# Patient Record
Sex: MALE | Race: BLACK OR AFRICAN AMERICAN | NOT HISPANIC OR LATINO | Employment: OTHER | URBAN - METROPOLITAN AREA
[De-identification: names, ages, dates, MRNs, and addresses within clinical notes are randomized per-mention and may not be internally consistent; named-entity substitution may affect disease eponyms.]

---

## 2018-05-25 ENCOUNTER — TELEPHONE (OUTPATIENT)
Dept: CARDIOLOGY | Facility: MEDICAL CENTER | Age: 63
End: 2018-05-25

## 2018-05-25 DIAGNOSIS — I35.9 AORTIC VALVE DISORDER: ICD-10-CM

## 2018-05-25 NOTE — TELEPHONE ENCOUNTER
"2:10 pm --> Called number on file and a woman had answered the phone.  I introduced myself and where I was calling from and asked if Brannon was available.  She stated that Brannon no longer lives there and [facility] and that if I do get a hold of him, to tell him hi for her since she hasn't seen or heard from patient in a while.  I agreed and ended phone call.  2:15 pm --> Called [facility] and I was told that [facility] and hasn't been for a while.  After I ended [facility] call with the [facility], I called the number on file back and spoke with the lady that is presumably residing there (not his wife).  She stated that patient hasn't been taking his BP meds consistently and there is \"possible dementia/alzhemier's element going on.\"  She stated, \"One of his sons is supposed to be watching out for him, but I don't know which one.\"  She stated, \"He has been JENNIFER for a few days now.\"  She also stated to me that, \"if I mange to get his cell phone to let patient know that people are trying to get a hold of him and to call them.\"  I stated I will do my best and that I will call back in a couple days.  Female residing in residence was thankful for the phone call and call ended.      "

## 2018-05-25 NOTE — PROGRESS NOTES
Message     The referral to Cardiothoracic Surgery will be sent to [facility]. The contact number is [phone].     Noted.

## 2018-05-31 ENCOUNTER — TELEPHONE (OUTPATIENT)
Dept: CARDIOLOGY | Facility: MEDICAL CENTER | Age: 63
End: 2018-05-31

## 2018-05-31 NOTE — TELEPHONE ENCOUNTER
"Received call from . Pt was trying to get his appointments sorted out. He was very frustrated during our conversation repeating several times that he had been on the phone with [facility] for 3 hours and been transferred 6 different times and felt that he was getting \"the run around\". Apologized and validated his frustrations. During call pt was scheduled with Dr. Lambert 6/21 at 3pm for valve surveillance. Pt confirmed appt and wrote down date time and location. Repeated address and instructions on how to get to office several times. At end of call pt appreciated getting something accomplished and stated he had no further questions.   "

## 2018-05-31 NOTE — TELEPHONE ENCOUNTER
----- Message from Florinda Kumar sent at 5/31/2018 10:43 AM PDT -----  Regarding: RE PATIENTS REFERRAL  I spoke to [facility] and they provided me with an additional tel, I added it to the cell portion of his chart. If you wanted to try to reach patient by that tel to get him scheduled. Thanks for your help. :)

## 2018-05-31 NOTE — TELEPHONE ENCOUNTER
L/m on the updated phone number in patient's chart to see about getting scheduled with Dr. Lambert.

## 2018-06-20 DIAGNOSIS — I35.0 NONRHEUMATIC AORTIC VALVE STENOSIS: ICD-10-CM

## 2018-06-20 DIAGNOSIS — R42 LIGHTHEADEDNESS: ICD-10-CM

## 2018-06-20 DIAGNOSIS — R06.02 SHORTNESS OF BREATH: ICD-10-CM

## 2018-06-25 ENCOUNTER — TELEPHONE (OUTPATIENT)
Dept: CARDIOLOGY | Facility: MEDICAL CENTER | Age: 63
End: 2018-06-25

## 2018-06-25 NOTE — TELEPHONE ENCOUNTER
----- Message from VERITO Bishop sent at 2018  3:58 PM PDT -----  Regarding: VALVE NEW  VALVE NEW    Name: Brannon Silver  : [age] y/o M  MR: [id number]  Referring MD: [facility]  Inpatient/Outpatient Referral: outpatient  Referral to Valve Program done: yes  Referral to [facility] done: yes  Echo Done: yes  Echo Reading: EF 25%, Vmax 3.73 m/s, mean 58 mmHg (on angiogram)  Symptoms of AS: DOWD, fatigue  [facility], [facility]  Angiogram: done   Need to schedule angiogram: no  Pertinent Hx: CAD (RCA 90% non-dominant-Ichino states to treat medically), HTN, HLD, non-compliance, mild cognitive impairment with craniotomy in , prior smoker, still smokes marijuana  Allergy to metal or contrast: no  Cr level (within 30 days): no  Oral/IV hydration needed prior to CTA/cath: unknown-need repeat CMP  Social Concerns: mild cognitive impairment-will need good family/friend support to justify good candidate for procedure  Tentative Plan: TAVR v. SAVR  STS: 1.4%, CCI of 2, 90% 10 year survival rate    PATIENT CLASS: TAVR v SAVR  [facility] CONSULT WARRANTED AT THIS TIME: yes, please schedule patient on SLOT A on 7/3 and

## 2018-06-25 NOTE — TELEPHONE ENCOUNTER
Unable to contact patient to schedule for 7/5.  Pt is currently scheduled for 7/5.  I also tried contacting his mother, who is listed on file, and I couldn't get through that number.

## 2018-06-25 NOTE — TELEPHONE ENCOUNTER
Thank you. Please let us know if not able to get a hold of him the week before and we will cancel his apt's. SC

## 2018-06-26 ENCOUNTER — TELEPHONE (OUTPATIENT)
Dept: CARDIOLOGY | Facility: MEDICAL CENTER | Age: 63
End: 2018-06-26

## 2018-06-28 ENCOUNTER — TELEPHONE (OUTPATIENT)
Dept: CARDIOLOGY | Facility: MEDICAL CENTER | Age: 63
End: 2018-06-28

## 2018-06-29 ENCOUNTER — HOSPITAL ENCOUNTER (OUTPATIENT)
Facility: MEDICAL CENTER | Age: 63
End: 2018-06-29
Attending: INTERNAL MEDICINE | Admitting: INTERNAL MEDICINE
Payer: COMMERCIAL

## 2018-07-05 ENCOUNTER — APPOINTMENT (OUTPATIENT)
Dept: RADIOLOGY | Facility: MEDICAL CENTER | Age: 63
End: 2018-07-05
Attending: INTERNAL MEDICINE
Payer: COMMERCIAL

## 2018-07-05 ENCOUNTER — APPOINTMENT (OUTPATIENT)
Dept: CARDIOLOGY | Facility: MEDICAL CENTER | Age: 63
End: 2018-07-05
Attending: INTERNAL MEDICINE
Payer: COMMERCIAL

## 2018-07-05 ENCOUNTER — HOSPITAL ENCOUNTER (OUTPATIENT)
Dept: RADIOLOGY | Facility: MEDICAL CENTER | Age: 63
End: 2018-07-05
Attending: INTERNAL MEDICINE | Admitting: INTERNAL MEDICINE
Payer: COMMERCIAL

## 2018-07-05 DIAGNOSIS — R42 LIGHTHEADEDNESS: ICD-10-CM

## 2018-07-05 DIAGNOSIS — R06.02 SHORTNESS OF BREATH: ICD-10-CM

## 2018-07-05 DIAGNOSIS — I35.0 NONRHEUMATIC AORTIC VALVE STENOSIS: ICD-10-CM

## 2018-07-05 LAB
ABO GROUP BLD: NORMAL
ALBUMIN SERPL BCP-MCNC: 4.2 G/DL (ref 3.2–4.9)
ALBUMIN/GLOB SERPL: 1.3 G/DL
ALP SERPL-CCNC: 108 U/L (ref 30–99)
ALT SERPL-CCNC: 5 U/L (ref 2–50)
ANION GAP SERPL CALC-SCNC: 10 MMOL/L (ref 0–11.9)
APPEARANCE UR: CLEAR
AST SERPL-CCNC: 12 U/L (ref 12–45)
BACTERIA #/AREA URNS HPF: NEGATIVE /HPF
BILIRUB SERPL-MCNC: 0.6 MG/DL (ref 0.1–1.5)
BILIRUB UR QL STRIP.AUTO: NEGATIVE
BLD GP AB SCN SERPL QL: NORMAL
BNP SERPL-MCNC: 423 PG/ML (ref 0–100)
BUN SERPL-MCNC: 28 MG/DL (ref 8–22)
CALCIUM SERPL-MCNC: 9.8 MG/DL (ref 8.5–10.5)
CHLORIDE SERPL-SCNC: 111 MMOL/L (ref 96–112)
CO2 SERPL-SCNC: 16 MMOL/L (ref 20–33)
COLOR UR: YELLOW
CREAT SERPL-MCNC: 1.59 MG/DL (ref 0.5–1.4)
EPI CELLS #/AREA URNS HPF: NEGATIVE /HPF
ERYTHROCYTE [DISTWIDTH] IN BLOOD BY AUTOMATED COUNT: 45.7 FL (ref 35.9–50)
GLOBULIN SER CALC-MCNC: 3.2 G/DL (ref 1.9–3.5)
GLUCOSE SERPL-MCNC: 187 MG/DL (ref 65–99)
GLUCOSE UR STRIP.AUTO-MCNC: NEGATIVE MG/DL
HCT VFR BLD AUTO: 44.8 % (ref 42–52)
HGB BLD-MCNC: 15.1 G/DL (ref 14–18)
HYALINE CASTS #/AREA URNS LPF: ABNORMAL /LPF
INR PPP: 0.99 (ref 0.87–1.13)
KETONES UR STRIP.AUTO-MCNC: NEGATIVE MG/DL
LEUKOCYTE ESTERASE UR QL STRIP.AUTO: NEGATIVE
MCH RBC QN AUTO: 26.7 PG (ref 27–33)
MCHC RBC AUTO-ENTMCNC: 33.7 G/DL (ref 33.7–35.3)
MCV RBC AUTO: 79.2 FL (ref 81.4–97.8)
MICRO URNS: ABNORMAL
MORPHOLOGY BLD-IMP: NORMAL
NITRITE UR QL STRIP.AUTO: NEGATIVE
PH UR STRIP.AUTO: 5 [PH]
PLATELET # BLD AUTO: 137 K/UL (ref 164–446)
PLATELET BLD QL SMEAR: NORMAL
PMV BLD AUTO: 12.2 FL (ref 9–12.9)
POTASSIUM SERPL-SCNC: 3.7 MMOL/L (ref 3.6–5.5)
PROT SERPL-MCNC: 7.4 G/DL (ref 6–8.2)
PROT UR QL STRIP: 100 MG/DL
PROTHROMBIN TIME: 12.8 SEC (ref 12–14.6)
RBC # BLD AUTO: 5.66 M/UL (ref 4.7–6.1)
RBC # URNS HPF: ABNORMAL /HPF
RBC BLD AUTO: NORMAL
RBC UR QL AUTO: NEGATIVE
RH BLD: NORMAL
SODIUM SERPL-SCNC: 137 MMOL/L (ref 135–145)
SP GR UR STRIP.AUTO: 1.02
UROBILINOGEN UR STRIP.AUTO-MCNC: 0.2 MG/DL
WBC # BLD AUTO: 5.5 K/UL (ref 4.8–10.8)
WBC #/AREA URNS HPF: ABNORMAL /HPF

## 2018-07-05 PROCEDURE — 71275 CT ANGIOGRAPHY CHEST: CPT

## 2018-07-05 PROCEDURE — 71048 X-RAY EXAM CHEST 4+ VIEWS: CPT

## 2018-07-05 PROCEDURE — 36415 COLL VENOUS BLD VENIPUNCTURE: CPT

## 2018-07-05 PROCEDURE — 80053 COMPREHEN METABOLIC PANEL: CPT

## 2018-07-05 PROCEDURE — 86901 BLOOD TYPING SEROLOGIC RH(D): CPT

## 2018-07-05 PROCEDURE — 83880 ASSAY OF NATRIURETIC PEPTIDE: CPT

## 2018-07-05 PROCEDURE — 85610 PROTHROMBIN TIME: CPT

## 2018-07-05 PROCEDURE — 94010 BREATHING CAPACITY TEST: CPT

## 2018-07-05 PROCEDURE — 85027 COMPLETE CBC AUTOMATED: CPT

## 2018-07-05 PROCEDURE — 86850 RBC ANTIBODY SCREEN: CPT

## 2018-07-05 PROCEDURE — 700117 HCHG RX CONTRAST REV CODE 255: Performed by: INTERNAL MEDICINE

## 2018-07-05 PROCEDURE — 74174 CTA ABD&PLVS W/CONTRAST: CPT

## 2018-07-05 PROCEDURE — 81001 URINALYSIS AUTO W/SCOPE: CPT

## 2018-07-05 PROCEDURE — 93880 EXTRACRANIAL BILAT STUDY: CPT

## 2018-07-05 PROCEDURE — 86900 BLOOD TYPING SEROLOGIC ABO: CPT

## 2018-07-05 RX ADMIN — IOHEXOL 100 ML: 350 INJECTION, SOLUTION INTRAVENOUS at 13:45

## 2018-07-05 NOTE — RESPIRATORY CARE
"Pulmonary Function Test Results (PFT)    Spirometry Actual Predicted   FVC (L) PEF: 2.73L/s (07/05/18 0943) Predicted PEF: 9.22L/s (07/05/18 0943)   FEV1 ((L) FEV1: 1.99l (07/05/18 0943) Predicted FEV1: 3.64L (07/05/18 0943)   FEV1/FVC (%) VC: 3.31L (07/05/18 0943) Predicted VC: 4.84L (07/05/18 0943)   FEF 25-75% (L/sec)         Test performed in accordance with ATS/ERS standards. Predictive values from NHANES III and graded \"A\". Patient performed test with poor effort and at least 3 reproducible tests. Test printed and copy left in chart.    "

## 2018-07-09 ENCOUNTER — TELEPHONE (OUTPATIENT)
Dept: CARDIOLOGY | Facility: MEDICAL CENTER | Age: 63
End: 2018-07-09

## 2018-07-09 NOTE — TELEPHONE ENCOUNTER
"Spoke with patient who is frustrated that he missed his consultation with Dr. Lambert 7/5/18. He states that he was on campus all day doing test and does not understand why Dr. Lambert left at \"11:35am\" when the patient was here and able to see Dr. Lambert. Explained that patient's original appointment time was 0740, and that time was rescheduled to 1020 per patient request. Reminded patient that he did not show for his 0740 or his 1020 appointment as scheduled 7/5/18. Explained that our next available date for rescheduling such appointment is 7/26/18 at 1040 (arriving at 1020) [address] (4th floor). Repeated this detailed information multiple times, also described that our office is on the corner of [address] and [address] directly across the street from [employer]. Encouraged patient to write down such information. After repeating back to me many times the time/date/location of such appointment, the patient agrees to attend scheduled TAVR consult and states no further questions at this time.   "

## 2018-07-11 ENCOUNTER — TELEPHONE (OUTPATIENT)
Dept: CARDIOLOGY | Facility: MEDICAL CENTER | Age: 63
End: 2018-07-11

## 2018-07-11 NOTE — TELEPHONE ENCOUNTER
----- Message from VERITO Bishop sent at 7/11/2018  3:11 PM PDT -----  No need to call patient. He is a valve patient. SC

## 2018-07-12 ENCOUNTER — TELEPHONE (OUTPATIENT)
Dept: CARDIOLOGY | Facility: MEDICAL CENTER | Age: 63
End: 2018-07-12

## 2018-07-12 NOTE — TELEPHONE ENCOUNTER
CT-CTA CHEST WITH & W/O-POST PROCESS   Order: [id number]   Status:  Final result   Visible to patient:  No (Not Released) Dx:  Shortness of breath; Lightheadedness;...   Notes recorded by VERITO Bishop on 7/9/2018 at 11:02 AM PDT  Nadeem CTA.    Annulus 642.    L iliac smaller, R larger but should be adequate.    Coronary heights good.    AAA noted at 6.2 X 6.1 cm-discussed with JI, can't fix before, will be done post-tavr    Forward to PCP-small kidneys, small umbilical hernia, bilateral renal cysts, pulmonary nodule and LLL scarring.    SC     [facility] and [facility]. Results faxed to the [facility] at [phone].     Pt is scheduled to see Dr. Lambert 7/26 and can discuss results with the doctor then.

## 2018-07-26 ENCOUNTER — OFFICE VISIT (OUTPATIENT)
Dept: CARDIOLOGY | Facility: MEDICAL CENTER | Age: 63
End: 2018-07-26
Payer: COMMERCIAL

## 2018-07-26 ENCOUNTER — TELEPHONE (OUTPATIENT)
Dept: CARDIOLOGY | Facility: MEDICAL CENTER | Age: 63
End: 2018-07-26

## 2018-07-26 VITALS
WEIGHT: 250 LBS | OXYGEN SATURATION: 94 % | HEART RATE: 65 BPM | DIASTOLIC BLOOD PRESSURE: 80 MMHG | BODY MASS INDEX: 35 KG/M2 | SYSTOLIC BLOOD PRESSURE: 120 MMHG | HEIGHT: 71 IN

## 2018-07-26 DIAGNOSIS — I42.8 OTHER CARDIOMYOPATHY (HCC): ICD-10-CM

## 2018-07-26 DIAGNOSIS — I71.40 ABDOMINAL AORTIC ANEURYSM (AAA) WITHOUT RUPTURE (HCC): ICD-10-CM

## 2018-07-26 DIAGNOSIS — Z95.5 STENTED CORONARY ARTERY: ICD-10-CM

## 2018-07-26 DIAGNOSIS — I35.0 SEVERE AORTIC STENOSIS: ICD-10-CM

## 2018-07-26 DIAGNOSIS — I63.89 CEREBROVASCULAR ACCIDENT (CVA) DUE TO OTHER MECHANISM (HCC): ICD-10-CM

## 2018-07-26 DIAGNOSIS — I25.10 CORONARY ARTERY DISEASE INVOLVING NATIVE CORONARY ARTERY OF NATIVE HEART WITHOUT ANGINA PECTORIS: ICD-10-CM

## 2018-07-26 PROBLEM — I63.9 CVA (CEREBRAL VASCULAR ACCIDENT) (HCC): Status: ACTIVE | Noted: 2018-07-26

## 2018-07-26 PROBLEM — I42.9 CARDIOMYOPATHY (HCC): Status: ACTIVE | Noted: 2018-07-26

## 2018-07-26 LAB — EKG IMPRESSION: NORMAL

## 2018-07-26 PROCEDURE — 99205 OFFICE O/P NEW HI 60 MIN: CPT | Performed by: INTERNAL MEDICINE

## 2018-07-26 PROCEDURE — 93000 ELECTROCARDIOGRAM COMPLETE: CPT | Performed by: INTERNAL MEDICINE

## 2018-07-26 ASSESSMENT — ENCOUNTER SYMPTOMS
PND: 0
BLURRED VISION: 0
FEVER: 0
ORTHOPNEA: 0
DIZZINESS: 0
ABDOMINAL PAIN: 0
LOSS OF CONSCIOUSNESS: 0
CHILLS: 0
INSOMNIA: 0
SHORTNESS OF BREATH: 0
MYALGIAS: 0
WEAKNESS: 1
PALPITATIONS: 0

## 2018-07-26 NOTE — PROGRESS NOTES
No chief complaint on file.      Subjective:   Brannon Silver is a 63 y.o. male who presents today for interventional consult/TAVR evaluation requested by [facility] and [facility] for severe symptomatic aortic stenosis.    Thank you for allowing me to evaluate Mr. Silver, who as you know is a 63 year old male with complex medical history including severe aortic stenosis, cardiomyopathy, coronary artery disease status post stent placement, abdominal aortic aneurysm, possible cerebrovascular accident, history of brain tumor with craniotomy. He was well until two months ago ago when he began to experience moderate fatigue. He has difficulties performing his ADLs dud to this symptom. He denies shortness of breath, dyspnea on exertion, chest pain, dizziness or syncope.    Past Medical History:   Diagnosis Date   • History of brain cancer    • Hypertension    • Valvular heart disease      Past Surgical History:   Procedure Laterality Date   • CRANIOTOMY       Family History   Problem Relation Age of Onset   • Heart Disease Neg Hx      Social History     Social History   • Marital status: Single     Spouse name: N/A   • Number of children: N/A   • Years of education: N/A     Occupational History   • Not on file.     Social History Main Topics   • Smoking status: Former Smoker     Quit date: 3/26/2008   • Smokeless tobacco: Never Used   • Alcohol use Not on file   • Drug use: Unknown   • Sexual activity: Not on file     Other Topics Concern   • Not on file     Social History Narrative   • No narrative on file     Allergies   Allergen Reactions   • Pcn [Penicillins]      Medications reviewed.    Outpatient Encounter Prescriptions as of 7/26/2018   Medication Sig Dispense Refill   • aspirin  MG TBEC Take 1 Tab by mouth every day. 30 Tab 0   • atorvastatin (LIPITOR) 40 MG TABS Take 1 Tab by mouth every bedtime. 30 Each 0   • clopidogrel (PLAVIX) 75 MG TABS Take 2 Tabs by mouth every day. 30 Each 0   • lisinopril  "(PRINIVIL) 20 MG TABS Take 1 Tab by mouth every day. 30 Each 0   • metoprolol (LOPRESSOR) 100 MG TABS Take 1 Tab by mouth 2 Times a Day. 30 Each 0     No facility-administered encounter medications on file as of 7/26/2018.      Review of Systems   Constitutional: Positive for malaise/fatigue. Negative for chills and fever.   HENT: Negative for congestion.    Eyes: Negative for blurred vision.   Respiratory: Negative for shortness of breath.    Cardiovascular: Negative for chest pain, palpitations, orthopnea, leg swelling and PND.   Gastrointestinal: Negative for abdominal pain.   Genitourinary: Negative for dysuria.   Musculoskeletal: Positive for joint pain. Negative for myalgias.   Skin: Negative for rash.   Neurological: Positive for weakness. Negative for dizziness and loss of consciousness.   Psychiatric/Behavioral: The patient does not have insomnia.         Objective:   /80   Pulse 65   Ht 1.803 m (5' 11\")   Wt 113.4 kg (250 lb)   SpO2 94%   BMI 34.87 kg/m²     Physical Exam   Constitutional: He is oriented to person, place, and time. He appears well-developed and well-nourished.   HENT:   Head: Normocephalic and atraumatic.   Eyes: Pupils are equal, round, and reactive to light. Conjunctivae are normal.   Neck: Normal range of motion. Neck supple.   Cardiovascular: Normal rate and regular rhythm.    Murmur heard.  Pulmonary/Chest: Effort normal and breath sounds normal.   Abdominal: Soft. Bowel sounds are normal.   Musculoskeletal: Normal range of motion.   Neurological: He is alert and oriented to person, place, and time.   Skin: Skin is warm and dry.   Psychiatric: He has a normal mood and affect.     CARDIAC STUDIES/PROCEDURES:    CARDIAC CATHETERIZATION CONCLUSIONS by Dr. Fuentes (07/16/11)  1.  Acute inferior wall myocardial infarction secondary to 99% stenosis in the mid portion of the right coronary artery.  2.  Status post successful stenting of the right coronary artery with 2.5 x 18 " mm bare metal Vision stent.  3.  No significant disease in the left main and left anterior descending artery with about 50% stenosis in distal left circumflex artery.  4.  Moderate aortic stenosis by echocardiography.  5.  Renal insufficiency.  6.  Obesity.  7.  History of benign tumor of the frontal lobe.    CAROTID ULTRASOUND (07/05/18)  No significant stenosis of the right or left carotid, subclavian, or vertebral arteries.    CT OF CHEST AND ABDOMEN (07/05/18)  1.  Mean aortic annular diameter of 642 sq mm  2.  Minimal diameter right external iliac artery 8.7 mm in minimal diameter left external iliac artery 5.9 mm  3.  Mild bilateral common and external iliac artery tortuosit  4.  Coronary arteries originate greater than 1 cm from the aortic annulus  5.  Importantly, 6.2 x 6.1 cm infrarenal abdominal aortic aneurysm  6.  Left ventricular hypertrophy  7.  Bilateral small kidney with cortical thinning compatible sequela of a chronic process  8.  Small umbilical hernia containing omental fat but no bowel  9.  Bilateral simple renal cyst  10.  7 mm subsolid right upper lobe pulmonary nodule  11.  Left lower lobe scarring    ECHOCARDIOGRAM CONCLUSIONS [facility] (07/2017)  Echocardiogram showing reduced left ventricular systolic function, ejection fraction of 25-30 %, severe aortic stenosis with calculated PETER of 0.66 cm2, Peak gradient of 68 mmHg, mean gradient of 52 mmHg.    EKG was ordered for severe aortic stenosis, performed on (07/26/18) and reviewed: EKG shows sinus rhythm with non-specific intra-ventricular conduction delay.    Laboratory results of 07/05/18 were reviewed. Bun of 28 mg/dl, creatinine levels of 1.56 mg/dl noted.    Assessment:     1. Severe aortic stenosis     2. Other cardiomyopathy (HCC)     3. Coronary artery disease involving native coronary artery of native heart without angina pectoris     4. Stented coronary artery     5. Abdominal aortic aneurysm (AAA) without rupture (HCC)     6.  Cerebrovascular accident (CVA) due to other mechanism (HCC)       Medical Decision Making:  Today's Assessment / Status / Plan:     1. Aortic stenosis: He is symptomatic with his severe aortic stenosis, NYHA class III. He has been determined to be intermediate risk per Dr. Orona with STS score of 3-5%. We will schedule him for cardiac catheterization followed by TAVR. He understands the risks and benefits and agrees with plan.  2. Cardiomyopathy  3. Coronary artery disease with stent placement: Plan as above.  4. Abdominal aortic aneurysm: He will need repair of his aneurysm following TAVR.  5. Possible history of stroke (2009): He remains clinically stable without recurrence of stroke symptoms.  6. Health maintenance: History of brain tumor with craniotomy [facility] (1975)    The risks, benefits, and alternatives to coronary angiography with IV sedation were discussed in great detail. Specific risks mentioned include bleeding, infection, kidney damage, allergic reaction, cardiac perforation with possible tamponade requiring jamie-cardiocentesis or possible open heart surgery. In addition, we discussed that 10% of patients will experience small to moderate bruising at the side of the arterial puncture. Lastly the risks of heart attack, stroke, and death were discussed; the risks of major complications such as heart attack or stroke caused by the angiogram is less than 1%; the risk of death is approximately 1 in 1000. The patient verbalized understanding of these potential complications and wishes to proceed with this procedure.    The risks, benefits, and alternatives to TAVR, general anesthesia and transesophageal echocardiogram were discussed in great detail. Specific risks mentioned include bleeding, infection, kidney damage, allergic reaction, cardiac perforation with possible tamponade requiring jamie-cardiocentesis or possible open heart surgery if the patient is a candidate. Lastly the risks of heart attack,  stroke, and death were discussed; the risks of major complications includingall cause mortality of 2.2%, disabling stroke of 1.1%, the risk on new pacemaker of 12% and the risk of vascular complications of 4.1%. The patient verbalized understanding of these potential complications and wishes to proceed with this procedure. (Source 3 Registry).  The procedure will be performed completely in collaboration with cardiac surgery.    We will follow up in two months.    Thank you for this consult.

## 2018-07-26 NOTE — LETTER
[facility]   [address]  [place] [address]  Phone: [phone]  Fax: [phone]              Brannon Silver  1955    Encounter Date: 7/26/2018    Boston Lambert M.D.          PROGRESS NOTE:  No chief complaint on file.      Subjective:   Brannon Silver is a 63 y.o. male who presents today for interventional consult/TAVR evaluation requested by [facility] for severe symptomatic aortic stenosis.    Thank you for allowing me to evaluate Mr. Silver, who as you know is a 63 year old male with complex medical history including severe aortic stenosis, cardiomyopathy, coronary artery disease status post stent placement, abdominal aortic aneurysm, possible cerebrovascular accident, history of brain tumor with craniotomy.    Past Medical History:   Diagnosis Date   • History of brain cancer    • Hypertension    • Valvular heart disease      Past Surgical History:   Procedure Laterality Date   • CRANIOTOMY       Family History   Problem Relation Age of Onset   • Heart Disease Neg Hx      Social History     Social History   • Marital status: Single     Spouse name: N/A   • Number of children: N/A   • Years of education: N/A     Occupational History   • Not on file.     Social History Main Topics   • Smoking status: Former Smoker     Quit date: 3/26/2008   • Smokeless tobacco: Never Used   • Alcohol use Not on file   • Drug use: Unknown   • Sexual activity: Not on file     Other Topics Concern   • Not on file     Social History Narrative   • No narrative on file     Allergies   Allergen Reactions   • Pcn [Penicillins]      Medications reviewed.    Outpatient Encounter Prescriptions as of 7/26/2018   Medication Sig Dispense Refill   • aspirin  MG TBEC Take 1 Tab by mouth every day. 30 Tab 0   • atorvastatin (LIPITOR) 40 MG TABS Take 1 Tab by mouth every bedtime. 30 Each 0   • clopidogrel (PLAVIX) 75 MG TABS Take 2 Tabs by mouth every day. 30 Each 0   •  "lisinopril (PRINIVIL) 20 MG TABS Take 1 Tab by mouth every day. 30 Each 0   • metoprolol (LOPRESSOR) 100 MG TABS Take 1 Tab by mouth 2 Times a Day. 30 Each 0     No facility-administered encounter medications on file as of 7/26/2018.      Review of Systems   Constitutional: Negative for chills and fever.   HENT: Negative for congestion.    Eyes: Negative for blurred vision.   Respiratory: Negative for shortness of breath.    Cardiovascular: Negative for chest pain, palpitations, orthopnea, leg swelling and PND.   Gastrointestinal: Negative for abdominal pain.   Genitourinary: Negative for dysuria.   Musculoskeletal: Negative for joint pain and myalgias.   Skin: Negative for rash.   Neurological: Negative for dizziness and loss of consciousness.   Psychiatric/Behavioral: The patient does not have insomnia.         Objective:   /80   Pulse 65   Ht 1.803 m (5' 11\")   Wt 113.4 kg (250 lb)   SpO2 94%   BMI 34.87 kg/m²      Physical Exam   Constitutional: He is oriented to person, place, and time. He appears well-developed and well-nourished.   HENT:   Head: Normocephalic and atraumatic.   Eyes: Pupils are equal, round, and reactive to light. Conjunctivae are normal.   Neck: Normal range of motion. Neck supple.   Cardiovascular: Normal rate and regular rhythm.    Murmur heard.  Pulmonary/Chest: Effort normal and breath sounds normal.   Abdominal: Soft. Bowel sounds are normal.   Musculoskeletal: Normal range of motion.   Neurological: He is alert and oriented to person, place, and time.   Skin: Skin is warm and dry.   Psychiatric: He has a normal mood and affect.     CARDIAC STUDIES/PROCEDURES:    CARDIAC CATHETERIZATION CONCLUSIONS by Dr. Fuentes (07/16/11)  1.  Acute inferior wall myocardial infarction secondary to 99% stenosis  in the mid portion of the right coronary artery.  2.  Status post successful stenting of the right coronary artery with  2.5 x 18 mm bare metal Vision stent.  3.  No significant " disease in the left main and left anterior descending  artery with about 50% stenosis in distal left circumflex artery.  4.  Moderate aortic stenosis by echocardiography.  5.  Renal insufficiency.  6.  Obesity.  7.  History of benign tumor of the frontal lobe.    CAROTID ULTRASOUND (07/05/18)  No significant stenosis of the right or left carotid, subclavian, or vertebral arteries.    CT OF CHEST AND ABDOMEN (07/05/18)  1.  Mean aortic annular diameter of 642 sq mm  2.  Minimal diameter right external iliac artery 8.7 mm in minimal diameter left external iliac artery 5.9 mm  3.  Mild bilateral common and external iliac artery tortuosit  4.  Coronary arteries originate greater than 1 cm from the aortic annulus  5.  Importantly, 6.2 x 6.1 cm infrarenal abdominal aortic aneurysm  6.  Left ventricular hypertrophy  7.  Bilateral small kidney with cortical thinning compatible sequela of a chronic process  8.  Small umbilical hernia containing omental fat but no bowel  9.  Bilateral simple renal cyst  10.  7 mm subsolid right upper lobe pulmonary nodule  11.  Left lower lobe scarring    ECHOCARDIOGRAM CONCLUSIONS [facility] (07/2017)  Echocardiogram showing reduced left ventricular systolic function, ejection fraction of 25-30 %, severe aortic stenosis with calculated PETER of 0.66 cm2, Peak gradient of 68 mmHg, mean gradient of 52 mmHg.    EKG was ordered for severe aortic stenosis, performed on (07/26/18) and reviewed: EKG shows sinus rhythm.    Laboratory results of 07/05/18 were reviewed. Bun of 28 mg/dl, creatinine levels of 1.56 mg/dl noted.    Assessment:     1. Severe aortic stenosis     2. Other cardiomyopathy (HCC)     3. Coronary artery disease involving native coronary artery of native heart without angina pectoris     4. Stented coronary artery     5. Abdominal aortic aneurysm (AAA) without rupture (HCC)     6. Cerebrovascular accident (CVA) due to other mechanism (HCC)         Medical Decision Making:  Today's  Assessment / Status / Plan:     1. Aortic stenosis: He is symptomatic with his severe aortic stenosis, NYHA class III. He has been determined to be intermediate risk per Dr. Orona with STS score of 3-5%. We will schedule him for cardiac catheterization followed by TAVR. He understands the risks and benefits and agrees with plan.  2. Cardiomyopathy  3. Coronary artery disease with stent placement: Plan as above.  4. Abdominal aortic aneurysm: He will need repair of his aneurysm following TAVR.  5. History of stroke: He remains clinically stable without recurrence of stroke symptoms.  6. Health maintenance: History of brain tumor with craniotomy    The risks, benefits, and alternatives to coronary angiography with IV sedation were discussed in great detail. Specific risks mentioned include bleeding, infection, kidney damage, allergic reaction, cardiac perforation with possible tamponade requiring jamie-cardiocentesis or possible open heart surgery. In addition, we discussed that 10% of patients will experience small to moderate bruising at the side of the arterial puncture. Lastly the risks of heart attack, stroke, and death were discussed; the risks of major complications such as heart attack or stroke caused by the angiogram is less than 1%; the risk of death is approximately 1 in 1000. The patient verbalized understanding of these potential complications and wishes to proceed with this procedure.    The risks, benefits, and alternatives to TAVR, general anesthesia and transesophageal echocardiogram were discussed in great detail. Specific risks mentioned include bleeding, infection, kidney damage, allergic reaction, cardiac perforation with possible tamponade requiring jamie-cardiocentesis or possible open heart surgery if the patient is a candidate. Lastly the risks of heart attack, stroke, and death were discussed; the risks of major complications includingall cause mortality of 2.2%, disabling stroke of 1.1%,  the risk on new pacemaker of 12% and the risk of vascular complications of 4.1%. The patient verbalized understanding of these potential complications and wishes to proceed with this procedure. (Source 3 Registry).  The procedure will be performed completely in collaboration with cardiac surgery.    We will follow up in two months.    Thank you for this consult.           No Recipients

## 2018-07-26 NOTE — TELEPHONE ENCOUNTER
TAVR functional assessment     Mental Status Assessment:  Appearance: normal  Behavior: unusual, growling/moaning at times  Mood/Affect: blunt  Thought process/content: slow  Cognition: slow  Functional ability: normal  Dental Concerns: full upper and lower dentures  Dental Clearance warranted: no  Further mental assessment needed: patient deemed competent by psych and neuro     Post-op Plan of Care:  Support systems: Mark (neighbor)   Patient understands discharge plan: yes  DME: none  Home health warranted prior to TAVR: no  Social concerns for discharge: yes, transport from hosptal to home is unknown.  PCP alerted of social concerns: n/a  POLST: completed today, scanned into chart  Advance directive: will complete prior to surgery.  Flu/PNA vaccines completed: completed     Goal is to be able to continue living indpendently and caring for himself.      Concerns prior to TAVR: he is concerned about the care of his emotional support cat while inpatient. There is also concern about transportation and his ability to arrive at 0530 for procedure.     Met with patient during New TAVR consult. Asked if patient has any questions regarding the information Dr. Lambert has discussed. Patient denies further questions regarding information delivered at this time.     Explained that patient will require SBE prophylactic antibiotic prior to any procedures or surgery, including dental post TAVR. Patient states understanding and agrees to carry SBE antibiotic wallet card at all times.    Reviewed patient TAVR education packet explaining that information is provided regarding preparation for TAVR the night prior, what to expect during the hospital stay, average LOS, and what to look out for post TAVR discharge. Encouraged pt to take this packet home, read through, and contact me with any questions or concerns they may have. My goal is to assure that before we ever schedule procedure that patient and family have clear understanding  of all entailed and agree with plan.     Completed KCCQ12 and frailty assessment.Provided patient education regarding use of CHG wipes Sunday night prior to procedure, stressing the importance not rinsing or drying, allowing skin to air dry naturally. Explained that patient should not eat or drink anything past midnight Sunday. Encouraged patient to wear something clean and comfortable, easy to get on/off to check in Monday morning at 0530. Patient may have friends and family in the pre-operational area until patient is taken to the operating room, at which time family and friends will be asked to wait on floor 1 [facility] surgical waiting area. On completion of TAVR heart team will update family. Once update is given there is some time before family/friends may visit patient in their assigned room. Length of stay on average is one night, however patient may stay a couple days depending on specific needs at that time. Patient given printed instructions sheet with all above stated instructions.Patient states understanding of all material and education presented today and has no further questions at this time. Encouraged patient again, to contact me with any questions or concerns during this work-up process. Patient states understanding and thankful. Patient was then escorted by myself to clinic appointment check-out desk.

## 2018-07-31 ENCOUNTER — TELEPHONE (OUTPATIENT)
Dept: CARDIOLOGY | Facility: MEDICAL CENTER | Age: 63
End: 2018-07-31

## 2018-07-31 NOTE — TELEPHONE ENCOUNTER
----- Message from Ashleigh Corbin R.N. sent at 7/30/2018  9:42 AM PDT -----  Regarding: TAVR cath JANET Mckeon,    It looks like Dr. Lambert spoke with this patient 7/26 about being scheduled for a PRE TAVR cath. The patient will need scheduled no later than 8/3 if possible. Let me know if you have any questions. Thanks.    Ashleigh

## 2018-07-31 NOTE — TELEPHONE ENCOUNTER
----- Message from Ashleigh Corbin R.N. sent at 7/31/2018  1:45 PM PDT -----  Regarding: pre TAVR cath JANET Mckeon,    This patient called wanting to know when he may be scheduled for cath. I let him know that a  will contact him for such. Just wanted to update you that he is awaiting call :)    Ashleigh

## 2018-08-01 ENCOUNTER — TELEPHONE (OUTPATIENT)
Dept: CARDIOLOGY | Facility: MEDICAL CENTER | Age: 63
End: 2018-08-01

## 2018-08-01 NOTE — TELEPHONE ENCOUNTER
Received call from [facility] MAMIE Vang confirming receipt of message regarding patient's AAA. She has placed referral for patient to follow up with [facility] vascular surgeon. She reports patient has seen [facility] vascular surgeon prior and will be following up for treatment. She will keep us apprised of any future plan of care and very thankful for notifications today.

## 2018-08-01 NOTE — TELEPHONE ENCOUNTER
"----- Message from Ashleigh Corbin R.N. sent at 8/1/2018  1:22 PM PDT -----  Regarding: [facility] records  Hey ladies,    This patient had a cardiac cath at [facility] in [place] \"couple months ago\". May I have your help obtaining records for such?     Thanks,    Ashleigh"

## 2018-08-01 NOTE — TELEPHONE ENCOUNTER
Per Ashleigh and conversation with patient, patients said he had angiogram done in [place] and Ashleigh to get records.

## 2018-08-01 NOTE — TELEPHONE ENCOUNTER
"Spoke with patient informing that our heart team presented his case at valve conference this AM. They will need to cancel his TAVR until further notice due to an incidental finding of AAA on his pre procedure CTA. Patient states that he is aware of AAA because he had an angiogram at [facility] in [place] and they had to \"go through my arm\" instead of using femoral access due to the AAA. Explained that he will need to see [facility] vascular surgeons prior to our team being able to safely reschedule his TAVR. Patient states understanding and agrees to reach out to [facility] to obtain appointment for vascular consult.     Sent message to [facility] MAMIE Vang informing of CTA findings and heart team's recommendations to arrange vascular consult for AAA at [facility]. Encouraged return contact with any questions/ concerns. Will continue reaching out to [facility] to assure vascular referral/ appointment arranged.   "

## 2018-08-01 NOTE — TELEPHONE ENCOUNTER
Records request faxed to [facility] in [place]  Phone:  [phone]  Fax:  [phone]

## 2018-08-02 ENCOUNTER — TELEPHONE (OUTPATIENT)
Dept: CARDIOLOGY | Facility: MEDICAL CENTER | Age: 63
End: 2018-08-02

## 2018-08-10 ENCOUNTER — TELEPHONE (OUTPATIENT)
Dept: CARDIOLOGY | Facility: MEDICAL CENTER | Age: 63
End: 2018-08-10

## 2018-08-10 NOTE — TELEPHONE ENCOUNTER
"----- Message from Ashleigh Corbin R.N. sent at 8/1/2018  1:22 PM PDT -----  Regarding: [facility] records  Hey ladies,    This patient had a cardiac cath at [facility] in [place] \"couple months ago\". May I have your help obtaining records for such?     Thanks,    Ashleigh"

## 2018-09-12 NOTE — TELEPHONE ENCOUNTER
Valve Conference 8/2/18 Heart Team Plan of Care:    TAVR candidate: Yes   With Possible open heart: Yes   Access: TF  Valve Size: 29  Anesthesia: GA   Unit: Tele   Incidental findings: 6.2 x 6.1 cm infrarenal abdominal aortic aneurysm on CTA 7/7/18.  Clearance needed prior to procedure: Yes, Vascular  Plan of care: Heart team will reach out to [facility] and request URGENT referral to Vascular Surgeon to repair AAA per Dr. German Orona and Dr. Lambert's recommendations. After AAA repair complete, our heart team will require vascular clearance and re-present patient at subsequent valve conference to re discuss treatment options and candidacy.       Spoke with patient and made aware of heart team's recommendations. Patient agrees with the plan and states no further questions at this time.

## 2019-06-24 ENCOUNTER — APPOINTMENT (OUTPATIENT)
Dept: RADIOLOGY | Facility: MEDICAL CENTER | Age: 64
DRG: 065 | End: 2019-06-24
Attending: HOSPITALIST
Payer: COMMERCIAL

## 2019-06-24 ENCOUNTER — APPOINTMENT (OUTPATIENT)
Dept: RADIOLOGY | Facility: MEDICAL CENTER | Age: 64
DRG: 065 | End: 2019-06-24
Attending: EMERGENCY MEDICINE
Payer: COMMERCIAL

## 2019-06-24 ENCOUNTER — HOSPITAL ENCOUNTER (INPATIENT)
Facility: MEDICAL CENTER | Age: 64
LOS: 9 days | DRG: 065 | End: 2019-07-03
Attending: EMERGENCY MEDICINE | Admitting: HOSPITALIST
Payer: COMMERCIAL

## 2019-06-24 DIAGNOSIS — I63.10 CEREBROVASCULAR ACCIDENT (CVA) DUE TO EMBOLISM OF PRECEREBRAL ARTERY (HCC): ICD-10-CM

## 2019-06-24 PROBLEM — I10 ESSENTIAL HYPERTENSION: Status: ACTIVE | Noted: 2019-06-24

## 2019-06-24 PROBLEM — Z87.898 HISTORY OF SEIZURE: Status: ACTIVE | Noted: 2019-06-24

## 2019-06-24 LAB
ABO GROUP BLD: NORMAL
ALBUMIN SERPL BCP-MCNC: 3.7 G/DL (ref 3.2–4.9)
ALBUMIN/GLOB SERPL: 1 G/DL
ALP SERPL-CCNC: 122 U/L (ref 30–99)
ALT SERPL-CCNC: 6 U/L (ref 2–50)
AMPHET UR QL SCN: NEGATIVE
ANION GAP SERPL CALC-SCNC: 9 MMOL/L (ref 0–11.9)
APPEARANCE UR: CLEAR
APTT PPP: 26.9 SEC (ref 24.7–36)
AST SERPL-CCNC: 12 U/L (ref 12–45)
BACTERIA #/AREA URNS HPF: NEGATIVE /HPF
BARBITURATES UR QL SCN: NEGATIVE
BASOPHILS # BLD AUTO: 0.7 % (ref 0–1.8)
BASOPHILS # BLD: 0.04 K/UL (ref 0–0.12)
BENZODIAZ UR QL SCN: NEGATIVE
BILIRUB SERPL-MCNC: 0.3 MG/DL (ref 0.1–1.5)
BILIRUB UR QL STRIP.AUTO: NEGATIVE
BLD GP AB SCN SERPL QL: NORMAL
BUN SERPL-MCNC: 40 MG/DL (ref 8–22)
BZE UR QL SCN: NEGATIVE
CALCIUM SERPL-MCNC: 9.4 MG/DL (ref 8.5–10.5)
CANNABINOIDS UR QL SCN: NEGATIVE
CHLORIDE SERPL-SCNC: 113 MMOL/L (ref 96–112)
CO2 SERPL-SCNC: 18 MMOL/L (ref 20–33)
COLOR UR: YELLOW
CREAT SERPL-MCNC: 2.45 MG/DL (ref 0.5–1.4)
EKG IMPRESSION: NORMAL
EOSINOPHIL # BLD AUTO: 0.19 K/UL (ref 0–0.51)
EOSINOPHIL NFR BLD: 3.2 % (ref 0–6.9)
EPI CELLS #/AREA URNS HPF: NEGATIVE /HPF
ERYTHROCYTE [DISTWIDTH] IN BLOOD BY AUTOMATED COUNT: 45 FL (ref 35.9–50)
EST. AVERAGE GLUCOSE BLD GHB EST-MCNC: 128 MG/DL
ETHANOL BLD-MCNC: 0 G/DL
GLOBULIN SER CALC-MCNC: 3.7 G/DL (ref 1.9–3.5)
GLUCOSE SERPL-MCNC: 140 MG/DL (ref 65–99)
GLUCOSE UR STRIP.AUTO-MCNC: 100 MG/DL
HBA1C MFR BLD: 6.1 % (ref 0–5.6)
HCT VFR BLD AUTO: 34.7 % (ref 42–52)
HGB BLD-MCNC: 11.3 G/DL (ref 14–18)
HYALINE CASTS #/AREA URNS LPF: ABNORMAL /LPF
IMM GRANULOCYTES # BLD AUTO: 0.01 K/UL (ref 0–0.11)
IMM GRANULOCYTES NFR BLD AUTO: 0.2 % (ref 0–0.9)
INR PPP: 1.02 (ref 0.87–1.13)
KETONES UR STRIP.AUTO-MCNC: NEGATIVE MG/DL
LEUKOCYTE ESTERASE UR QL STRIP.AUTO: NEGATIVE
LYMPHOCYTES # BLD AUTO: 1 K/UL (ref 1–4.8)
LYMPHOCYTES NFR BLD: 16.9 % (ref 22–41)
MCH RBC QN AUTO: 27.2 PG (ref 27–33)
MCHC RBC AUTO-ENTMCNC: 32.6 G/DL (ref 33.7–35.3)
MCV RBC AUTO: 83.6 FL (ref 81.4–97.8)
METHADONE UR QL SCN: NEGATIVE
MICRO URNS: ABNORMAL
MONOCYTES # BLD AUTO: 0.53 K/UL (ref 0–0.85)
MONOCYTES NFR BLD AUTO: 9 % (ref 0–13.4)
NEUTROPHILS # BLD AUTO: 4.15 K/UL (ref 1.82–7.42)
NEUTROPHILS NFR BLD: 70 % (ref 44–72)
NITRITE UR QL STRIP.AUTO: NEGATIVE
NRBC # BLD AUTO: 0 K/UL
NRBC BLD-RTO: 0 /100 WBC
OPIATES UR QL SCN: NEGATIVE
OXYCODONE UR QL SCN: NEGATIVE
PCP UR QL SCN: NEGATIVE
PH UR STRIP.AUTO: 5 [PH]
PLATELET # BLD AUTO: 162 K/UL (ref 164–446)
PMV BLD AUTO: 12.1 FL (ref 9–12.9)
POTASSIUM SERPL-SCNC: 4.3 MMOL/L (ref 3.6–5.5)
PROPOXYPH UR QL SCN: NEGATIVE
PROT SERPL-MCNC: 7.4 G/DL (ref 6–8.2)
PROT UR QL STRIP: 100 MG/DL
PROTHROMBIN TIME: 13.7 SEC (ref 12–14.6)
RBC # BLD AUTO: 4.15 M/UL (ref 4.7–6.1)
RBC # URNS HPF: ABNORMAL /HPF
RBC UR QL AUTO: NEGATIVE
RH BLD: NORMAL
SODIUM SERPL-SCNC: 140 MMOL/L (ref 135–145)
SP GR UR STRIP.AUTO: 1.04
TROPONIN I SERPL-MCNC: 0.07 NG/ML (ref 0–0.04)
UROBILINOGEN UR STRIP.AUTO-MCNC: 0.2 MG/DL
WBC # BLD AUTO: 5.9 K/UL (ref 4.8–10.8)
WBC #/AREA URNS HPF: ABNORMAL /HPF

## 2019-06-24 PROCEDURE — 86900 BLOOD TYPING SEROLOGIC ABO: CPT

## 2019-06-24 PROCEDURE — 85730 THROMBOPLASTIN TIME PARTIAL: CPT

## 2019-06-24 PROCEDURE — 95951 EEG: CPT | Mod: 26,52 | Performed by: PSYCHIATRY & NEUROLOGY

## 2019-06-24 PROCEDURE — 84484 ASSAY OF TROPONIN QUANT: CPT

## 2019-06-24 PROCEDURE — 86901 BLOOD TYPING SEROLOGIC RH(D): CPT

## 2019-06-24 PROCEDURE — 770020 HCHG ROOM/CARE - TELE (206)

## 2019-06-24 PROCEDURE — 99285 EMERGENCY DEPT VISIT HI MDM: CPT

## 2019-06-24 PROCEDURE — 85025 COMPLETE CBC W/AUTO DIFF WBC: CPT

## 2019-06-24 PROCEDURE — A9270 NON-COVERED ITEM OR SERVICE: HCPCS | Performed by: HOSPITALIST

## 2019-06-24 PROCEDURE — 99284 EMERGENCY DEPT VISIT MOD MDM: CPT | Performed by: PSYCHIATRY & NEUROLOGY

## 2019-06-24 PROCEDURE — 93005 ELECTROCARDIOGRAM TRACING: CPT | Performed by: EMERGENCY MEDICINE

## 2019-06-24 PROCEDURE — 80053 COMPREHEN METABOLIC PANEL: CPT

## 2019-06-24 PROCEDURE — 36415 COLL VENOUS BLD VENIPUNCTURE: CPT

## 2019-06-24 PROCEDURE — 85610 PROTHROMBIN TIME: CPT

## 2019-06-24 PROCEDURE — 4A00X4Z MEASUREMENT OF CENTRAL NERVOUS ELECTRICAL ACTIVITY, EXTERNAL APPROACH: ICD-10-PCS | Performed by: PSYCHIATRY & NEUROLOGY

## 2019-06-24 PROCEDURE — 83036 HEMOGLOBIN GLYCOSYLATED A1C: CPT

## 2019-06-24 PROCEDURE — 70496 CT ANGIOGRAPHY HEAD: CPT

## 2019-06-24 PROCEDURE — 95951 EEG: CPT | Mod: 52 | Performed by: PSYCHIATRY & NEUROLOGY

## 2019-06-24 PROCEDURE — 71045 X-RAY EXAM CHEST 1 VIEW: CPT

## 2019-06-24 PROCEDURE — 70450 CT HEAD/BRAIN W/O DYE: CPT

## 2019-06-24 PROCEDURE — 80307 DRUG TEST PRSMV CHEM ANLYZR: CPT

## 2019-06-24 PROCEDURE — 99223 1ST HOSP IP/OBS HIGH 75: CPT | Mod: AI | Performed by: HOSPITALIST

## 2019-06-24 PROCEDURE — 70498 CT ANGIOGRAPHY NECK: CPT

## 2019-06-24 PROCEDURE — 70551 MRI BRAIN STEM W/O DYE: CPT

## 2019-06-24 PROCEDURE — 81001 URINALYSIS AUTO W/SCOPE: CPT

## 2019-06-24 PROCEDURE — 0042T CT-CEREBRAL PERFUSION ANALYSIS: CPT

## 2019-06-24 PROCEDURE — 86850 RBC ANTIBODY SCREEN: CPT

## 2019-06-24 PROCEDURE — 700117 HCHG RX CONTRAST REV CODE 255: Performed by: EMERGENCY MEDICINE

## 2019-06-24 PROCEDURE — 700102 HCHG RX REV CODE 250 W/ 637 OVERRIDE(OP): Performed by: HOSPITALIST

## 2019-06-24 RX ORDER — ASPIRIN 325 MG
325 TABLET ORAL DAILY
Status: DISCONTINUED | OUTPATIENT
Start: 2019-06-24 | End: 2019-07-03 | Stop reason: HOSPADM

## 2019-06-24 RX ORDER — FERROUS SULFATE 325(65) MG
325 TABLET ORAL
Status: ON HOLD | COMMUNITY
End: 2023-10-18 | Stop reason: SDUPTHER

## 2019-06-24 RX ORDER — ASPIRIN 300 MG/1
300 SUPPOSITORY RECTAL DAILY
Status: DISCONTINUED | OUTPATIENT
Start: 2019-06-24 | End: 2019-07-03 | Stop reason: HOSPADM

## 2019-06-24 RX ORDER — ATORVASTATIN CALCIUM 80 MG/1
80 TABLET, FILM COATED ORAL EVERY EVENING
Status: DISCONTINUED | OUTPATIENT
Start: 2019-06-24 | End: 2019-07-03 | Stop reason: HOSPADM

## 2019-06-24 RX ORDER — CARVEDILOL 6.25 MG/1
6.25 TABLET ORAL 2 TIMES DAILY WITH MEALS
Status: ON HOLD | COMMUNITY
End: 2019-07-03

## 2019-06-24 RX ORDER — M-VIT,TX,IRON,MINS/CALC/FOLIC 27MG-0.4MG
1 TABLET ORAL DAILY
COMMUNITY
End: 2023-10-11

## 2019-06-24 RX ORDER — FINASTERIDE 5 MG/1
5 TABLET, FILM COATED ORAL EVERY MORNING
Status: ON HOLD | COMMUNITY
End: 2023-10-18 | Stop reason: SDUPTHER

## 2019-06-24 RX ORDER — ASPIRIN 81 MG/1
324 TABLET, CHEWABLE ORAL DAILY
Status: DISCONTINUED | OUTPATIENT
Start: 2019-06-24 | End: 2019-07-03 | Stop reason: HOSPADM

## 2019-06-24 RX ORDER — TAMSULOSIN HYDROCHLORIDE 0.4 MG/1
0.8 CAPSULE ORAL EVERY EVENING
Status: ON HOLD | COMMUNITY
End: 2023-10-18 | Stop reason: SDUPTHER

## 2019-06-24 RX ORDER — AMLODIPINE BESYLATE 10 MG/1
10 TABLET ORAL DAILY
COMMUNITY
End: 2023-10-11

## 2019-06-24 RX ADMIN — ATORVASTATIN CALCIUM 80 MG: 80 TABLET, FILM COATED ORAL at 16:56

## 2019-06-24 RX ADMIN — ASPIRIN 325 MG: 325 TABLET, FILM COATED ORAL at 13:59

## 2019-06-24 RX ADMIN — IOHEXOL 50 ML: 350 INJECTION, SOLUTION INTRAVENOUS at 11:51

## 2019-06-24 RX ADMIN — IOHEXOL 100 ML: 350 INJECTION, SOLUTION INTRAVENOUS at 11:52

## 2019-06-24 NOTE — ED TRIAGE NOTES
Chief Complaint   Patient presents with   • Possible Stroke     right deficit onset 10am     Pt bib ems, per report, pt was bus stop and  noted pt not responding. When ems arrived , noticed right sided weakness, no facial droop. fsbs 156.  Pt taken to CT, neurologist and erp at bedside.  GAve report to Stan MONTIEL

## 2019-06-24 NOTE — PROCEDURES
ROUTINE ELECTROENCEPHALOGRAM REPORT      Referring provider: Dr. Carmen Acevedo    DOS: 6/24/19 (total recording of 26 minutes)    INDICATION:  Brannon Silver 64 y.o. male presenting with Generalized weakness.    CURRENT ANTIEPILEPTIC REGIMEN: N/A    TECHNIQUE: 30 channel routine electroencephalogram (EEG) was performed in accordance with the international 10-20 system. The study was reviewed in bipolar and referential montages. The recording examined the patient during wakeful and drowsy/sleep state(s).     DESCRIPTION OF THE RECORD:  The EEG during wakefulness shows an 8Hz activity over the posterior head regions. There is prominent drowsiness throughout the record. No epileptiform discharges were seen. During the sleep recording symmetric POSTS, V waves, K complexes were seen.     ACTIVATION PROCEDURES:   Hyperventilation was not performed. Photic stimulation did not produce a driving response.      ICTAL AND/OR INTERICTAL FINDINGS:   No focal or generalized epileptiform activity noted. No regional slowing was seen during this routine study.  No clinical events or seizures were reported or recorded during the study.     EKG: sampling of the EKG recording demonstrated sinus rhythm.       INTERPRETATION:  This is a normal awake through N2 EEG. There is prominent drowsiness throughout the record. No epileptiform discharges or subclinical seizures were seen.     Note: A normal EEG does not rule out epilepsy.  If the clinical suspicion remains high for seizures, a prolonged recording to capture clinical or subclinical events may be helpful.    Andres Comer M.D., Diplomat of the American Board of Psychiatry and Neurology  Diplomat of [employer] Epilepsy Subspecialty   Assistant Clinical Professor, [facility] Neurology Consultant

## 2019-06-24 NOTE — CONSULTS
"CC:  \"code stroke\"    Date of Admission: 6/24/2019    Today's Date: 06/24/19    Requesting  Physician: Gaurang Macdonald M.D.      HPI:    Brannon Silver is a 64 y.o. male R handed, with onset at 10 am per EMS that at a bus station they saw him sliding to the Right, and with some generalized weakness but more so in the R.  He was not very responsive.  CT/CTA personally reviewed per me, no LVO, has old CVA  Also prominent frontal bone changes, possible previous surgery.  By my examination patient was slowly responding, no aphasic, and mobilizing all four equally weak.  He has history of seizures as well, [facility]  Since not though this is stroke, but most likely seizure activity, no TPA to give          HISTORIES AS ABOVE ARE INCOMPLETE IF PATIENT IS INTUBATED, OR UNABLE TO COMMUNICATE OR ELUCIDATE.    ROS:   Constitutional: No fevers or chills.  Eyes: No blurry vision or eye pain.  ENT: No dysphagia or hearing loss.  Respiratory: No cough or shortness of breath.  Cardiovascular: No chest pain or palpitations.  GI: No nausea, vomiting, or diarrhea.  : No urinary incontinence or dysuria.  Musculoskeletal: No joint swelling or arthralgias.  Skin: No skin rashes.  Neuro: See HPI.  Endocrine: No heat or cold intolerance. No polydipsia or polyuria.  Psych: No depression or anxiety.  Heme/Lymph: No easy bruising or swollen lymph nodes.  All other systems were reviewed and were negative.     All other systems were reviewed and were negative.      Past Medical History:   Past Medical History:   Diagnosis Date   • History of brain cancer    • Hypertension    • Valvular heart disease        Past Surgical History:   Past Surgical History:   Procedure Laterality Date   • CRANIOTOMY         Social History:   Social History     Social History   • Marital status: Single     Spouse name: N/A   • Number of children: N/A   • Years of education: N/A     Occupational History   • Not on file.     Social History Main " "Topics   • Smoking status: Former Smoker     Quit date: 3/26/2008   • Smokeless tobacco: Never Used   • Alcohol use Not on file   • Drug use: Unknown   • Sexual activity: Not on file     Other Topics Concern   • Not on file     Social History Narrative   • No narrative on file       Family History:   Family History   Problem Relation Age of Onset   • Heart Disease Neg Hx        Allergies:   Allergies   Allergen Reactions   • Pcn [Penicillins]        No current facility-administered medications for this encounter.     Current Outpatient Prescriptions:   •  aspirin  MG TBEC, Take 1 Tab by mouth every day., Disp: 30 Tab, Rfl: 0  •  atorvastatin (LIPITOR) 40 MG TABS, Take 1 Tab by mouth every bedtime., Disp: 30 Each, Rfl: 0  •  clopidogrel (PLAVIX) 75 MG TABS, Take 2 Tabs by mouth every day., Disp: 30 Each, Rfl: 0  •  lisinopril (PRINIVIL) 20 MG TABS, Take 1 Tab by mouth every day., Disp: 30 Each, Rfl: 0  •  metoprolol (LOPRESSOR) 100 MG TABS, Take 1 Tab by mouth 2 Times a Day., Disp: 30 Each, Rfl: 0      PHYSICAL EXAM    Vitals:    06/24/19 1155 06/24/19 1156   BP:  127/84   Pulse:  82   Resp:  18   Temp:  36.6 °C (97.9 °F)   TempSrc:  Temporal   SpO2:  98%   Weight: 101.6 kg (224 lb)    Height: 1.803 m (5' 11\")        Constitutional: Obese, with apathy, no distress    Head/Neck: NCAT. no meningismus neg kernig neg brudzinski. No obvious mass or heard bruit. No tender arteries or lost pulses. No rash of head or neck.    Cardiovascular: Regular, rhythmic    Pulmonary: Normal breath sounds    Extremities: the joints are  No contracted , No edema, normal pulses    Skin: Warm, dry, intact. No rashes observed head/neck or body  No stigmata     Eyes: normal conjunctiva.    Psych:apathy.  Mental Status: Prominent psychomotor slowing, Awake, alert, oriented x 3.  No dysarthria no aphasia.      Cranial Nerves: CN II-XII intact. Pupillary reflex + 4  No afferent pupillary defect. EOM full;  VF full; No nystagmus. "       Motor:  All four 3/5     Sensory: symmetric to all modalities.    Coordination: dysmetria absent    DTR's: + 1;  no clonus.    Babinski: neg    Gait/Station: n/a fall concern       NIHSS:     1a: level of conciousness0  1b:month/age1  1c:open eyes/close hand0  2. Best gaze0  3:Visual fields0  4: facial palsy0  5: a motor left arm1  5 b: motor right arm1  6 a: motor left leg1  6 b : motor right leg1  7: limb ataxia0  8: sensory0  9: best language0  10: dysarthria0  11: extinction /neglect: 0    Total: 2            Labs:  Recent Labs      06/24/19   1132   WBC  5.9   RBC  4.15*   HEMOGLOBIN  11.3*   HEMATOCRIT  34.7*   MCV  83.6   MCH  27.2   MCHC  32.6*   RDW  45.0   PLATELETCT  162*   MPV  12.1                         No results for input(s): SODIUM, POTASSIUM, CHLORIDE, CO2, GLUCOSE, BUN, CPKTOTAL in the last 72 hours.  No results for input(s): SODIUM, POTASSIUM, CHLORIDE, CO2, BUN, CREATININE, MAGNESIUM, PHOSPHORUS, CALCIUM in the last 72 hours.      Results for orders placed or performed during the hospital encounter of 07/16/11   ECHOCARDIOGRAM COMP W/O CONT   Result Value Ref Range    Eject.Frac. MOD BP 39.72     Eject.Frac. MOD 4C 41.19     Eject.Frac. MOD 2C 37.01               Imaging: neuroimaging reviewed and directly visualized by me  CT-CEREBRAL PERFUSION ANALYSIS   Final Result      1.  Cerebral blood flow less than 30% likely representing completed infarct = 0 mL.      2.  T Max more than 6 seconds likely representing combination of completed infarct and ischemia = 40 mL.      3.  Mismatched volume likely representing ischemic brain/penumbra = 40 mL      4.  Please note that the cerebral perfusion was performed on the limited brain tissue around the basal ganglia region. Infarct/ischemia outside the CT perfusion sections can be missed in this study.      CT-HEAD W/O   Final Result      1.  Cerebral atrophy.      2.  White matter lucencies most consistent with small vessel ischemic change versus  demyelination or gliosis.      3.  Otherwise, Head CT without contrast with no acute findings. No evidence of acute cerebral infarction, hemorrhage or mass lesion.      DX-CHEST-PORTABLE (1 VIEW)    (Results Pending)   CT-CTA HEAD WITH & W/O-POST PROCESS    (Results Pending)   CT-CTA NECK WITH & W/O-POST PROCESSING    (Results Pending)       Assessment/Plan:    Transient neurological deficit: with history of seizures, but also previous strokes.  Rapidly improving, but keeping very slow cognition, patient is saying he is hearing voices as well now.  EEG stat for ? Seizure activity.  MRI brain,   If MRI brain is negative, then neurology will sign off.  [facility] is [facility].  ASA for now.  ECHO and telemetry are also indicated.          At time of this admission the possible Toast classification is :     ) Large artery atherosclerosis   ) Cardioembolism   ) Small vessel disease  x ) Stroke of undetermined etiology not suspected being stroke           Carmen Reddy M.D.    Clinical Professor, [employer]  Diplomate, Neurology , Vascular Neurology, Neurophysiology

## 2019-06-24 NOTE — ED PROVIDER NOTES
ED Provider Note    CHIEF COMPLAINT  Chief Complaint   Patient presents with   • Possible Stroke     right deficit onset 10am       HPI  Brannon Silver is a 64 y.o. male who presents for evaluation of possible stroke syndrome.  The patient was [place] in from [place] local bus stop.  Apparently he was normal at around 930 to 10:00.  At approximately 1115  was concerned because the patient was confused and had flaccid paralysis of the right upper and lower extremity and was mumbling incomprehensible words.  Patient himself cannot provide any meaningful history on arrival.  Once his mental status improved he reported that he is primarily a [facility] patient, he has prior history of brain tumor and possible seizure disorder.  He denies any headache, no other symptoms reported no drugs or alcohol    REVIEW OF SYSTEMS  See HPI for further details.  Limited review of systems on initial evaluation l other systems are negative.     PAST MEDICAL HISTORY  Past Medical History:   Diagnosis Date   • History of brain cancer    • Hypertension    • Valvular heart disease        FAMILY HISTORY  Noncontributory    SOCIAL HISTORY  Social History     Social History   • Marital status: Single     Spouse name: N/A   • Number of children: N/A   • Years of education: N/A     Social History Main Topics   • Smoking status: Former Smoker     Quit date: 3/26/2008   • Smokeless tobacco: Never Used   • Alcohol use Not on file   • Drug use: Unknown   • Sexual activity: Not on file     Other Topics Concern   • Not on file     Social History Narrative   • No narrative on file     Single denies IV drugs or alcohol  SURGICAL HISTORY  Past Surgical History:   Procedure Laterality Date   • CRANIOTOMY         CURRENT MEDICATIONS  No current facility-administered medications for this encounter.     Current Outpatient Prescriptions:   •  aspirin  MG TBEC, Take 1 Tab by mouth every day., Disp: 30 Tab, Rfl: 0  •  atorvastatin  "(LIPITOR) 40 MG TABS, Take 1 Tab by mouth every bedtime., Disp: 30 Each, Rfl: 0  •  clopidogrel (PLAVIX) 75 MG TABS, Take 2 Tabs by mouth every day., Disp: 30 Each, Rfl: 0  •  lisinopril (PRINIVIL) 20 MG TABS, Take 1 Tab by mouth every day., Disp: 30 Each, Rfl: 0  •  metoprolol (LOPRESSOR) 100 MG TABS, Take 1 Tab by mouth 2 Times a Day., Disp: 30 Each, Rfl: 0      ALLERGIES  Allergies   Allergen Reactions   • Pcn [Penicillins]        PHYSICAL EXAM  VITAL SIGNS: /84   Pulse 82   Temp 36.6 °C (97.9 °F) (Temporal)   Resp 18   Ht 1.803 m (5' 11\")   Wt 101.6 kg (224 lb)   SpO2 98%   BMI 31.24 kg/m²       Constitutional: Well developed, Well nourished, No acute distress, Non-toxic appearance.   HENT: Normocephalic, Atraumatic, Bilateral external ears normal, Oropharynx moist, No oral exudates, Nose normal.   Eyes: PERRLA, EOMI, Conjunctiva normal, No discharge.   Neck: Normal range of motion, No tenderness, Supple, No stridor.   Cardiovascular: Normal heart rate, Normal rhythm, No murmurs, No rubs, No gallops.   Thorax & Lungs: Normal breath sounds, No respiratory distress, No wheezing, No chest tenderness.   Abdomen: Bowel sounds normal, Soft, No tenderness, No masses, No pulsatile masses.   Skin: Warm, Dry, No erythema, No rash.   Back: No tenderness, No CVA tenderness.   Extremities: Intact distal pulses, No edema, No tenderness, No cyanosis, No clubbing.   Musculoskeletal: Good range of motion in all major joints. No tenderness to palpation or major deformities noted.   Neurologic: Generally confused, slurred speech, severe dysarthria noted follows one-step commands, flaccid paralysis of the right upper and lower extremities.  Patient appears to have some neglect to the right side cannot participate in finger-nose testing or heel-to-shin on the right upper and lower extremities.  NIH stroke scale score of 17  Psychiatric: Sluggish    RADIOLOGY/PROCEDURES  DX-CHEST-PORTABLE (1 VIEW)   Final Result      1.  " There is no acute cardiopulmonary process.   2.  Trace of left CP angle pleural fluid/pleural thickening is unchanged.      CT-CTA HEAD WITH & W/O-POST PROCESS   Final Result      1.  There is complete occlusion of the mid left A2 segment with associated atherosclerosis. There is corresponds to the abnormality seen on the perfusion exam.      Findings were discussed with DINA AGOSTO on 6/24/2019 at 12:27 PM.      CT-CTA NECK WITH & W/O-POST PROCESSING   Final Result      1.  There is atherosclerosis in both carotid bulbs and internal carotid artery origins with estimated less than 50% stenosis bilaterally.   2.  There is no evidence of carotid artery occlusion.   3.  Vertebral arteries demonstrate no dissection or occlusion.   4.  Stable 5 mm right lung apical nodule since 7/5/2018.   5.  Slight asymmetry of soft tissues in the posterior oropharynx, left greater than right. This could be inflammatory or less likely neoplastic. Follow-up with ENT on a nonemergent basis is recommended.      CT-CEREBRAL PERFUSION ANALYSIS   Final Result      1.  Cerebral blood flow less than 30% likely representing completed infarct = 0 mL.      2.  T Max more than 6 seconds likely representing combination of completed infarct and ischemia = 40 mL.      3.  Mismatched volume likely representing ischemic brain/penumbra = 40 mL      4.  Please note that the cerebral perfusion was performed on the limited brain tissue around the basal ganglia region. Infarct/ischemia outside the CT perfusion sections can be missed in this study.      CT-HEAD W/O   Final Result      1.  Cerebral atrophy.      2.  White matter lucencies most consistent with small vessel ischemic change versus demyelination or gliosis.      3.  Otherwise, Head CT without contrast with no acute findings. No evidence of acute cerebral infarction, hemorrhage or mass lesion.        Results for orders placed or performed during the hospital encounter of 06/24/19   CBC WITH  DIFFERENTIAL   Result Value Ref Range    WBC 5.9 4.8 - 10.8 K/uL    RBC 4.15 (L) 4.70 - 6.10 M/uL    Hemoglobin 11.3 (L) 14.0 - 18.0 g/dL    Hematocrit 34.7 (L) 42.0 - 52.0 %    MCV 83.6 81.4 - 97.8 fL    MCH 27.2 27.0 - 33.0 pg    MCHC 32.6 (L) 33.7 - 35.3 g/dL    RDW 45.0 35.9 - 50.0 fL    Platelet Count 162 (L) 164 - 446 K/uL    MPV 12.1 9.0 - 12.9 fL    Neutrophils-Polys 70.00 44.00 - 72.00 %    Lymphocytes 16.90 (L) 22.00 - 41.00 %    Monocytes 9.00 0.00 - 13.40 %    Eosinophils 3.20 0.00 - 6.90 %    Basophils 0.70 0.00 - 1.80 %    Immature Granulocytes 0.20 0.00 - 0.90 %    Nucleated RBC 0.00 /100 WBC    Neutrophils (Absolute) 4.15 1.82 - 7.42 K/uL    Lymphs (Absolute) 1.00 1.00 - 4.80 K/uL    Monos (Absolute) 0.53 0.00 - 0.85 K/uL    Eos (Absolute) 0.19 0.00 - 0.51 K/uL    Baso (Absolute) 0.04 0.00 - 0.12 K/uL    Immature Granulocytes (abs) 0.01 0.00 - 0.11 K/uL    NRBC (Absolute) 0.00 K/uL   COMP METABOLIC PANEL   Result Value Ref Range    Sodium 140 135 - 145 mmol/L    Potassium 4.3 3.6 - 5.5 mmol/L    Chloride 113 (H) 96 - 112 mmol/L    Co2 18 (L) 20 - 33 mmol/L    Anion Gap 9.0 0.0 - 11.9    Glucose 140 (H) 65 - 99 mg/dL    Bun 40 (H) 8 - 22 mg/dL    Creatinine 2.45 (H) 0.50 - 1.40 mg/dL    Calcium 9.4 8.5 - 10.5 mg/dL    AST(SGOT) 12 12 - 45 U/L    ALT(SGPT) 6 2 - 50 U/L    Alkaline Phosphatase 122 (H) 30 - 99 U/L    Total Bilirubin 0.3 0.1 - 1.5 mg/dL    Albumin 3.7 3.2 - 4.9 g/dL    Total Protein 7.4 6.0 - 8.2 g/dL    Globulin 3.7 (H) 1.9 - 3.5 g/dL    A-G Ratio 1.0 g/dL   PROTHROMBIN TIME   Result Value Ref Range    PT 13.7 12.0 - 14.6 sec    INR 1.02 0.87 - 1.13   APTT   Result Value Ref Range    APTT 26.9 24.7 - 36.0 sec   COD (ADULT)   Result Value Ref Range    ABO Grouping Only B     Rh Grouping Only POS     Antibody Screen-Cod NEG    TROPONIN   Result Value Ref Range    Troponin I 0.07 (H) 0.00 - 0.04 ng/mL   DIAGNOSTIC ALCOHOL   Result Value Ref Range    Diagnostic Alcohol 0.00 0.00 g/dL    ESTIMATED GFR   Result Value Ref Range    GFR If  32 (A) >60 mL/min/1.73 m 2    GFR If Non  27 (A) >60 mL/min/1.73 m 2   EKG (NOW)   Result Value Ref Range    Report       [facility]    Test Date:  2019  Pt Name:    TUSHAR YARBROUGH                Department: ER  MRN:        [id number]                      Room:        18  Gender:     Male                         Technician: [id number]  :        1955                   Requested By:DINA AGOSTO  Order #:    [id number]                    Reading MD:    Measurements  Intervals                                Axis  Rate:       74                           P:          54  IA:         174                          QRS:        -17  QRSD:       103                          T:          119  QT:         403  QTc:        448    Interpretive Statements  Sinus rhythm  Probable left atrial enlargement  LVH with secondary repolarization abnormality  Inferior infarct, old  Compared to ECG 2018 10:55:40  Left ventricular hypertrophy now present  Myocardial infarct finding now present  Intraventricular conduction delay no longer present  Possible ischemia no longer present       EKG interpretation by me rate 74 sinus rhythm LVH is noted no pathological T wave inversions possible ischemia noted but no classic ST segment elevation    COURSE & MEDICAL DECISION MAKING  Pertinent Labs & Imaging studies reviewed. (See chart for details)  Patient presented here with a profound strokelike syndrome.  We reviewed his records.  When he went to CT scan he came back and his symptoms were dramatically improved therefore suggesting that alteplase therapy is not indicated.  Dr. Chino feels that this is more likely atypical seizure activity given the patient's history of prior brain tumor.  There was a small abnormality on his perfusion study but it is apparently too small for interventional radiology to intervene.  The  patient will have a stat electroencephalogram and be admitted to internal medicine    FINAL IMPRESSION  1.  Acute TIA versus atypical seizure disorder    Admission         Electronically signed by: Gaurang Macdonald, 6/24/2019 11:36 AM

## 2019-06-25 PROBLEM — Z95.2 H/O AORTIC VALVE REPLACEMENT: Status: ACTIVE | Noted: 2019-06-25

## 2019-06-25 PROBLEM — D64.9 ANEMIA: Status: ACTIVE | Noted: 2019-06-25

## 2019-06-25 PROBLEM — N17.9 AKI (ACUTE KIDNEY INJURY) (HCC): Status: ACTIVE | Noted: 2019-06-25

## 2019-06-25 PROBLEM — N18.30 ACUTE RENAL FAILURE SUPERIMPOSED ON STAGE 3 CHRONIC KIDNEY DISEASE (HCC): Status: ACTIVE | Noted: 2019-06-25

## 2019-06-25 PROBLEM — D49.6 BRAIN TUMOR (HCC): Status: ACTIVE | Noted: 2019-06-25

## 2019-06-25 LAB
CHOLEST SERPL-MCNC: 124 MG/DL (ref 100–199)
HDLC SERPL-MCNC: 42 MG/DL
LDLC SERPL CALC-MCNC: 66 MG/DL
TRIGL SERPL-MCNC: 79 MG/DL (ref 0–149)

## 2019-06-25 PROCEDURE — A9270 NON-COVERED ITEM OR SERVICE: HCPCS | Performed by: HOSPITALIST

## 2019-06-25 PROCEDURE — 36415 COLL VENOUS BLD VENIPUNCTURE: CPT

## 2019-06-25 PROCEDURE — 97161 PT EVAL LOW COMPLEX 20 MIN: CPT

## 2019-06-25 PROCEDURE — 80061 LIPID PANEL: CPT

## 2019-06-25 PROCEDURE — 99231 SBSQ HOSP IP/OBS SF/LOW 25: CPT | Mod: GC | Performed by: PSYCHIATRY & NEUROLOGY

## 2019-06-25 PROCEDURE — 770020 HCHG ROOM/CARE - TELE (206)

## 2019-06-25 PROCEDURE — 99233 SBSQ HOSP IP/OBS HIGH 50: CPT | Performed by: INTERNAL MEDICINE

## 2019-06-25 PROCEDURE — 700111 HCHG RX REV CODE 636 W/ 250 OVERRIDE (IP): Performed by: INTERNAL MEDICINE

## 2019-06-25 PROCEDURE — 700102 HCHG RX REV CODE 250 W/ 637 OVERRIDE(OP): Performed by: HOSPITALIST

## 2019-06-25 PROCEDURE — 97166 OT EVAL MOD COMPLEX 45 MIN: CPT

## 2019-06-25 RX ORDER — HEPARIN SODIUM 5000 [USP'U]/ML
5000 INJECTION, SOLUTION INTRAVENOUS; SUBCUTANEOUS EVERY 8 HOURS
Status: DISCONTINUED | OUTPATIENT
Start: 2019-06-25 | End: 2019-07-03 | Stop reason: HOSPADM

## 2019-06-25 RX ADMIN — ATORVASTATIN CALCIUM 80 MG: 80 TABLET, FILM COATED ORAL at 18:12

## 2019-06-25 RX ADMIN — HEPARIN SODIUM 5000 UNITS: 5000 INJECTION, SOLUTION INTRAVENOUS; SUBCUTANEOUS at 21:46

## 2019-06-25 RX ADMIN — ASPIRIN 325 MG: 325 TABLET, FILM COATED ORAL at 04:49

## 2019-06-25 ASSESSMENT — COGNITIVE AND FUNCTIONAL STATUS - GENERAL
SUGGESTED CMS G CODE MODIFIER DAILY ACTIVITY: CK
CLIMB 3 TO 5 STEPS WITH RAILING: A LITTLE
SUGGESTED CMS G CODE MODIFIER MOBILITY: CK
TOILETING: A LOT
MOBILITY SCORE: 19
HELP NEEDED FOR BATHING: A LOT
DRESSING REGULAR LOWER BODY CLOTHING: A LOT
MOVING FROM LYING ON BACK TO SITTING ON SIDE OF FLAT BED: A LITTLE
WALKING IN HOSPITAL ROOM: A LITTLE
DAILY ACTIVITIY SCORE: 15
MOVING TO AND FROM BED TO CHAIR: A LITTLE
DRESSING REGULAR UPPER BODY CLOTHING: A LITTLE
STANDING UP FROM CHAIR USING ARMS: A LITTLE
PERSONAL GROOMING: A LITTLE
EATING MEALS: A LITTLE

## 2019-06-25 ASSESSMENT — ENCOUNTER SYMPTOMS
FEVER: 0
DEPRESSION: 0
VOMITING: 0
NAUSEA: 0
BRUISES/BLEEDS EASILY: 0
MYALGIAS: 0
DIZZINESS: 0
PALPITATIONS: 0
CHILLS: 0
SHORTNESS OF BREATH: 0
ABDOMINAL PAIN: 0
PHOTOPHOBIA: 0
DOUBLE VISION: 0
FOCAL WEAKNESS: 1
HEADACHES: 0

## 2019-06-25 ASSESSMENT — GAIT ASSESSMENTS
GAIT LEVEL OF ASSIST: MINIMAL ASSIST
DISTANCE (FEET): 200
ASSISTIVE DEVICE: SINGLE POINT CANE

## 2019-06-25 ASSESSMENT — ACTIVITIES OF DAILY LIVING (ADL): TOILETING: INDEPENDENT

## 2019-06-25 NOTE — ASSESSMENT & PLAN NOTE
MRI brain showed embolic strokes  Started on aspirin, statin, dvt prophylaxis.  LDL 66  A1c 6.1%, prediabetes education ordered  TTE done, negative bubble study, no thrombus seen  Placed on telemetry to monitor for arrhythmias.  Continue with PT/OT  Will need 30-day cardiac event [facility]  Post permissive hypertension periods-continue Norvasc, beta-blocker-monitor blood pressure response

## 2019-06-25 NOTE — DISCHARGE PLANNING
Anticipated Discharge Disposition:   TBD    Action:    Spoke with patient's mental health , Yoli with [facility] [phone] ext. [phone].  He was living in 2nd level apartment using a cane.  She is in process of moving patient to ground level apartment.  He has [facility] for personnel care and house cleaning.  She can request additional caregiving hours.    Henry Raymond RN CM with [facility].  Pt is not service connected.  Contact for [facility] will be Hannah [phone].    OT recommending post-acute placement for additional OT therapies    [facility] text to Dr. Barajas for SLP eval    [facility] text to [facility] for consideration of physiatry consult after therapy evals.    Barriers to Discharge:    Medical clearance    Plan:    Collaborate with patient and medical team.

## 2019-06-25 NOTE — THERAPY
"Physical Therapy Evaluation completed.   Bed Mobility:  Supine to Sit:  (sitting up EOB)  Transfers: Sit to Stand: Minimal Assist (LOB leans back on bed a bit. )  Gait: Level Of Assist: Minimal Assist (multiple standing rests as pt talked, got distracted. ) with Single Point Cane       Plan of Care: Will benefit from Physical Therapy 5 times per week  Discharge Recommendations: Equipment: Will Continue to Assess for Equipment Needs.  Pt is a 63 yo male admitted with confusion and R side weakness. MRI + for Scattered punctate acute infarcts in the left frontal and parietal lobes,  frontal lobe encephalomalacia  and Chronic bilateral cerebellar lacunar infarcts. Today, pt was irritable at first but cooperative with therapy. Pt was challenged by task like donning socks and easily distracted from tasks. Pt was able to perform sit to stand with min A for safety due to backward lean. Pt then stated that he didn't like to have a person behind him, and declined offer of FWW instead of cane. Pt had a few losses of balance during ambulation x 200 feet with SPC. Pt will need to be trialed on stairs as well as he lives on second floor. Pt may need a transitional care stay upon d/c depending on progress. See \"Rehab Therapy-Acute\" Patient Summary Report for complete documentation.     "

## 2019-06-25 NOTE — CARE PLAN
Problem: Safety  Goal: Will remain free from injury  Outcome: PROGRESSING AS EXPECTED  Safety education provided.     Problem: Communication:  Goal: The ability to communicate needs accurately and effectively will improve  POC discussed with patient    Problem: Safety:  Goal: Will remain free from injury  Outcome: PROGRESSING AS EXPECTED  Safety education provided.

## 2019-06-25 NOTE — PROGRESS NOTES
"AOx4, negative for right-sided weakness, patient appears slowed but no aphasia. Agitated at times, states he would rather be at the \"[facility]\". Denies pain. Uses urinal. Seizure precautions in place.  "

## 2019-06-25 NOTE — DISCHARGE PLANNING
Aware of PMR referral from Dr. Daigle. MRI brain - scattered punctate acute infarcts in the left frontal and parietal lobes. Workup ongoing, therapy evals pending. No Physiatry consult ordered per protocol at this time. TCC following. Thank you for the referral.

## 2019-06-25 NOTE — CARE PLAN
Problem: Safety  Goal: Will remain free from injury  Outcome: PROGRESSING AS EXPECTED  Reviewed patient's mobility status, discussed with care team of patient needs, verifying appropriate safety precautions in place, providing patient education, ensuring call lights are within reach, non-slip socks in use, evaluating needs alarms & monitoring every shift, continuing with current plan of care.      Problem: Knowledge Deficit  Goal: Knowledge of disease process/condition, treatment plan, diagnostic tests, and medications will improve  Outcome: PROGRESSING AS EXPECTED  Educated patient about POC, activities, encouraging patient to ask questions, providing answers to patient's questions, educating patient about medications, encouraging patient involvement in care process. Continuing with current POC.      Problem: Safety:  Goal: Will remain free from injury  Outcome: PROGRESSING AS EXPECTED  Reviewed patient's mobility status, discussed with care team of patient needs, verifying appropriate safety precautions in place, providing patient education, ensuring call lights are within reach, non-slip socks in use, evaluating needs alarms & monitoring every shift, continuing with current plan of care.      Problem: Knowledge Deficit:  Goal: Knowledge of disease process/condition, treatment plan, diagnostic tests, and medications will improve  Outcome: PROGRESSING AS EXPECTED  Educated patient about POC, activities, encouraging patient to ask questions, providing answers to patient's questions, educating patient about medications, encouraging patient involvement in care process. Continuing with current POC.

## 2019-06-25 NOTE — H&P
Hospital Medicine History & Physical Note    Date of Service  6/24/2019    Primary Care Physician  Pcp Not In Computer    Consultants  Dr lopez neuro    Code Status  full    Chief Complaint  Right side weakness    History of Presenting Illness  64 y.o. male who presented 6/24/2019 with PMX HTN who was brought to ED for evaluation. Around 11 15, pt was found to have confusion and right side weakness. They comment on slurred speech.in ED - he symptoms improved so he was not a tpa candidate. He was referred to neurology md    No fever or chills    Review of Systems  Review of Systems   Unable to perform ROS: Mental status change       Past Medical History   has a past medical history of History of brain cancer; Hypertension; and Valvular heart disease.    Surgical History   has a past surgical history that includes craniotomy.     Family History  No family hx of stroke    Social History   reports that he quit smoking about 11 years ago. He has never used smokeless tobacco.    Allergies  Allergies   Allergen Reactions   • Pcn [Penicillins]        Medications  Prior to Admission Medications   Prescriptions Last Dose Informant Patient Reported? Taking?   amLODIPine (NORVASC) 10 MG Tab UNK at [facility] Patient's Home Pharmacy Yes Yes   Sig: Take 10 mg by mouth every day.   aspirin  MG TBEC UNK at [facility] Patient[facility] Pharm[facility] No No   Sig: Take 1 Tab by mouth every day.   atorvastatin (LIPITOR) 40 MG TABS UNK at [facility] Patient[facility] Pharm[facility] No No   Sig: Take 1 Tab by mouth every bedtime.   carvedilol (COREG) 6.25 MG Tab UNK at [facility] Patient's Home Pharmacy Yes Yes   Sig: Take 6.25 mg by mouth 2 times a day, with meals.   ferrous sulfate 325 (65 Fe) MG tablet UNK at [facility] Patient's Home Pharmacy Yes Yes   Sig: Take 325 mg by mouth 3 times a day.   finasteride (PROSCAR) 5 MG Tab UNK at [facility] Patient's Home Pharmacy Yes Yes   Sig: Take 5 mg by mouth every day.   tamsulosin (FLOMAX) 0.4 MG capsule UNK at [facility] Patient's Home Pharmacy Yes  Yes   Sig: Take 0.8 mg by mouth every day.   therapeutic multivitamin-minerals (THERAGRAN-M) Tab UNK at [facility] Patient's Home Pharmacy Yes Yes   Sig: Take 1 Tab by mouth every day.      Facility-Administered Medications: None       Physical Exam  Temp:  [36.6 °C (97.9 °F)] 36.6 °C (97.9 °F)  Pulse:  [82-86] 86  Resp:  [13-20] 18  BP: (127-147)/(68-84) 147/68  SpO2:  [94 %-100 %] 94 %    Physical Exam   Constitutional: No distress.   HENT:   Head: Normocephalic.   Eyes: Pupils are equal, round, and reactive to light. EOM are normal. Right eye exhibits no discharge. Left eye exhibits no discharge. No scleral icterus.   Neck: No JVD present. No tracheal deviation present. No thyromegaly present.   Cardiovascular: Normal rate and regular rhythm.  Exam reveals no gallop and no friction rub.    No murmur heard.  Pulmonary/Chest: Effort normal and breath sounds normal. No respiratory distress. He has no wheezes. He has no rales.   Abdominal: Soft. Bowel sounds are normal. He exhibits no distension. There is no tenderness. There is no rebound.   Neurological: He is alert.   Confused    Slurred speech   Skin: Skin is warm and dry. He is not diaphoretic.   Psychiatric: He has a normal mood and affect.       Laboratory:  Recent Labs      06/24/19   1132   WBC  5.9   RBC  4.15*   HEMOGLOBIN  11.3*   HEMATOCRIT  34.7*   MCV  83.6   MCH  27.2   MCHC  32.6*   RDW  45.0   PLATELETCT  162*   MPV  12.1     Recent Labs      06/24/19   1132   SODIUM  140   POTASSIUM  4.3   CHLORIDE  113*   CO2  18*   GLUCOSE  140*   BUN  40*   CREATININE  2.45*   CALCIUM  9.4     Recent Labs      06/24/19   1132   ALTSGPT  6   ASTSGOT  12   ALKPHOSPHAT  122*   TBILIRUBIN  0.3   GLUCOSE  140*     Recent Labs      06/24/19   1132   APTT  26.9   INR  1.02             Recent Labs      06/24/19   1132   TROPONINI  0.07*       Urinalysis:    Recent Labs      06/24/19   1411   SPECGRAVITY  1.045   GLUCOSEUR  100*   KETONES  Negative   NITRITE  Negative    LEUKESTERAS  Negative   WBCURINE  0-2*   RBCURINE  0-2*   BACTERIA  Negative   EPITHELCELL  Negative        Imaging:  MR-BRAIN-W/O   Final Result      1.  Scattered punctate acute infarcts in the left frontal and parietal lobes.   2.  Small sized subacute to chronic right parieto-occipital infarct.   3.  Status post bifrontal cranioplasty.   4.  Right frontal lobe encephalomalacia which may be posttraumatic, ischemic or postsurgical in nature.   5.  Moderate diffuse cerebral substance loss.   6.  Advanced microangiopathic ischemic change versus demyelination or gliosis.   7.  Chronic ischemic pontine gliosis.   8.  Chronic bilateral cerebellar lacunar infarcts.   9.  Multiple focal areas of old hemorrhage in the bilateral cerebellar lobes consistent with chronic hypertensive microhemorrhage.      DX-CHEST-PORTABLE (1 VIEW)   Final Result      1.  There is no acute cardiopulmonary process.   2.  Trace of left CP angle pleural fluid/pleural thickening is unchanged.      CT-CTA HEAD WITH & W/O-POST PROCESS   Final Result      1.  There is complete occlusion of the mid left A2 segment with associated atherosclerosis. There is corresponds to the abnormality seen on the perfusion exam.      Findings were discussed with DINA AGOSTO on 6/24/2019 at 12:27 PM.      CT-CTA NECK WITH & W/O-POST PROCESSING   Final Result      1.  There is atherosclerosis in both carotid bulbs and internal carotid artery origins with estimated less than 50% stenosis bilaterally.   2.  There is no evidence of carotid artery occlusion.   3.  Vertebral arteries demonstrate no dissection or occlusion.   4.  Stable 5 mm right lung apical nodule since 7/5/2018.   5.  Slight asymmetry of soft tissues in the posterior oropharynx, left greater than right. This could be inflammatory or less likely neoplastic. Follow-up with ENT on a nonemergent basis is recommended.      CT-CEREBRAL PERFUSION ANALYSIS   Final Result      1.  Cerebral blood flow less  than 30% likely representing completed infarct = 0 mL.      2.  T Max more than 6 seconds likely representing combination of completed infarct and ischemia = 40 mL.      3.  Mismatched volume likely representing ischemic brain/penumbra = 40 mL      4.  Please note that the cerebral perfusion was performed on the limited brain tissue around the basal ganglia region. Infarct/ischemia outside the CT perfusion sections can be missed in this study.      CT-HEAD W/O   Final Result      1.  Cerebral atrophy.      2.  White matter lucencies most consistent with small vessel ischemic change versus demyelination or gliosis.      3.  Otherwise, Head CT without contrast with no acute findings. No evidence of acute cerebral infarction, hemorrhage or mass lesion.      EC-ECHOCARDIOGRAM COMPLETE W/O CONT    (Results Pending)         Assessment/Plan:  I anticipate this patient will require at least two midnights for appropriate medical management, necessitating inpatient admission.    * CVA (cerebral vascular accident) (HCC)- (present on admission)   Assessment & Plan    F/u mri btrain    Asa    Statin    Neuro    Speech, pt and ot    Check echo     History of seizure- (present on admission)   Assessment & Plan    Check eeg     Essential hypertension- (present on admission)   Assessment & Plan    Permissive HTN as per protocol     Cardiomyopathy (HCC)- (present on admission)   Assessment & Plan    Check echo         VTE prophylaxis: scd

## 2019-06-25 NOTE — PROGRESS NOTES
Neurology Progress Note    Chief complaint/ reason for interval visit  Stroke    Interval Problem Update  MRI Brain demonstrating punctate acute infarcts in the left frontal and parietal lobes  EEG unremarkable, no e/o seizure activity  No afib/flutter thus far    Review of Systems   Constitutional: Negative for chills and fever.   HENT: Negative for hearing loss.    Eyes: Negative for double vision and photophobia.   Cardiovascular: Negative for chest pain and palpitations.   Gastrointestinal: Negative for nausea and vomiting.   Genitourinary: Negative for dysuria and urgency.   Musculoskeletal: Negative for myalgias.   Skin: Negative for rash.   Neurological: Negative for dizziness and headaches.   Endo/Heme/Allergies: Does not bruise/bleed easily.   Psychiatric/Behavioral: Negative for depression.       Quality Measures  Quality-Core Measures        Physical Exam       Vitals:    06/24/19 1900 06/24/19 2000 06/25/19 0000 06/25/19 0400   BP:  147/68 139/87 135/78   Pulse:  86 83 74   Resp: 19 18 20 16   Temp:  36.6 °C (97.9 °F) 36.1 °C (97 °F) 36.6 °C (97.9 °F)   TempSrc:  Temporal Temporal Temporal   SpO2: 98% 94% 94% 96%   Weight:  101.8 kg (224 lb 6.9 oz)     Height:         Body mass index is 31.3 kg/m².  Oxygen Therapy:  Pulse Oximetry: 96 %, O2 Delivery: None (Room Air)    Physical Exam   Constitutional: He is oriented to person, place, and time and well-developed, well-nourished, and in no distress. No distress.   HENT:   Head: Atraumatic.   Right Ear: External ear normal.   Nose: Nose normal.   Well healed and old incision noted on scalp   Eyes: Pupils are equal, round, and reactive to light. Left eye exhibits no discharge.   stare   Neck: Neck supple. No JVD present.   Cardiovascular: Normal rate and regular rhythm.    No murmur heard.  Pulmonary/Chest: No respiratory distress.   Abdominal: Soft.   Musculoskeletal: He exhibits no edema.   Neurological: He is oriented to person, place, and time.   Skin:  Skin is warm and dry. He is not diaphoretic.     Lab Data Review:  Recent Results (from the past 24 hour(s))   CBC WITH DIFFERENTIAL    Collection Time: 06/24/19 11:32 AM   Result Value Ref Range    WBC 5.9 4.8 - 10.8 K/uL    RBC 4.15 (L) 4.70 - 6.10 M/uL    Hemoglobin 11.3 (L) 14.0 - 18.0 g/dL    Hematocrit 34.7 (L) 42.0 - 52.0 %    MCV 83.6 81.4 - 97.8 fL    MCH 27.2 27.0 - 33.0 pg    MCHC 32.6 (L) 33.7 - 35.3 g/dL    RDW 45.0 35.9 - 50.0 fL    Platelet Count 162 (L) 164 - 446 K/uL    MPV 12.1 9.0 - 12.9 fL    Neutrophils-Polys 70.00 44.00 - 72.00 %    Lymphocytes 16.90 (L) 22.00 - 41.00 %    Monocytes 9.00 0.00 - 13.40 %    Eosinophils 3.20 0.00 - 6.90 %    Basophils 0.70 0.00 - 1.80 %    Immature Granulocytes 0.20 0.00 - 0.90 %    Nucleated RBC 0.00 /100 WBC    Neutrophils (Absolute) 4.15 1.82 - 7.42 K/uL    Lymphs (Absolute) 1.00 1.00 - 4.80 K/uL    Monos (Absolute) 0.53 0.00 - 0.85 K/uL    Eos (Absolute) 0.19 0.00 - 0.51 K/uL    Baso (Absolute) 0.04 0.00 - 0.12 K/uL    Immature Granulocytes (abs) 0.01 0.00 - 0.11 K/uL    NRBC (Absolute) 0.00 K/uL   COMP METABOLIC PANEL    Collection Time: 06/24/19 11:32 AM   Result Value Ref Range    Sodium 140 135 - 145 mmol/L    Potassium 4.3 3.6 - 5.5 mmol/L    Chloride 113 (H) 96 - 112 mmol/L    Co2 18 (L) 20 - 33 mmol/L    Anion Gap 9.0 0.0 - 11.9    Glucose 140 (H) 65 - 99 mg/dL    Bun 40 (H) 8 - 22 mg/dL    Creatinine 2.45 (H) 0.50 - 1.40 mg/dL    Calcium 9.4 8.5 - 10.5 mg/dL    AST(SGOT) 12 12 - 45 U/L    ALT(SGPT) 6 2 - 50 U/L    Alkaline Phosphatase 122 (H) 30 - 99 U/L    Total Bilirubin 0.3 0.1 - 1.5 mg/dL    Albumin 3.7 3.2 - 4.9 g/dL    Total Protein 7.4 6.0 - 8.2 g/dL    Globulin 3.7 (H) 1.9 - 3.5 g/dL    A-G Ratio 1.0 g/dL   PROTHROMBIN TIME    Collection Time: 06/24/19 11:32 AM   Result Value Ref Range    PT 13.7 12.0 - 14.6 sec    INR 1.02 0.87 - 1.13   APTT    Collection Time: 06/24/19 11:32 AM   Result Value Ref Range    APTT 26.9 24.7 - 36.0 sec   COD  (ADULT)    Collection Time: 19 11:32 AM   Result Value Ref Range    ABO Grouping Only B     Rh Grouping Only POS     Antibody Screen-Cod NEG    TROPONIN    Collection Time: 19 11:32 AM   Result Value Ref Range    Troponin I 0.07 (H) 0.00 - 0.04 ng/mL   DIAGNOSTIC ALCOHOL    Collection Time: 19 11:32 AM   Result Value Ref Range    Diagnostic Alcohol 0.00 0.00 g/dL   ESTIMATED GFR    Collection Time: 19 11:32 AM   Result Value Ref Range    GFR If  32 (A) >60 mL/min/1.73 m 2    GFR If Non  27 (A) >60 mL/min/1.73 m 2   Hemoglobin A1C    Collection Time: 19 11:32 AM   Result Value Ref Range    Glycohemoglobin 6.1 (H) 0.0 - 5.6 %    Est Avg Glucose 128 mg/dL   EKG (NOW)    Collection Time: 19 12:20 PM   Result Value Ref Range    Report       [facility] Emergency Dept.    Test Date:  2019  Pt Name:    TUSHAR YARBROUGH                Department: ER  MRN:        [id number]                      Room:       [facility]  Gender:     Male                         Technician: [id number]  :        1955                   Requested By:DINA AGOSTO  Order #:    [id number]                    Reading MD:    Measurements  Intervals                                Axis  Rate:       74                           P:          54  CT:         174                          QRS:        -17  QRSD:       103                          T:          119  QT:         403  QTc:        448    Interpretive Statements  Sinus rhythm  Probable left atrial enlargement  LVH with secondary repolarization abnormality  Inferior infarct, old  Compared to ECG 2018 10:55:40  Left ventricular hypertrophy now present  Myocardial infarct finding now present  Intraventricular conduction delay no longer present  Possible ischemia no longer present     URINALYSIS CULTURE, IF INDICATED    Collection Time: 19  2:11 PM   Result Value Ref Range    Color Yellow     Character Clear      Specific Gravity 1.045 <1.035    Ph 5.0 5.0 - 8.0    Glucose 100 (A) Negative mg/dL    Ketones Negative Negative mg/dL    Protein 100 (A) Negative mg/dL    Bilirubin Negative Negative    Urobilinogen, Urine 0.2 Negative    Nitrite Negative Negative    Leukocyte Esterase Negative Negative    Occult Blood Negative Negative    Micro Urine Req Microscopic    URINE DRUG SCREEN    Collection Time: 06/24/19  2:11 PM   Result Value Ref Range    Amphetamines Urine Negative Negative    Barbiturates Negative Negative    Benzodiazepines Negative Negative    Cocaine Metabolite Negative Negative    Methadone Negative Negative    Opiates Negative Negative    Oxycodone Negative Negative    Phencyclidine -Pcp Negative Negative    Propoxyphene Negative Negative    Cannabinoid Metab Negative Negative   URINE MICROSCOPIC (W/UA)    Collection Time: 06/24/19  2:11 PM   Result Value Ref Range    WBC 0-2 (A) /hpf    RBC 0-2 (A) /hpf    Bacteria Negative None /hpf    Epithelial Cells Negative /hpf    Hyaline Cast 0-2 /lpf   Lipid Profile    Collection Time: 06/25/19  4:49 AM   Result Value Ref Range    Cholesterol,Tot 124 100 - 199 mg/dL    Triglycerides 79 0 - 149 mg/dL    HDL 42 >=40 mg/dL    LDL 66 <100 mg/dL       Assessment/Plan     Patient Active Problem List    Diagnosis Date Noted   • Essential hypertension 06/24/2019   • History of seizure 06/24/2019   • Severe aortic stenosis 07/26/2018   • Cardiomyopathy ([facility]) 07/26/2018   • CAD (coronary artery disease) 07/26/2018   • Stented coronary artery 07/26/2018   • AAA (abdominal aortic aneurysm) ([facility]) 07/26/2018   • CVA (cerebral vascular accident) ([facility]) 07/26/2018     MRI with acute infarcts in left frontal and parietal lobes. EEG negative for seizure.  Less likely seizure in hindsight. Examination is non-focal, no afib/flutter thus far on ECG or telemetry. Echo pending.   Suspect has baseline cognitive deficits that will need [facility] but I suspect he is  at his baseline right now as symptoms seemed to have resolved.  Continue ASA/high intensity statin  Follow up echo  Permissive HTN, neuro checks  Physiatry consult  PT/OT, eating now  We will continue to follow please call with questions

## 2019-06-25 NOTE — PROGRESS NOTES
[facility] Medicine Daily Progress Note    Date of Service  6/25/2019    Chief Complaint  64 y.o. male admitted 6/24/2019 with right side weakness.    Hospital Course    PMH HTN, h/o bioprosthetic heart valve, brain tumor s/p resection who presented with acute right side weakness.  EEG did not show seizure.  MRI showed multiple infarcts.      Interval Problem Update  MRI showed multiple embolic strokes.  TTE is pending  Patient says he still has some leg weakness    Consultants/Specialty  Neurology    Code Status  Full    Disposition  PTOTST  Lives in [facility].  His family is in [place].    Review of Systems  Review of Systems   Constitutional: Negative for malaise/fatigue.   HENT: Negative for congestion.    Respiratory: Negative for shortness of breath.    Cardiovascular: Negative for chest pain and palpitations.   Gastrointestinal: Negative for abdominal pain and nausea.   Musculoskeletal: Negative for myalgias.   Neurological: Positive for focal weakness. Negative for headaches.        Physical Exam  Temp:  [36.1 °C (97 °F)-36.6 °C (97.9 °F)] 36.6 °C (97.8 °F)  Pulse:  [74-90] 90  Resp:  [16-20] 16  BP: (135-153)/() 136/98  SpO2:  [94 %-100 %] 100 %    Physical Exam   Constitutional: He is oriented to person, place, and time. He appears well-developed and well-nourished.   HENT:   Mouth/Throat: Oropharynx is clear and moist.   Well-healed surgical scars to scalp   Eyes: Pupils are equal, round, and reactive to light. Conjunctivae and EOM are normal. Right eye exhibits no discharge. Left eye exhibits no discharge.   Cardiovascular: Normal rate and regular rhythm.    Pulmonary/Chest: Effort normal. He has no wheezes. He has no rales.   Abdominal: Soft. There is no tenderness. There is no rebound and no guarding.   Musculoskeletal: He exhibits no edema.   Neurological: He is alert and oriented to person, place, and time.   Slow speech  5 out of 5 strength upper extremities  5 out of 5 strength left lower  extremity  4 out of 5 strength right lower extremity   Skin: Skin is warm and dry.   Nursing note and vitals reviewed.      Fluids    Intake/Output Summary (Last 24 hours) at 06/25/19 1808  Last data filed at 06/25/19 0900   Gross per 24 hour   Intake              240 ml   Output              200 ml   Net               40 ml       Laboratory  Recent Labs      06/24/19   1132   WBC  5.9   RBC  4.15*   HEMOGLOBIN  11.3*   HEMATOCRIT  34.7*   MCV  83.6   MCH  27.2   MCHC  32.6*   RDW  45.0   PLATELETCT  162*   MPV  12.1     Recent Labs      06/24/19   1132   SODIUM  140   POTASSIUM  4.3   CHLORIDE  113*   CO2  18*   GLUCOSE  140*   BUN  40*   CREATININE  2.45*   CALCIUM  9.4     Recent Labs      06/24/19   1132   APTT  26.9   INR  1.02         Recent Labs      06/25/19   0449   TRIGLYCERIDE  79   HDL  42   LDL  66       Imaging  MR-BRAIN-W/O   Final Result      1.  Scattered punctate acute infarcts in the left frontal and parietal lobes.   2.  Small sized subacute to chronic right parieto-occipital infarct.   3.  Status post bifrontal cranioplasty.   4.  Right frontal lobe encephalomalacia which may be posttraumatic, ischemic or postsurgical in nature.   5.  Moderate diffuse cerebral substance loss.   6.  Advanced microangiopathic ischemic change versus demyelination or gliosis.   7.  Chronic ischemic pontine gliosis.   8.  Chronic bilateral cerebellar lacunar infarcts.   9.  Multiple focal areas of old hemorrhage in the bilateral cerebellar lobes consistent with chronic hypertensive microhemorrhage.      DX-CHEST-PORTABLE (1 VIEW)   Final Result      1.  There is no acute cardiopulmonary process.   2.  Trace of left CP angle pleural fluid/pleural thickening is unchanged.      CT-CTA HEAD WITH & W/O-POST PROCESS   Final Result      1.  There is complete occlusion of the mid left A2 segment with associated atherosclerosis. There is corresponds to the abnormality seen on the perfusion exam.      Findings were discussed  with DINA AGOSTO on 6/24/2019 at 12:27 PM.      CT-CTA NECK WITH & W/O-POST PROCESSING   Final Result      1.  There is atherosclerosis in both carotid bulbs and internal carotid artery origins with estimated less than 50% stenosis bilaterally.   2.  There is no evidence of carotid artery occlusion.   3.  Vertebral arteries demonstrate no dissection or occlusion.   4.  Stable 5 mm right lung apical nodule since 7/5/2018.   5.  Slight asymmetry of soft tissues in the posterior oropharynx, left greater than right. This could be inflammatory or less likely neoplastic. Follow-up with ENT on a nonemergent basis is recommended.      CT-CEREBRAL PERFUSION ANALYSIS   Final Result      1.  Cerebral blood flow less than 30% likely representing completed infarct = 0 mL.      2.  T Max more than 6 seconds likely representing combination of completed infarct and ischemia = 40 mL.      3.  Mismatched volume likely representing ischemic brain/penumbra = 40 mL      4.  Please note that the cerebral perfusion was performed on the limited brain tissue around the basal ganglia region. Infarct/ischemia outside the CT perfusion sections can be missed in this study.      CT-HEAD W/O   Final Result      1.  Cerebral atrophy.      2.  White matter lucencies most consistent with small vessel ischemic change versus demyelination or gliosis.      3.  Otherwise, Head CT without contrast with no acute findings. No evidence of acute cerebral infarction, hemorrhage or mass lesion.      EC-ECHOCARDIOGRAM COMPLETE W/O CONT    (Results Pending)        Assessment/Plan  * CVA (cerebral vascular accident) (HCC)- (present on admission)   Assessment & Plan    MRI brain showed embolic strokes  Started on aspirin, statin, dvt prophylaxis.  LDL 66  A1c 6.1%, prediabetes education ordered  TTE pending  Placed on telemetry to monitor for arrhythmias.  Permissive HTN  PT/OT/ST consulted. Stroke education ordered.  Will need 30-day cardiac event monitoring  "prior to discharge       Acute renal failure superimposed on stage 3 chronic kidney disease (HCC)   Assessment & Plan    UA showed protein  Recheck BMP tomorrow     Anemia   Assessment & Plan    Unclear cause  Recheck CBC, reticulocyte count     H/O aortic valve replacement   Assessment & Plan    \"Cow\" valve per patient, placed by [facility]  TTE pending     Brain tumor (HCC)   Assessment & Plan    History of resection many years ago       History of seizure- (present on admission)   Assessment & Plan    EEG negative for seizure     Essential hypertension- (present on admission)   Assessment & Plan    Permissive HTN as per protocol     Cardiomyopathy (HCC)- (present on admission)   Assessment & Plan    TTE pending  Holding Coreg for permissive hypertension          VTE prophylaxis: heparin      "

## 2019-06-25 NOTE — PROGRESS NOTES
Monitor summary: SR 81-90, OK .20, QRS .10, QT .40,with frequent PVCs and 6 beats of v. tach per strip from monitor room.

## 2019-06-25 NOTE — ASSESSMENT & PLAN NOTE
Carvedilol switched to metoprolol as patient was having nonsustained VT on maximal dose of carvedilol 25 twice daily  Continue Norvasc  Monitor blood pressure

## 2019-06-25 NOTE — THERAPY
"Occupational Therapy Evaluation completed.   Functional Status:  Pt seated EOB upon arrival.  Pt required max A to don socks and easily distracted, requiring max redirection to this task.  Pt stood with min A and walked hallway w/SPC.  Pt did have one significant LB backwards requiring assist to regain balance. Pt left EOB at end of session.  Plan of Care: Will benefit from Occupational Therapy 5 times per week  Discharge Recommendations:  Equipment: Will Continue to Assess for Equipment Needs. .Recommend post-acute placement for additional occupational therapy services prior to discharge home. Patient can tolerate post-acute therapies at a 5x/week frequency.    Pt is 65 y/o M seen for OT evaluation. Pt admitted with acute CVA (L frontal-parietal). Pt with hx of brain tumor. Pt currently with mild-moderate functional deficits and appears to have poor insight into deficits. Pt will continue to benefit from acute OT services.    See \"Rehab Therapy-Acute\" Patient Summary Report for complete documentation.    "

## 2019-06-25 NOTE — ASSESSMENT & PLAN NOTE
TTE here showed EF 55%  Carvedilol switch to metoprolol  tachycardia or episodes of V. Tach  Monitor telemetry

## 2019-06-26 ENCOUNTER — APPOINTMENT (OUTPATIENT)
Dept: RADIOLOGY | Facility: MEDICAL CENTER | Age: 64
DRG: 065 | End: 2019-06-26
Attending: INTERNAL MEDICINE
Payer: COMMERCIAL

## 2019-06-26 ENCOUNTER — APPOINTMENT (OUTPATIENT)
Dept: CARDIOLOGY | Facility: MEDICAL CENTER | Age: 64
DRG: 065 | End: 2019-06-26
Attending: HOSPITALIST
Payer: COMMERCIAL

## 2019-06-26 LAB
ANION GAP SERPL CALC-SCNC: 8 MMOL/L (ref 0–11.9)
BASOPHILS # BLD AUTO: 0.6 % (ref 0–1.8)
BASOPHILS # BLD: 0.04 K/UL (ref 0–0.12)
BUN SERPL-MCNC: 44 MG/DL (ref 8–22)
CALCIUM SERPL-MCNC: 8.8 MG/DL (ref 8.5–10.5)
CHLORIDE SERPL-SCNC: 107 MMOL/L (ref 96–112)
CO2 SERPL-SCNC: 20 MMOL/L (ref 20–33)
CREAT SERPL-MCNC: 2.6 MG/DL (ref 0.5–1.4)
EOSINOPHIL # BLD AUTO: 0.31 K/UL (ref 0–0.51)
EOSINOPHIL NFR BLD: 4.6 % (ref 0–6.9)
ERYTHROCYTE [DISTWIDTH] IN BLOOD BY AUTOMATED COUNT: 44.9 FL (ref 35.9–50)
FERRITIN SERPL-MCNC: 77.4 NG/ML (ref 22–322)
GLUCOSE BLD-MCNC: 111 MG/DL (ref 65–99)
GLUCOSE SERPL-MCNC: 118 MG/DL (ref 65–99)
HCT VFR BLD AUTO: 34.1 % (ref 42–52)
HGB BLD-MCNC: 10.8 G/DL (ref 14–18)
HGB RETIC QN AUTO: 29.5 PG/CELL (ref 29–35)
IMM GRANULOCYTES # BLD AUTO: 0.05 K/UL (ref 0–0.11)
IMM GRANULOCYTES NFR BLD AUTO: 0.7 % (ref 0–0.9)
IMM RETICS NFR: 21.8 % (ref 9.3–17.4)
LYMPHOCYTES # BLD AUTO: 1.47 K/UL (ref 1–4.8)
LYMPHOCYTES NFR BLD: 22 % (ref 22–41)
MCH RBC QN AUTO: 26.2 PG (ref 27–33)
MCHC RBC AUTO-ENTMCNC: 31.7 G/DL (ref 33.7–35.3)
MCV RBC AUTO: 82.6 FL (ref 81.4–97.8)
MONOCYTES # BLD AUTO: 0.81 K/UL (ref 0–0.85)
MONOCYTES NFR BLD AUTO: 12.1 % (ref 0–13.4)
NEUTROPHILS # BLD AUTO: 3.99 K/UL (ref 1.82–7.42)
NEUTROPHILS NFR BLD: 60 % (ref 44–72)
NRBC # BLD AUTO: 0 K/UL
NRBC BLD-RTO: 0 /100 WBC
PLATELET # BLD AUTO: 155 K/UL (ref 164–446)
PMV BLD AUTO: 12.4 FL (ref 9–12.9)
POTASSIUM SERPL-SCNC: 4.3 MMOL/L (ref 3.6–5.5)
RBC # BLD AUTO: 4.13 M/UL (ref 4.7–6.1)
RETICS # AUTO: 0.06 M/UL (ref 0.04–0.06)
RETICS/RBC NFR: 1.5 % (ref 0.8–2.1)
SODIUM SERPL-SCNC: 135 MMOL/L (ref 135–145)
VIT B12 SERPL-MCNC: 312 PG/ML (ref 211–911)
WBC # BLD AUTO: 6.7 K/UL (ref 4.8–10.8)

## 2019-06-26 PROCEDURE — 36415 COLL VENOUS BLD VENIPUNCTURE: CPT

## 2019-06-26 PROCEDURE — 70450 CT HEAD/BRAIN W/O DYE: CPT

## 2019-06-26 PROCEDURE — 85025 COMPLETE CBC W/AUTO DIFF WBC: CPT

## 2019-06-26 PROCEDURE — 85046 RETICYTE/HGB CONCENTRATE: CPT

## 2019-06-26 PROCEDURE — 80048 BASIC METABOLIC PNL TOTAL CA: CPT

## 2019-06-26 PROCEDURE — A9270 NON-COVERED ITEM OR SERVICE: HCPCS | Performed by: HOSPITALIST

## 2019-06-26 PROCEDURE — 700102 HCHG RX REV CODE 250 W/ 637 OVERRIDE(OP): Performed by: INTERNAL MEDICINE

## 2019-06-26 PROCEDURE — 82607 VITAMIN B-12: CPT

## 2019-06-26 PROCEDURE — 700111 HCHG RX REV CODE 636 W/ 250 OVERRIDE (IP): Performed by: INTERNAL MEDICINE

## 2019-06-26 PROCEDURE — A9270 NON-COVERED ITEM OR SERVICE: HCPCS | Performed by: INTERNAL MEDICINE

## 2019-06-26 PROCEDURE — 82728 ASSAY OF FERRITIN: CPT

## 2019-06-26 PROCEDURE — 99233 SBSQ HOSP IP/OBS HIGH 50: CPT | Performed by: INTERNAL MEDICINE

## 2019-06-26 PROCEDURE — 82962 GLUCOSE BLOOD TEST: CPT

## 2019-06-26 PROCEDURE — 770020 HCHG ROOM/CARE - TELE (206)

## 2019-06-26 PROCEDURE — 700102 HCHG RX REV CODE 250 W/ 637 OVERRIDE(OP): Performed by: HOSPITALIST

## 2019-06-26 PROCEDURE — 76775 US EXAM ABDO BACK WALL LIM: CPT

## 2019-06-26 RX ORDER — AMLODIPINE BESYLATE 5 MG/1
10 TABLET ORAL DAILY
Status: DISCONTINUED | OUTPATIENT
Start: 2019-06-27 | End: 2019-07-03 | Stop reason: HOSPADM

## 2019-06-26 RX ORDER — HYDRALAZINE HYDROCHLORIDE 20 MG/ML
10 INJECTION INTRAMUSCULAR; INTRAVENOUS EVERY 6 HOURS PRN
Status: DISCONTINUED | OUTPATIENT
Start: 2019-06-26 | End: 2019-07-03 | Stop reason: HOSPADM

## 2019-06-26 RX ORDER — CARVEDILOL 6.25 MG/1
6.25 TABLET ORAL 2 TIMES DAILY WITH MEALS
Status: DISCONTINUED | OUTPATIENT
Start: 2019-06-26 | End: 2019-06-27

## 2019-06-26 RX ORDER — SODIUM CHLORIDE, SODIUM LACTATE, POTASSIUM CHLORIDE, CALCIUM CHLORIDE 600; 310; 30; 20 MG/100ML; MG/100ML; MG/100ML; MG/100ML
INJECTION, SOLUTION INTRAVENOUS CONTINUOUS
Status: DISCONTINUED | OUTPATIENT
Start: 2019-06-26 | End: 2019-06-27

## 2019-06-26 RX ORDER — ACETAMINOPHEN 325 MG/1
650 TABLET ORAL EVERY 4 HOURS PRN
Status: DISCONTINUED | OUTPATIENT
Start: 2019-06-26 | End: 2019-07-03 | Stop reason: HOSPADM

## 2019-06-26 RX ADMIN — ATORVASTATIN CALCIUM 80 MG: 80 TABLET, FILM COATED ORAL at 17:24

## 2019-06-26 RX ADMIN — ASPIRIN 325 MG: 325 TABLET, FILM COATED ORAL at 05:47

## 2019-06-26 RX ADMIN — HYDRALAZINE HYDROCHLORIDE 10 MG: 20 INJECTION INTRAMUSCULAR; INTRAVENOUS at 18:04

## 2019-06-26 RX ADMIN — HEPARIN SODIUM 5000 UNITS: 5000 INJECTION, SOLUTION INTRAVENOUS; SUBCUTANEOUS at 05:47

## 2019-06-26 RX ADMIN — CARVEDILOL 6.25 MG: 6.25 TABLET, FILM COATED ORAL at 18:04

## 2019-06-26 RX ADMIN — HEPARIN SODIUM 5000 UNITS: 5000 INJECTION, SOLUTION INTRAVENOUS; SUBCUTANEOUS at 21:29

## 2019-06-26 ASSESSMENT — ENCOUNTER SYMPTOMS
ABDOMINAL PAIN: 0
SHORTNESS OF BREATH: 0
MYALGIAS: 0
HEADACHES: 0
PALPITATIONS: 0
NAUSEA: 0
FOCAL WEAKNESS: 0

## 2019-06-26 NOTE — CARE PLAN
Problem: Safety  Goal: Will remain free from falls  Outcome: PROGRESSING AS EXPECTED  Patient free of falls and fall precautions in place.

## 2019-06-26 NOTE — PROGRESS NOTES
"Assumed pt care at 1900. Bedside report received from TIANA Cody. NIHSS done at bedside. Pt A&Ox4 and VELASQUEZ. Pt able to follow commands but requires frequent prompting. Medication regimen and POC discussed with pt. On RA, tele monitor in place. Bed locked and in lowest position. Bed alarm on. Call light within reach. No needs at this time. Pt continues to refuse MD Randall aware. Education provided.     At 2245, this RN found pt to be very lethargic and unable to follow commands. When attempted to be awakened, pt would mumble and groan. FSBS was 111 and VSS. On call hospitalist was paged and orders received for a stat head CT w/o contrast.     At 0130, pt was A&Ox4, VELASQUEZ, and able to follow commands. Pt states that he does not remember being woken up prior but does remember \"someone shining a light in my eyes.\"  "

## 2019-06-26 NOTE — ASSESSMENT & PLAN NOTE
"\"Cow\" valve per patient, placed by [facility]  Normal functioning bioprosthetic aortic valve on TTE  "

## 2019-06-26 NOTE — THERAPY
Speech Therapy Contact Note    Order for clinical swallow evaluation received. Spoke to RN who reports she will cancel it as it is not needed.

## 2019-06-26 NOTE — ASSESSMENT & PLAN NOTE
[facility] that [facility] has been greater than 2  UA showed protein  Renal US medical renal kidney disease  Avoid nephrotoxic medications, renally dose meds  Creatinine stable

## 2019-06-26 NOTE — PROGRESS NOTES
Monitor summary: SR 83-99, AL .18, QRS .10, QT .36, frequent PVCs with couplets per strip from monitor room.

## 2019-06-26 NOTE — DIETARY
Nutrition Services: Update   Day 2 of admit.  Brannon Silver is a 64 y.o. male with admitting DX of Seizure (HCC)    RD received consult for prediabetes education    During interview, pt denied family history of diabetes. Also denied any hx of prediabetes. RD verbally discussed general healthy diet and risk factors for developing diabetes. Pt disinterested in information provided and continued to ask inappropriate personal questions/ make remarks related to this provider despite multiple attempts to redirect conversation. Please re-consult for more education per pt interest and awareness of pre-diabetes as appropriate.     RD available PRN; Monitoring per department policy.

## 2019-06-26 NOTE — CARE PLAN
Problem: Communication  Goal: The ability to communicate needs accurately and effectively will improve  Outcome: PROGRESSING AS EXPECTED  Pt is A&Ox4 and is able to make needs known to healthcare staff. Pt does not call appropriately using the call light.     Problem: Safety  Goal: Will remain free from injury  Outcome: PROGRESSING AS EXPECTED  [place] fall precautions and seizure precautions in place.     Problem: Safety:  Goal: Will remain free from injury  Outcome: PROGRESSING AS EXPECTED  [place] fall precautions and seizure precautions in place.

## 2019-06-26 NOTE — PROGRESS NOTES
Hospital Medicine Daily Progress Note    Date of Service  6/26/2019    Chief Complaint  64 y.o. male admitted 6/24/2019 with right side weakness.    Hospital Course    PMH HTN, h/o bioprosthetic heart valve, brain tumor s/p resection who presented with acute right side weakness.  EEG did not show seizure.  MRI showed multiple infarcts.      Interval Problem Update  Sinus on tele  Creat remains elevated. Patient is refusing IVF, will encourage PO fluids. Check urine studies and renal US  TTE pending  He denies weakness, CP, dizziness, SOB. He says he feels at baseline.    Consultants/Specialty  Neurology    Code Status  Full    Disposition  PTOTST  Lives in [place] alone.  His family is in [place].    Review of Systems  Review of Systems   Constitutional: Negative for malaise/fatigue.   HENT: Negative for congestion.    Respiratory: Negative for shortness of breath.    Cardiovascular: Negative for chest pain and palpitations.   Gastrointestinal: Negative for abdominal pain and nausea.   Musculoskeletal: Negative for myalgias.   Neurological: Negative for focal weakness and headaches.        Physical Exam  Temp:  [36.2 °C (97.2 °F)-36.7 °C (98.1 °F)] 36.2 °C (97.2 °F)  Pulse:  [80-90] 89  Resp:  [16] 16  BP: (122-153)/() 138/92  SpO2:  [96 %-100 %] 96 %    Physical Exam   Constitutional: He is oriented to person, place, and time. He appears well-developed and well-nourished.   HENT:   Mouth/Throat: Oropharynx is clear and moist.   Well-healed surgical scars to scalp   Eyes: Pupils are equal, round, and reactive to light. Conjunctivae and EOM are normal.   Cardiovascular: Normal rate and regular rhythm.    Pulmonary/Chest: Effort normal. He has no wheezes. He has no rales.   Abdominal: Soft. There is no tenderness. There is no rebound and no guarding.   Musculoskeletal: He exhibits no edema.   Neurological: He is alert and oriented to person, place, and time.   Slow speech, possible cognitive delay  5 out of 5  strength upper extremities  5 out of 5 strength left lower extremity  5 out of 5 strength right lower extremity   Skin: Skin is warm and dry.   Nursing note and vitals reviewed.      Fluids    Intake/Output Summary (Last 24 hours) at 06/26/19 1108  Last data filed at 06/26/19 0400   Gross per 24 hour   Intake              860 ml   Output             1125 ml   Net             -265 ml       Laboratory  Recent Labs      06/24/19   1132  06/26/19   0331   WBC  5.9  6.7   RBC  4.15*  4.13*   HEMOGLOBIN  11.3*  10.8*   HEMATOCRIT  34.7*  34.1*   MCV  83.6  82.6   MCH  27.2  26.2*   MCHC  32.6*  31.7*   RDW  45.0  44.9   PLATELETCT  162*  155*   MPV  12.1  12.4     Recent Labs      06/24/19   1132  06/26/19   0331   SODIUM  140  135   POTASSIUM  4.3  4.3   CHLORIDE  113*  107   CO2  18*  20   GLUCOSE  140*  118*   BUN  40*  44*   CREATININE  2.45*  2.60*   CALCIUM  9.4  8.8     Recent Labs      06/24/19   1132   APTT  26.9   INR  1.02         Recent Labs      06/25/19   0449   TRIGLYCERIDE  79   HDL  42   LDL  66       Imaging  US-RENAL   Final Result      1.  No hydronephrosis   2.  Findings suggestive of chronic renal parenchymal disease   3.  Small BILATERAL renal cysts      CT-HEAD W/O   Final Result      No acute intracranial abnormality is identified.      Frontal encephalomalacia and advanced white matter disease is again demonstrated      Frontal cranioplasty      MR-BRAIN-W/O   Final Result      1.  Scattered punctate acute infarcts in the left frontal and parietal lobes.   2.  Small sized subacute to chronic right parieto-occipital infarct.   3.  Status post bifrontal cranioplasty.   4.  Right frontal lobe encephalomalacia which may be posttraumatic, ischemic or postsurgical in nature.   5.  Moderate diffuse cerebral substance loss.   6.  Advanced microangiopathic ischemic change versus demyelination or gliosis.   7.  Chronic ischemic pontine gliosis.   8.  Chronic bilateral cerebellar lacunar infarcts.   9.   Multiple focal areas of old hemorrhage in the bilateral cerebellar lobes consistent with chronic hypertensive microhemorrhage.      DX-CHEST-PORTABLE (1 VIEW)   Final Result      1.  There is no acute cardiopulmonary process.   2.  Trace of left CP angle pleural fluid/pleural thickening is unchanged.      CT-CTA HEAD WITH & W/O-POST PROCESS   Final Result      1.  There is complete occlusion of the mid left A2 segment with associated atherosclerosis. There is corresponds to the abnormality seen on the perfusion exam.      Findings were discussed with DINA AGOSTO on 6/24/2019 at 12:27 PM.      CT-CTA NECK WITH & W/O-POST PROCESSING   Final Result      1.  There is atherosclerosis in both carotid bulbs and internal carotid artery origins with estimated less than 50% stenosis bilaterally.   2.  There is no evidence of carotid artery occlusion.   3.  Vertebral arteries demonstrate no dissection or occlusion.   4.  Stable 5 mm right lung apical nodule since 7/5/2018.   5.  Slight asymmetry of soft tissues in the posterior oropharynx, left greater than right. This could be inflammatory or less likely neoplastic. Follow-up with ENT on a nonemergent basis is recommended.      CT-CEREBRAL PERFUSION ANALYSIS   Final Result      1.  Cerebral blood flow less than 30% likely representing completed infarct = 0 mL.      2.  T Max more than 6 seconds likely representing combination of completed infarct and ischemia = 40 mL.      3.  Mismatched volume likely representing ischemic brain/penumbra = 40 mL      4.  Please note that the cerebral perfusion was performed on the limited brain tissue around the basal ganglia region. Infarct/ischemia outside the CT perfusion sections can be missed in this study.      CT-HEAD W/O   Final Result      1.  Cerebral atrophy.      2.  White matter lucencies most consistent with small vessel ischemic change versus demyelination or gliosis.      3.  Otherwise, Head CT without contrast with no  "acute findings. No evidence of acute cerebral infarction, hemorrhage or mass lesion.      EC-ECHOCARDIOGRAM COMPLETE W/O CONT    (Results Pending)        Assessment/Plan  * CVA (cerebral vascular accident) (HCC)- (present on admission)   Assessment & Plan    MRI brain showed embolic strokes  Started on aspirin, statin, dvt prophylaxis.  LDL 66  A1c 6.1%, prediabetes education ordered  TTE pending  Placed on telemetry to monitor for arrhythmias.  Permissive HTN  PT/OT/ST consulted. Stroke education ordered.  Will need 30-day cardiac event monitoring prior to discharge if workup unremarkable       Acute renal failure superimposed on stage 3 chronic kidney disease (HCC)   Assessment & Plan    No recent renal function tests, last tests were 1 year ago  Possible FARZAD vs progression of CKD  UA showed protein  Renal US ordered  Urine studies ordered  Patient is refusing IVF, will encourage PO fluids.  Avoid nephrotoxic medications, renally dose meds     Anemia   Assessment & Plan    Vit B12 and ferritin normal  Retic is low  Outpatient followup     H/O aortic valve replacement   Assessment & Plan    \"Cow\" valve per patient, placed by [facility]  TTE pending     Brain tumor (HCC)   Assessment & Plan    History of resection many years ago  Has some cognitive delay       History of seizure- (present on admission)   Assessment & Plan    EEG negative for seizure     Essential hypertension- (present on admission)   Assessment & Plan    Permissive HTN as per protocol     Cardiomyopathy (HCC)- (present on admission)   Assessment & Plan    TTE pending  Holding Coreg for permissive hypertension          VTE prophylaxis: heparin      "

## 2019-06-26 NOTE — PROGRESS NOTES
Monitor summary: SR/ST , NH 0.18, QRS 0.10, QT 0.38, with rare PVCs and  per strip from monitor room.

## 2019-06-26 NOTE — PROGRESS NOTES
Monitor room called to say patient had a 1.4 second pause, and patient refusing LR PIV. MD made aware and instructed this RN to give the patient plenty of drinking water.

## 2019-06-27 ENCOUNTER — APPOINTMENT (OUTPATIENT)
Dept: CARDIOLOGY | Facility: MEDICAL CENTER | Age: 64
DRG: 065 | End: 2019-06-27
Attending: HOSPITALIST
Payer: COMMERCIAL

## 2019-06-27 ENCOUNTER — HOSPITAL ENCOUNTER (INPATIENT)
Facility: REHABILITATION | Age: 64
End: 2019-06-27
Attending: PHYSICAL MEDICINE & REHABILITATION | Admitting: PHYSICAL MEDICINE & REHABILITATION
Payer: MEDICAID

## 2019-06-27 PROBLEM — N18.4 STAGE 4 CHRONIC KIDNEY DISEASE (HCC): Status: ACTIVE | Noted: 2019-06-25

## 2019-06-27 LAB
ANION GAP SERPL CALC-SCNC: 6 MMOL/L (ref 0–11.9)
BASOPHILS # BLD AUTO: 0.6 % (ref 0–1.8)
BASOPHILS # BLD: 0.04 K/UL (ref 0–0.12)
BUN SERPL-MCNC: 48 MG/DL (ref 8–22)
CALCIUM SERPL-MCNC: 9.2 MG/DL (ref 8.5–10.5)
CHLORIDE SERPL-SCNC: 111 MMOL/L (ref 96–112)
CO2 SERPL-SCNC: 22 MMOL/L (ref 20–33)
CREAT SERPL-MCNC: 2.41 MG/DL (ref 0.5–1.4)
EOSINOPHIL # BLD AUTO: 0.22 K/UL (ref 0–0.51)
EOSINOPHIL NFR BLD: 3.1 % (ref 0–6.9)
ERYTHROCYTE [DISTWIDTH] IN BLOOD BY AUTOMATED COUNT: 45.9 FL (ref 35.9–50)
GLUCOSE SERPL-MCNC: 94 MG/DL (ref 65–99)
HCT VFR BLD AUTO: 36.2 % (ref 42–52)
HGB BLD-MCNC: 11.9 G/DL (ref 14–18)
IMM GRANULOCYTES # BLD AUTO: 0.05 K/UL (ref 0–0.11)
IMM GRANULOCYTES NFR BLD AUTO: 0.7 % (ref 0–0.9)
LV EJECT FRACT  99904: 55
LV EJECT FRACT MOD 2C 99903: 21.52
LV EJECT FRACT MOD 4C 99902: 55.51
LV EJECT FRACT MOD BP 99901: 42.47
LYMPHOCYTES # BLD AUTO: 0.94 K/UL (ref 1–4.8)
LYMPHOCYTES NFR BLD: 13.4 % (ref 22–41)
MCH RBC QN AUTO: 27.5 PG (ref 27–33)
MCHC RBC AUTO-ENTMCNC: 32.9 G/DL (ref 33.7–35.3)
MCV RBC AUTO: 83.8 FL (ref 81.4–97.8)
MONOCYTES # BLD AUTO: 0.69 K/UL (ref 0–0.85)
MONOCYTES NFR BLD AUTO: 9.8 % (ref 0–13.4)
NEUTROPHILS # BLD AUTO: 5.09 K/UL (ref 1.82–7.42)
NEUTROPHILS NFR BLD: 72.4 % (ref 44–72)
NRBC # BLD AUTO: 0 K/UL
NRBC BLD-RTO: 0 /100 WBC
PLATELET # BLD AUTO: 175 K/UL (ref 164–446)
PMV BLD AUTO: 12.1 FL (ref 9–12.9)
POTASSIUM SERPL-SCNC: 4.7 MMOL/L (ref 3.6–5.5)
RBC # BLD AUTO: 4.32 M/UL (ref 4.7–6.1)
SODIUM SERPL-SCNC: 139 MMOL/L (ref 135–145)
TSH SERPL DL<=0.005 MIU/L-ACNC: 0.97 UIU/ML (ref 0.38–5.33)
WBC # BLD AUTO: 7 K/UL (ref 4.8–10.8)

## 2019-06-27 PROCEDURE — A9270 NON-COVERED ITEM OR SERVICE: HCPCS | Performed by: HOSPITALIST

## 2019-06-27 PROCEDURE — 700102 HCHG RX REV CODE 250 W/ 637 OVERRIDE(OP): Performed by: INTERNAL MEDICINE

## 2019-06-27 PROCEDURE — 80048 BASIC METABOLIC PNL TOTAL CA: CPT

## 2019-06-27 PROCEDURE — A9270 NON-COVERED ITEM OR SERVICE: HCPCS | Performed by: INTERNAL MEDICINE

## 2019-06-27 PROCEDURE — 85025 COMPLETE CBC W/AUTO DIFF WBC: CPT

## 2019-06-27 PROCEDURE — 770020 HCHG ROOM/CARE - TELE (206)

## 2019-06-27 PROCEDURE — 93306 TTE W/DOPPLER COMPLETE: CPT | Mod: 26 | Performed by: INTERNAL MEDICINE

## 2019-06-27 PROCEDURE — 99232 SBSQ HOSP IP/OBS MODERATE 35: CPT | Performed by: INTERNAL MEDICINE

## 2019-06-27 PROCEDURE — 36415 COLL VENOUS BLD VENIPUNCTURE: CPT

## 2019-06-27 PROCEDURE — 700102 HCHG RX REV CODE 250 W/ 637 OVERRIDE(OP): Performed by: HOSPITALIST

## 2019-06-27 PROCEDURE — 84443 ASSAY THYROID STIM HORMONE: CPT

## 2019-06-27 PROCEDURE — 93306 TTE W/DOPPLER COMPLETE: CPT

## 2019-06-27 RX ORDER — ASPIRIN 81 MG/1
81 TABLET, CHEWABLE ORAL DAILY
Qty: 100 TAB | Refills: 0 | Status: SHIPPED | OUTPATIENT
Start: 2019-06-27 | End: 2023-10-11

## 2019-06-27 RX ORDER — CARVEDILOL 6.25 MG/1
12.5 TABLET ORAL 2 TIMES DAILY WITH MEALS
Status: DISCONTINUED | OUTPATIENT
Start: 2019-06-27 | End: 2019-06-29

## 2019-06-27 RX ORDER — ATORVASTATIN CALCIUM 80 MG/1
80 TABLET, FILM COATED ORAL EVERY EVENING
Qty: 30 TAB | Refills: 0 | Status: ON HOLD | OUTPATIENT
Start: 2019-06-27 | End: 2023-10-18 | Stop reason: SDUPTHER

## 2019-06-27 RX ADMIN — ATORVASTATIN CALCIUM 80 MG: 80 TABLET, FILM COATED ORAL at 18:22

## 2019-06-27 RX ADMIN — CARVEDILOL 6.25 MG: 6.25 TABLET, FILM COATED ORAL at 09:07

## 2019-06-27 RX ADMIN — CARVEDILOL 12.5 MG: 6.25 TABLET, FILM COATED ORAL at 18:22

## 2019-06-27 RX ADMIN — AMLODIPINE BESYLATE 10 MG: 5 TABLET ORAL at 04:20

## 2019-06-27 RX ADMIN — ASPIRIN 325 MG: 325 TABLET, FILM COATED ORAL at 04:20

## 2019-06-27 ASSESSMENT — ENCOUNTER SYMPTOMS
ABDOMINAL PAIN: 0
SHORTNESS OF BREATH: 0
NAUSEA: 0
MYALGIAS: 0
HEADACHES: 0
FOCAL WEAKNESS: 0

## 2019-06-27 NOTE — DISCHARGE PLANNING
Attempted to call Radha, Daughter regarding D/C resources/support.  Phone number listed is incorrect.  Msg left for Elisa MONTERROSO) for further assistance.

## 2019-06-27 NOTE — CONSULTS
Medical chart review completed.     Patient is a 64 y.o. Right hand dominant male  with a past medical history of hypertension, brain tumor, previous strokes, and possible seizure disorder, admitted to [facility] on 6/24, with right sided weakness and speech difficulties, [place] bus stop. NIHSS 2. Head CT negative, perfusion scan with mismatch, CTA with complete occlusion of left A2, <50% stenosis of bilateral ICAs, incidental findings of right lung apical nodule (stable since 7/5/2018) and asymmetry of posterior oropharynx, inflammatory versus neoplastic, outpatient follow up with ENT recommended. MRI with:    1.  Scattered punctate acute infarcts in the left frontal and parietal lobes.  2.  Small sized subacute to chronic right parieto-occipital infarct.  3.  Status post bifrontal cranioplasty.  4.  Right frontal lobe encephalomalacia which may be posttraumatic, ischemic or postsurgical in nature.  5.  Moderate diffuse cerebral substance loss.  6.  Advanced microangiopathic ischemic change versus demyelination or gliosis.  7.  Chronic ischemic pontine gliosis.  8.  Chronic bilateral cerebellar lacunar infarcts.  9.  Multiple focal areas of old hemorrhage in the bilateral cerebellar lobes consistent with chronic hypertensive microhemorrhage.    Patient had EEG which was negative. HgbA1c 6.1, LDL 66, ECHO pending. CXR with stable/unchanged left pleural thickening/fluid. Renal US with evidence of chronic renal parenchymal disease.     Patient with multiple co-morbidities(including but not limited to anemia, acute versus chronic kidney disease); with cognitive deficits and functional deficits in mobility/self-cares, and Moderate de-conditioning.     Pre-morbidly, this patient lived in a single level apartment on the second floor without an elevator, alone and able to care for himself. The patient was evaluated by acute care Physical Therapy, Occupational Therapy and Speech Language  Pathology; currently requiring minimal assistance for mobility and minimal to maximum assistance for ADLs, also with ongoing cognitive deficits.     6 clicks score 19 mobility, 15 ADLs    If patient has discharge support (poor insight into deficits, may need supervision at discharge), then he would be a fair candidate for an acute inpatient rehabilitation program with a coordinated program of care at an intensity and frequency not available at a lower level of care.     If he does not have support, recommend discharge to SNF for rehabilitation.     Note: if the patient continues to progress while waiting for medical clearance, and no longer requires 2 out of 3 therapy services (PT/OT/SLP), then the patient would no longer meet the criteria for acute inpatient rehabilitation.    This recommendation is substantiated by the patient's current medical condition with intervention and assessment of medical issues requiring an acute level of care for patient's safety and maximum outcome. A coordinated program of care will be provided by an interdisciplinary team including physical therapy, occupational therapy, speech language pathology, physiatry, rehab nursing and rehab psychology. Rehab goals include improved cognition, mobility, self-care management, strength and conditioning/endurance, pain management, bowel and bladder management, mood and affect, and safety with independent home management including caregiver training. Estimated length of stay is approximately 10-14 days. Rehab potential: Fair. Disposition: to pre-morbid independent living setting with supportive care of patient's community resources. We will continue to follow with you in anticipation of discharge to acute inpatient rehabilitation when medically stable to do so at the discretion of him attending physician.     Thank you for allowing us to participate in him care.    Felisha Hobbs M.D.  Physical Medicine and Rehabilitation

## 2019-06-27 NOTE — PROGRESS NOTES
Patient refused AM labs, pt agreeable to being drawn no earlier than after breakfast, educated regarding importance.  Education reinforced by Jack WASHINGTON RN.  Patient continues to refuse.

## 2019-06-27 NOTE — PROGRESS NOTES
Monitor summary: SR to ST , DE .16, QRS .08, QT 36, SR to ST with rare PVCs and PVC couplet and 1.4 sec pause per strip from monitor room.

## 2019-06-27 NOTE — PROGRESS NOTES
Assumed pt care at 1900. Received bedside report .    Pt Alert and oriented x  4. Pt denies, chest pain, sob, nausea/vomiting, headache, blurry/double vision. Pt denies  numbness/tingling. Pt denies  pain. Pt ambulating with standby assist, walker. POC discussed and education provided on administered medications. All questions and concerns addressed. Fall precautions seizure precautions, hourly rounding and Q4 hour neuro checks in place.

## 2019-06-27 NOTE — CARE PLAN
Problem: Communication  Goal: The ability to communicate needs accurately and effectively will improve  Outcome: PROGRESSING AS EXPECTED  Encourage use of the call light, encourage pt to voice feelings, assess pt needs hourly including pain and toileting, provide interpretative services as needed.       Problem: Safety  Goal: Will remain free from injury  Outcome: PROGRESSING AS EXPECTED  Pt educated on ambulation and fall risks, collaboration with PT/OT, all ambulation devices out of sight while not in use, proper signage hung, fall precautions in place.       Problem: Safety:  Goal: Will remain free from injury  Outcome: PROGRESSING AS EXPECTED  Pt educated on ambulation and fall risks, collaboration with PT/OT, all ambulation devices out of sight while not in use, proper signage hung, fall precautions in place.

## 2019-06-27 NOTE — PROGRESS NOTES
Patient with a BP of 189/102, RN paged MD and awaiting return call for orders as patient is beyond the permissive hypertension 48 hour window.

## 2019-06-27 NOTE — PROGRESS NOTES
Monitor summary: SR , IL 0.16, QRS 0.08, QT 0.34, with rare PVCs andper strip from monitor room.

## 2019-06-27 NOTE — DISCHARGE PLANNING
Current recommendation is for SNF d/t lacking D/C resources/support.  Elisa MONTERROSO) is aware.

## 2019-06-27 NOTE — PROGRESS NOTES
[facility] Medicine Daily Progress Note    Date of Service  6/27/2019    Chief Complaint  64 y.o. male admitted 6/24/2019 with right side weakness.    Hospital Course    PMH HTN, h/o bioprosthetic heart valve, brain tumor s/p resection who presented with acute right side weakness.  EEG did not show seizure.  MRI showed multiple infarcts.  Neurology was consulted.  He was monitored on telemetry showed sinus rhythm.  TTE showed EF 55%, moderate LVH, negative bubble study, normally functioning bioprosthetic aortic valve.      Interval Problem Update  Sinus on telemetry, mild tachycardia.  Coreg increased.  TTE done  Patient has no complaints, says he feels well.    Consultants/Specialty  Neurology    Code Status  Full    Disposition  Declined by acute rehab  SNF  Lives in [facility].  His family is in [place].    Review of Systems  Review of Systems   Constitutional: Negative for malaise/fatigue.   Respiratory: Negative for shortness of breath.    Cardiovascular: Negative for chest pain.   Gastrointestinal: Negative for abdominal pain and nausea.   Musculoskeletal: Negative for myalgias.   Neurological: Negative for focal weakness and headaches.        Physical Exam  Temp:  [36.3 °C (97.3 °F)-36.7 °C (98.1 °F)] 36.5 °C (97.7 °F)  Pulse:  [] 103  Resp:  [16-18] 18  BP: (115-189)/() 140/100  SpO2:  [97 %-100 %] 97 %    Physical Exam   Constitutional: He is oriented to person, place, and time. He appears well-developed and well-nourished.   HENT:   Mouth/Throat: Oropharynx is clear and moist.   Well-healed surgical scars to scalp   Eyes: Pupils are equal, round, and reactive to light. Conjunctivae and EOM are normal.   Cardiovascular: Normal rate and regular rhythm.    Pulmonary/Chest: Effort normal. He has no wheezes. He has no rales.   Abdominal: Soft. There is no tenderness.   Musculoskeletal: He exhibits no edema.   Neurological: He is alert and oriented to person, place, and time.   Slow speech  5 out of 5  strength upper extremities  5 out of 5 strength left lower extremity  4-5 out of 5 strength right lower extremity   Skin: Skin is warm and dry.   Nursing note and vitals reviewed.      Fluids    Intake/Output Summary (Last 24 hours) at 06/27/19 1419  Last data filed at 06/27/19 1215   Gross per 24 hour   Intake             1760 ml   Output             2850 ml   Net            -1090 ml       Laboratory  Recent Labs      06/26/19   0331  06/27/19   1115   WBC  6.7  7.0   RBC  4.13*  4.32*   HEMOGLOBIN  10.8*  11.9*   HEMATOCRIT  34.1*  36.2*   MCV  82.6  83.8   MCH  26.2*  27.5   MCHC  31.7*  32.9*   RDW  44.9  45.9   PLATELETCT  155*  175   MPV  12.4  12.1     Recent Labs      06/26/19   0331  06/27/19   1115   SODIUM  135  139   POTASSIUM  4.3  4.7   CHLORIDE  107  111   CO2  20  22   GLUCOSE  118*  94   BUN  44*  48*   CREATININE  2.60*  2.41*   CALCIUM  8.8  9.2             Recent Labs      06/25/19   0449   TRIGLYCERIDE  79   HDL  42   LDL  66       Imaging  EC-ECHOCARDIOGRAM COMPLETE W/O CONT   Final Result      US-RENAL   Final Result      1.  No hydronephrosis   2.  Findings suggestive of chronic renal parenchymal disease   3.  Small BILATERAL renal cysts      CT-HEAD W/O   Final Result      No acute intracranial abnormality is identified.      Frontal encephalomalacia and advanced white matter disease is again demonstrated      Frontal cranioplasty      MR-BRAIN-W/O   Final Result      1.  Scattered punctate acute infarcts in the left frontal and parietal lobes.   2.  Small sized subacute to chronic right parieto-occipital infarct.   3.  Status post bifrontal cranioplasty.   4.  Right frontal lobe encephalomalacia which may be posttraumatic, ischemic or postsurgical in nature.   5.  Moderate diffuse cerebral substance loss.   6.  Advanced microangiopathic ischemic change versus demyelination or gliosis.   7.  Chronic ischemic pontine gliosis.   8.  Chronic bilateral cerebellar lacunar infarcts.   9.  Multiple  focal areas of old hemorrhage in the bilateral cerebellar lobes consistent with chronic hypertensive microhemorrhage.      DX-CHEST-PORTABLE (1 VIEW)   Final Result      1.  There is no acute cardiopulmonary process.   2.  Trace of left CP angle pleural fluid/pleural thickening is unchanged.      CT-CTA HEAD WITH & W/O-POST PROCESS   Final Result      1.  There is complete occlusion of the mid left A2 segment with associated atherosclerosis. There is corresponds to the abnormality seen on the perfusion exam.      Findings were discussed with DINA AGOSTO on 6/24/2019 at 12:27 PM.      CT-CTA NECK WITH & W/O-POST PROCESSING   Final Result      1.  There is atherosclerosis in both carotid bulbs and internal carotid artery origins with estimated less than 50% stenosis bilaterally.   2.  There is no evidence of carotid artery occlusion.   3.  Vertebral arteries demonstrate no dissection or occlusion.   4.  Stable 5 mm right lung apical nodule since 7/5/2018.   5.  Slight asymmetry of soft tissues in the posterior oropharynx, left greater than right. This could be inflammatory or less likely neoplastic. Follow-up with ENT on a nonemergent basis is recommended.      CT-CEREBRAL PERFUSION ANALYSIS   Final Result      1.  Cerebral blood flow less than 30% likely representing completed infarct = 0 mL.      2.  T Max more than 6 seconds likely representing combination of completed infarct and ischemia = 40 mL.      3.  Mismatched volume likely representing ischemic brain/penumbra = 40 mL      4.  Please note that the cerebral perfusion was performed on the limited brain tissue around the basal ganglia region. Infarct/ischemia outside the CT perfusion sections can be missed in this study.      CT-HEAD W/O   Final Result      1.  Cerebral atrophy.      2.  White matter lucencies most consistent with small vessel ischemic change versus demyelination or gliosis.      3.  Otherwise, Head CT without contrast with no acute  "findings. No evidence of acute cerebral infarction, hemorrhage or mass lesion.           Assessment/Plan  * CVA (cerebral vascular accident) (HCC)- (present on admission)   Assessment & Plan    MRI brain showed embolic strokes  Started on aspirin, statin, dvt prophylaxis.  LDL 66  A1c 6.1%, prediabetes education ordered  TTE done, negative bubble study, no thrombus seen  Placed on telemetry to monitor for arrhythmias.  Permissive HTN  PT/OT/ST consulted. Stroke education ordered.  Will need 30-day cardiac event [facility]       Stage 4 chronic kidney disease (HCC)   Assessment & Plan    [facility] shows that his creatinine has been greater than 2  UA showed protein  Renal US medical renal kidney disease  Avoid nephrotoxic medications, renally dose meds     Anemia- (present on admission)   Assessment & Plan    Vit B12 and ferritin normal  Retic is low  Outpatient followup     H/O aortic valve replacement- (present on admission)   Assessment & Plan    \"Cow\" valve per patient, placed by [facility]  Normal functioning bioprosthetic aortic valve on TTE     Brain tumor (HCC)- (present on admission)   Assessment & Plan    History of resection many years ago  Has some cognitive delay       History of seizure- (present on admission)   Assessment & Plan    EEG negative for seizure     Essential hypertension- (present on admission)   Assessment & Plan    Restarted home Coreg, Norvasc     Cardiomyopathy (HCC)- (present on admission)   Assessment & Plan    TTE here showed EF 55%  Continue on his home Coreg, increased dose for tachycardia          VTE prophylaxis: heparin      "

## 2019-06-28 PROCEDURE — 700102 HCHG RX REV CODE 250 W/ 637 OVERRIDE(OP): Performed by: HOSPITALIST

## 2019-06-28 PROCEDURE — 770020 HCHG ROOM/CARE - TELE (206)

## 2019-06-28 PROCEDURE — A9270 NON-COVERED ITEM OR SERVICE: HCPCS | Performed by: INTERNAL MEDICINE

## 2019-06-28 PROCEDURE — 97530 THERAPEUTIC ACTIVITIES: CPT

## 2019-06-28 PROCEDURE — 97116 GAIT TRAINING THERAPY: CPT

## 2019-06-28 PROCEDURE — 700111 HCHG RX REV CODE 636 W/ 250 OVERRIDE (IP): Performed by: INTERNAL MEDICINE

## 2019-06-28 PROCEDURE — 700102 HCHG RX REV CODE 250 W/ 637 OVERRIDE(OP): Performed by: INTERNAL MEDICINE

## 2019-06-28 PROCEDURE — 99232 SBSQ HOSP IP/OBS MODERATE 35: CPT | Performed by: INTERNAL MEDICINE

## 2019-06-28 PROCEDURE — A9270 NON-COVERED ITEM OR SERVICE: HCPCS | Performed by: HOSPITALIST

## 2019-06-28 RX ADMIN — ATORVASTATIN CALCIUM 80 MG: 80 TABLET, FILM COATED ORAL at 18:49

## 2019-06-28 RX ADMIN — CARVEDILOL 12.5 MG: 6.25 TABLET, FILM COATED ORAL at 07:59

## 2019-06-28 RX ADMIN — HEPARIN SODIUM 5000 UNITS: 5000 INJECTION, SOLUTION INTRAVENOUS; SUBCUTANEOUS at 04:27

## 2019-06-28 RX ADMIN — CARVEDILOL 12.5 MG: 6.25 TABLET, FILM COATED ORAL at 18:49

## 2019-06-28 RX ADMIN — AMLODIPINE BESYLATE 10 MG: 5 TABLET ORAL at 04:16

## 2019-06-28 RX ADMIN — ASPIRIN 325 MG: 325 TABLET, FILM COATED ORAL at 04:16

## 2019-06-28 ASSESSMENT — COGNITIVE AND FUNCTIONAL STATUS - GENERAL
CLIMB 3 TO 5 STEPS WITH RAILING: A LITTLE
MOVING FROM LYING ON BACK TO SITTING ON SIDE OF FLAT BED: A LOT
MOBILITY SCORE: 19
WALKING IN HOSPITAL ROOM: A LITTLE
STANDING UP FROM CHAIR USING ARMS: A LITTLE
SUGGESTED CMS G CODE MODIFIER MOBILITY: CK

## 2019-06-28 ASSESSMENT — ENCOUNTER SYMPTOMS
SHORTNESS OF BREATH: 0
ABDOMINAL PAIN: 0
MYALGIAS: 0
HEADACHES: 0
DIZZINESS: 0
FOCAL WEAKNESS: 0

## 2019-06-28 ASSESSMENT — GAIT ASSESSMENTS
GAIT LEVEL OF ASSIST: MINIMAL ASSIST
DISTANCE (FEET): 200
ASSISTIVE DEVICE: FRONT WHEEL WALKER
DEVIATION: OTHER (COMMENT)

## 2019-06-28 NOTE — PROGRESS NOTES
Patient refused Heparin injection, educated regarding importance.  Education reinforced by Jack WASHINGTON RN.  Patient continues to refuse.

## 2019-06-28 NOTE — PROGRESS NOTES
Assumed pt care at 1900. Received bedside report .    Pt Alert and oriented x  4. Pt denies, chest pain, sob, nausea/vomiting, headache, blurry/double vision. Pt denies numbness/tingling. Pt denies pain. Pt ambulating with 1 assit and FWW. POC discussed and education provided on administered medications. All questions and concerns addressed. Fall precautions seizure precautions, hourly rounding and Q4 hour neuro checks in place.

## 2019-06-28 NOTE — DISCHARGE PLANNING
Anticipated Discharge Disposition:   SNF    Action:    Spoke with patient.  He was sitting edge of bed.  He stated he was living in an apartment on 2nd level.  He was independent with ADLs and IADLs.  He stated his income is around $1200/month.  He advised RN CM to speak with his [facility] , Yoli about finances and his apartment.    TIANA NATH spoke with Yoli.  She stated that the ground level apartment will be ready in August.  Patient does not have any family in the area and has a friend in [place].  Pts income is $1127/month.  She will work on getting patient applied for Medicare.    TIANA NATH left  for [facility] with [facility] to discuss SNF & CLC with request to return call.    Barriers to Discharge:    Payor source for SNF  Afterplan to SNF    Plan:    F/U with [facility] with [facility].  Request PFA to screen for Medicaid.    Care Transition Team Assessment    Information Source  Orientation : Oriented x 4  Information Given By: Patient, Other (Comments)  Informant's Name: Brannon Silver & Yoli NATH with [facility]  Who is responsible for making decisions for patient? : Patient    Readmission Evaluation  Is this a readmission?: No    Elopement Risk  Legal Hold: No  Ambulatory or Self Mobile in Wheelchair: Yes  Disoriented: No  Psychiatric Symptoms: None  History of Wandering: No  Elopement this Admit: No  Vocalizing Wanting to Leave: No  Displays Behaviors, Body Language Wanting to Leave: No-Not at Risk for Elopement  Elopement Risk: Not at Risk for Elopement    Interdisciplinary Discharge Planning  Lives with - Patient's Self Care Capacity: Alone and Able to Care For Self  Housing / Facility: 2 Story Apartment / Condo  Prior Services: None    Discharge Preparedness  What is your plan after discharge?: Skilled nursing facility  What are your discharge supports?: Other (comment)  Prior Functional Level: Ambulatory, Independent with Activities of Daily Living, Independent with Medication Management  Difficulity with ADLs:  Walking, Toileting, Bathing  Difficulity with IADLs: Cooking, Driving, Laundry, Shopping    Functional Assesment  Prior Functional Level: Ambulatory, Independent with Activities of Daily Living, Independent with Medication Management    Finances  Financial Barriers to Discharge: No  Prescription Coverage: Yes              Advance Directive  Advance Directive?: None    Domestic Abuse  Have you ever been the victim of abuse or violence?: No         Discharge Risks or Barriers  Discharge risks or barriers?: Uninsured / underinsured    Anticipated Discharge Information  Anticipated discharge disposition: SNF  Discharge Contact Phone Number: [phone]

## 2019-06-28 NOTE — PROGRESS NOTES
Monitor summary: SR to ST , CO .16, QRS .08, QT .36, with occasional multifocal PVCs, rare PVCs  and couplet PVC per strip from monitor room.

## 2019-06-28 NOTE — PROGRESS NOTES
Monitor summary: SR 93-98-, MT 0.14, QRS 0.10, QT 0.32,  With rare  PVCs and polymorphic triplets per strip from monitor room.

## 2019-06-28 NOTE — DISCHARGE PLANNING
[facility]  Outcome: [facility] to [facility] the [facility] per Elisa's(TIANA CM) request.

## 2019-06-28 NOTE — PROGRESS NOTES
Hospital Medicine Daily Progress Note    Date of Service  6/28/2019    Chief Complaint  64 y.o. male admitted 6/24/2019 with right side weakness.    Hospital Course    PMH HTN, h/o bioprosthetic heart valve, brain tumor s/p resection who presented with acute right side weakness.  EEG did not show seizure.  MRI showed multiple infarcts.  Neurology was consulted.  He was monitored on telemetry showed sinus rhythm.  TTE showed EF 55%, moderate LVH, negative bubble study, normally functioning bioprosthetic aortic valve. PTOT recommended inpatient rehab. He will need f/u outpatient for 30-day cardiac monitor.      Interval Problem Update  No acute events, patient has no complaints.  Sinus on tele    Consultants/Specialty  Neurology    Code Status  Full    Disposition  Declined by acute rehab  [facility], [facility]  Lives in [facility].  His family is in [place].    Review of Systems  Review of Systems   Constitutional: Negative for malaise/fatigue.   Respiratory: Negative for shortness of breath.    Cardiovascular: Negative for chest pain.   Gastrointestinal: Negative for abdominal pain.   Musculoskeletal: Negative for myalgias.   Neurological: Negative for dizziness, focal weakness and headaches.        Physical Exam  Temp:  [36.4 °C (97.5 °F)-36.7 °C (98.1 °F)] 36.4 °C (97.5 °F)  Pulse:  [] 70  Resp:  [16-20] 16  BP: (120-167)/() 150/92  SpO2:  [96 %-100 %] 96 %    Physical Exam   Constitutional: He is oriented to person, place, and time. He appears well-developed and well-nourished.   HENT:   Mouth/Throat: Oropharynx is clear and moist.   Well-healed surgical scars to scalp   Eyes: Pupils are equal, round, and reactive to light. Conjunctivae and EOM are normal.   Cardiovascular: Normal rate and regular rhythm.    Pulmonary/Chest: Effort normal. He has no wheezes.   Abdominal: Soft. There is no tenderness.   Musculoskeletal: He exhibits no edema.   Neurological: He is alert and  oriented to person, place, and time.   Slow speech  5 out of 5 strength upper extremities  5 out of 5 strength left lower extremity  4-5 out of 5 strength right lower extremity   Skin: Skin is warm and dry.   Nursing note and vitals reviewed.      Fluids    Intake/Output Summary (Last 24 hours) at 06/28/19 1354  Last data filed at 06/28/19 1050   Gross per 24 hour   Intake             1740 ml   Output             2250 ml   Net             -510 ml       Laboratory  Recent Labs      06/26/19   0331  06/27/19   1115   WBC  6.7  7.0   RBC  4.13*  4.32*   HEMOGLOBIN  10.8*  11.9*   HEMATOCRIT  34.1*  36.2*   MCV  82.6  83.8   MCH  26.2*  27.5   MCHC  31.7*  32.9*   RDW  44.9  45.9   PLATELETCT  155*  175   MPV  12.4  12.1     Recent Labs      06/26/19   0331  06/27/19   1115   SODIUM  135  139   POTASSIUM  4.3  4.7   CHLORIDE  107  111   CO2  20  22   GLUCOSE  118*  94   BUN  44*  48*   CREATININE  2.60*  2.41*   CALCIUM  8.8  9.2                   Imaging  EC-ECHOCARDIOGRAM COMPLETE W/O CONT   Final Result      US-RENAL   Final Result      1.  No hydronephrosis   2.  Findings suggestive of chronic renal parenchymal disease   3.  Small BILATERAL renal cysts      CT-HEAD W/O   Final Result      No acute intracranial abnormality is identified.      Frontal encephalomalacia and advanced white matter disease is again demonstrated      Frontal cranioplasty      MR-BRAIN-W/O   Final Result      1.  Scattered punctate acute infarcts in the left frontal and parietal lobes.   2.  Small sized subacute to chronic right parieto-occipital infarct.   3.  Status post bifrontal cranioplasty.   4.  Right frontal lobe encephalomalacia which may be posttraumatic, ischemic or postsurgical in nature.   5.  Moderate diffuse cerebral substance loss.   6.  Advanced microangiopathic ischemic change versus demyelination or gliosis.   7.  Chronic ischemic pontine gliosis.   8.  Chronic bilateral cerebellar lacunar infarcts.   9.  Multiple focal  areas of old hemorrhage in the bilateral cerebellar lobes consistent with chronic hypertensive microhemorrhage.      DX-CHEST-PORTABLE (1 VIEW)   Final Result      1.  There is no acute cardiopulmonary process.   2.  Trace of left CP angle pleural fluid/pleural thickening is unchanged.      CT-CTA HEAD WITH & W/O-POST PROCESS   Final Result      1.  There is complete occlusion of the mid left A2 segment with associated atherosclerosis. There is corresponds to the abnormality seen on the perfusion exam.      Findings were discussed with DINA AGOSTO on 6/24/2019 at 12:27 PM.      CT-CTA NECK WITH & W/O-POST PROCESSING   Final Result      1.  There is atherosclerosis in both carotid bulbs and internal carotid artery origins with estimated less than 50% stenosis bilaterally.   2.  There is no evidence of carotid artery occlusion.   3.  Vertebral arteries demonstrate no dissection or occlusion.   4.  Stable 5 mm right lung apical nodule since 7/5/2018.   5.  Slight asymmetry of soft tissues in the posterior oropharynx, left greater than right. This could be inflammatory or less likely neoplastic. Follow-up with ENT on a nonemergent basis is recommended.      CT-CEREBRAL PERFUSION ANALYSIS   Final Result      1.  Cerebral blood flow less than 30% likely representing completed infarct = 0 mL.      2.  T Max more than 6 seconds likely representing combination of completed infarct and ischemia = 40 mL.      3.  Mismatched volume likely representing ischemic brain/penumbra = 40 mL      4.  Please note that the cerebral perfusion was performed on the limited brain tissue around the basal ganglia region. Infarct/ischemia outside the CT perfusion sections can be missed in this study.      CT-HEAD W/O   Final Result      1.  Cerebral atrophy.      2.  White matter lucencies most consistent with small vessel ischemic change versus demyelination or gliosis.      3.  Otherwise, Head CT without contrast with no acute findings. No  "evidence of acute cerebral infarction, hemorrhage or mass lesion.           Assessment/Plan  * CVA (cerebral vascular accident) (HCC)- (present on admission)   Assessment & Plan    MRI brain showed embolic strokes  Started on aspirin, statin, dvt prophylaxis.  LDL 66  A1c 6.1%, prediabetes education ordered  TTE done, negative bubble study, no thrombus seen  Placed on telemetry to monitor for arrhythmias.  Permissive HTN  PT/OT/ST consulted. Stroke education ordered.  Will need 30-day cardiac event monitoring [facility]       Stage 4 chronic kidney disease (HCC)   Assessment & Plan    [facility] the [facility] shows that his creatinine has been greater than 2  UA showed protein  Renal US medical renal kidney disease  Avoid nephrotoxic medications, renally dose meds     Anemia- (present on admission)   Assessment & Plan    Vit B12 and ferritin normal  Retic is low  Outpatient followup     H/O aortic valve replacement- (present on admission)   Assessment & Plan    \"Cow\" valve per patient, placed by [facility]  Normal functioning bioprosthetic aortic valve on TTE     Brain tumor (HCC)- (present on admission)   Assessment & Plan    History of resection many years ago  Has some cognitive delay       History of seizure- (present on admission)   Assessment & Plan    EEG negative for seizure     Essential hypertension- (present on admission)   Assessment & Plan    Restarted home Coreg, Norvasc  Coreg was increased     Cardiomyopathy (HCC)- (present on admission)   Assessment & Plan    TTE here showed EF 55%  Continue on his home Coreg, increased dose for tachycardia          VTE prophylaxis: heparin, has been refusing      "

## 2019-06-29 PROCEDURE — A9270 NON-COVERED ITEM OR SERVICE: HCPCS | Performed by: HOSPITALIST

## 2019-06-29 PROCEDURE — 700102 HCHG RX REV CODE 250 W/ 637 OVERRIDE(OP): Performed by: INTERNAL MEDICINE

## 2019-06-29 PROCEDURE — 99232 SBSQ HOSP IP/OBS MODERATE 35: CPT | Performed by: INTERNAL MEDICINE

## 2019-06-29 PROCEDURE — 700111 HCHG RX REV CODE 636 W/ 250 OVERRIDE (IP): Performed by: INTERNAL MEDICINE

## 2019-06-29 PROCEDURE — A9270 NON-COVERED ITEM OR SERVICE: HCPCS | Performed by: INTERNAL MEDICINE

## 2019-06-29 PROCEDURE — 700102 HCHG RX REV CODE 250 W/ 637 OVERRIDE(OP): Performed by: HOSPITALIST

## 2019-06-29 PROCEDURE — 770020 HCHG ROOM/CARE - TELE (206)

## 2019-06-29 RX ORDER — CARVEDILOL 6.25 MG/1
25 TABLET ORAL 2 TIMES DAILY WITH MEALS
Status: DISCONTINUED | OUTPATIENT
Start: 2019-06-29 | End: 2019-06-30

## 2019-06-29 RX ADMIN — AMLODIPINE BESYLATE 10 MG: 5 TABLET ORAL at 06:15

## 2019-06-29 RX ADMIN — HEPARIN SODIUM 5000 UNITS: 5000 INJECTION, SOLUTION INTRAVENOUS; SUBCUTANEOUS at 06:16

## 2019-06-29 RX ADMIN — CARVEDILOL 25 MG: 6.25 TABLET, FILM COATED ORAL at 17:14

## 2019-06-29 RX ADMIN — ATORVASTATIN CALCIUM 80 MG: 80 TABLET, FILM COATED ORAL at 17:14

## 2019-06-29 RX ADMIN — ASPIRIN 325 MG: 325 TABLET, FILM COATED ORAL at 06:16

## 2019-06-29 RX ADMIN — HEPARIN SODIUM 5000 UNITS: 5000 INJECTION, SOLUTION INTRAVENOUS; SUBCUTANEOUS at 21:21

## 2019-06-29 ASSESSMENT — ENCOUNTER SYMPTOMS
HEADACHES: 0
SHORTNESS OF BREATH: 0
PHOTOPHOBIA: 0
VOMITING: 0
CHILLS: 0
SPUTUM PRODUCTION: 0
DIARRHEA: 0
SPEECH CHANGE: 0
MYALGIAS: 0
SENSORY CHANGE: 0
ABDOMINAL PAIN: 0
HEMOPTYSIS: 0
WEIGHT LOSS: 0
DIZZINESS: 0
BACK PAIN: 0
ORTHOPNEA: 0
EYE PAIN: 0
FOCAL WEAKNESS: 0
SEIZURES: 0
NECK PAIN: 0
PALPITATIONS: 0
DOUBLE VISION: 0

## 2019-06-29 NOTE — CARE PLAN
Problem: Infection  Goal: Will remain free from infection  Outcome: PROGRESSING AS EXPECTED      Problem: Infection:  Goal: Will remain free from infection  Outcome: PROGRESSING AS EXPECTED

## 2019-06-29 NOTE — PROGRESS NOTES
Hospital Medicine Daily Progress Note    Date of Service  6/29/2019    Chief Complaint  64 y.o. male admitted 6/24/2019 with right side weakness.    Hospital Course    PMH HTN, h/o bioprosthetic heart valve, brain tumor s/p resection who presented with acute right side weakness.  EEG did not show seizure.  MRI showed multiple infarcts.  Neurology was consulted.  He was monitored on telemetry showed sinus rhythm.  TTE showed EF 55%, moderate LVH, negative bubble study, normally functioning bioprosthetic aortic valve. PTOT recommended inpatient rehab. He will need f/u outpatient for 30-day cardiac monitor.      Interval Problem Update  Blood pressure was tendency to elevation, so heart rate.  I increased dose of carvedilol to 25 twice daily  Patient is laying comfortably on his bed without signs of distress.  Alert oriented x3    Consultants/Specialty  Neurology    Code Status  Full    Disposition  Declined by acute rehab  [facility]  Lives in [facility].  His family is in [place].    Review of Systems  Review of Systems   Constitutional: Negative for chills, malaise/fatigue and weight loss.   HENT: Negative for ear discharge, ear pain and tinnitus.    Eyes: Negative for double vision, photophobia and pain.   Respiratory: Negative for hemoptysis, sputum production and shortness of breath.    Cardiovascular: Negative for chest pain, palpitations and orthopnea.   Gastrointestinal: Negative for abdominal pain, diarrhea and vomiting.   Genitourinary: Negative for frequency, hematuria and urgency.   Musculoskeletal: Negative for back pain, myalgias and neck pain.   Neurological: Negative for dizziness, sensory change, speech change, focal weakness, seizures and headaches.        Physical Exam  Temp:  [36.3 °C (97.4 °F)-36.7 °C (98 °F)] 36.6 °C (97.9 °F)  Pulse:  [86-98] 98  Resp:  [16-20] 16  BP: (130-160)/() 153/101  SpO2:  [97 %-100 %] 100 %    Physical Exam    Constitutional: He is oriented to person, place, and time. He appears well-developed and well-nourished.   HENT:   Mouth/Throat: Oropharynx is clear and moist.   Well-healed surgical scars to scalp   Eyes: Pupils are equal, round, and reactive to light. Conjunctivae and EOM are normal.   Cardiovascular: Normal rate and regular rhythm.    Pulmonary/Chest: Effort normal. He has no wheezes.   Abdominal: Soft. There is no tenderness.   Musculoskeletal: He exhibits no edema.   Neurological: He is alert and oriented to person, place, and time.   Slow speech  5 out of 5 strength upper extremities  5 out of 5 strength left lower extremity  Mild right lower extremity weakness   Skin: Skin is warm and dry.   Psychiatric: His affect is blunt. He is slowed.   Nursing note and vitals reviewed.      Fluids    Intake/Output Summary (Last 24 hours) at 06/29/19 1407  Last data filed at 06/29/19 1300   Gross per 24 hour   Intake             3400 ml   Output             3725 ml   Net             -325 ml       Laboratory  Recent Labs      06/27/19   1115   WBC  7.0   RBC  4.32*   HEMOGLOBIN  11.9*   HEMATOCRIT  36.2*   MCV  83.8   MCH  27.5   MCHC  32.9*   RDW  45.9   PLATELETCT  175   MPV  12.1     Recent Labs      06/27/19   1115   SODIUM  139   POTASSIUM  4.7   CHLORIDE  111   CO2  22   GLUCOSE  94   BUN  48*   CREATININE  2.41*   CALCIUM  9.2                   Imaging  EC-ECHOCARDIOGRAM COMPLETE W/O CONT   Final Result      US-RENAL   Final Result      1.  No hydronephrosis   2.  Findings suggestive of chronic renal parenchymal disease   3.  Small BILATERAL renal cysts      CT-HEAD W/O   Final Result      No acute intracranial abnormality is identified.      Frontal encephalomalacia and advanced white matter disease is again demonstrated      Frontal cranioplasty      MR-BRAIN-W/O   Final Result      1.  Scattered punctate acute infarcts in the left frontal and parietal lobes.   2.  Small sized subacute to chronic right  parieto-occipital infarct.   3.  Status post bifrontal cranioplasty.   4.  Right frontal lobe encephalomalacia which may be posttraumatic, ischemic or postsurgical in nature.   5.  Moderate diffuse cerebral substance loss.   6.  Advanced microangiopathic ischemic change versus demyelination or gliosis.   7.  Chronic ischemic pontine gliosis.   8.  Chronic bilateral cerebellar lacunar infarcts.   9.  Multiple focal areas of old hemorrhage in the bilateral cerebellar lobes consistent with chronic hypertensive microhemorrhage.      DX-CHEST-PORTABLE (1 VIEW)   Final Result      1.  There is no acute cardiopulmonary process.   2.  Trace of left CP angle pleural fluid/pleural thickening is unchanged.      CT-CTA HEAD WITH & W/O-POST PROCESS   Final Result      1.  There is complete occlusion of the mid left A2 segment with associated atherosclerosis. There is corresponds to the abnormality seen on the perfusion exam.      Findings were discussed with DINA AGOSTO on 6/24/2019 at 12:27 PM.      CT-CTA NECK WITH & W/O-POST PROCESSING   Final Result      1.  There is atherosclerosis in both carotid bulbs and internal carotid artery origins with estimated less than 50% stenosis bilaterally.   2.  There is no evidence of carotid artery occlusion.   3.  Vertebral arteries demonstrate no dissection or occlusion.   4.  Stable 5 mm right lung apical nodule since 7/5/2018.   5.  Slight asymmetry of soft tissues in the posterior oropharynx, left greater than right. This could be inflammatory or less likely neoplastic. Follow-up with ENT on a nonemergent basis is recommended.      CT-CEREBRAL PERFUSION ANALYSIS   Final Result      1.  Cerebral blood flow less than 30% likely representing completed infarct = 0 mL.      2.  T Max more than 6 seconds likely representing combination of completed infarct and ischemia = 40 mL.      3.  Mismatched volume likely representing ischemic brain/penumbra = 40 mL      4.  Please note that the  "cerebral perfusion was performed on the limited brain tissue around the basal ganglia region. Infarct/ischemia outside the CT perfusion sections can be missed in this study.      CT-HEAD W/O   Final Result      1.  Cerebral atrophy.      2.  White matter lucencies most consistent with small vessel ischemic change versus demyelination or gliosis.      3.  Otherwise, Head CT without contrast with no acute findings. No evidence of acute cerebral infarction, hemorrhage or mass lesion.           Assessment/Plan  * CVA (cerebral vascular accident) (HCC)- (present on admission)   Assessment & Plan    MRI brain showed embolic strokes  Started on aspirin, statin, dvt prophylaxis.  LDL 66  A1c 6.1%, prediabetes education ordered  TTE done, negative bubble study, no thrombus seen  Placed on telemetry to monitor for arrhythmias.  Permissive HTN  PT/OT/ST consulted. Stroke education ordered.  Will need 30-day cardiac event monitoring [facility]  Neurologically stable       Stage 4 chronic kidney disease (HCC)   Assessment & Plan    [facility] shows that his creatinine has been greater than 2  UA showed protein  Renal US medical renal kidney disease  Avoid nephrotoxic medications, renally dose meds  Creatinine stable     Anemia- (present on admission)   Assessment & Plan    Vit B12 and ferritin normal  Retic is low  Outpatient followup     H/O aortic valve replacement- (present on admission)   Assessment & Plan    \"Cow\" valve per patient, placed by [facility]  Normal functioning bioprosthetic aortic valve on TTE     Brain tumor (HCC)- (present on admission)   Assessment & Plan    History of resection many years ago  Has some cognitive delay       History of seizure- (present on admission)   Assessment & Plan    EEG negative for seizure     Essential hypertension- (present on admission)   Assessment & Plan    Restarted home Coreg, Norvasc  Coreg was increased again on 6/29 to 25 twice " daily     Cardiomyopathy (HCC)- (present on admission)   Assessment & Plan    TTE here showed EF 55%  Continue on his home Coreg, increased dose for tachycardia          VTE prophylaxis: heparin, has been refusing

## 2019-06-29 NOTE — CARE PLAN
Problem: Safety  Goal: Will remain free from injury  Outcome: PROGRESSING AS EXPECTED  Call light within reach. Bed locked and in lowest position. Bed alarm on. Rounding maintained.    Problem: Venous Thromboembolism (VTW)/Deep Vein Thrombosis (DVT) Prevention:  Goal: Patient will participate in Venous Thrombosis (VTE)/Deep Vein Thrombosis (DVT)Prevention Measures  Outcome: PROGRESSING SLOWER THAN EXPECTED  Pt refusing SCDs and SQ heparin. Will continue to encourage and educate.    Problem: Safety:  Goal: Will remain free from injury  Outcome: PROGRESSING AS EXPECTED  Call light within reach. Bed locked and in lowest position. Bed alarm on. Rounding maintained.

## 2019-06-30 PROBLEM — I47.20 V-TACH (HCC): Status: ACTIVE | Noted: 2019-06-30

## 2019-06-30 LAB
ANION GAP SERPL CALC-SCNC: 6 MMOL/L (ref 0–11.9)
BASOPHILS # BLD AUTO: 0.7 % (ref 0–1.8)
BASOPHILS # BLD: 0.05 K/UL (ref 0–0.12)
BUN SERPL-MCNC: 56 MG/DL (ref 8–22)
CALCIUM SERPL-MCNC: 9 MG/DL (ref 8.5–10.5)
CHLORIDE SERPL-SCNC: 112 MMOL/L (ref 96–112)
CO2 SERPL-SCNC: 20 MMOL/L (ref 20–33)
CREAT SERPL-MCNC: 2.43 MG/DL (ref 0.5–1.4)
EKG IMPRESSION: NORMAL
EOSINOPHIL # BLD AUTO: 0.28 K/UL (ref 0–0.51)
EOSINOPHIL NFR BLD: 4 % (ref 0–6.9)
ERYTHROCYTE [DISTWIDTH] IN BLOOD BY AUTOMATED COUNT: 46.5 FL (ref 35.9–50)
GLUCOSE SERPL-MCNC: 115 MG/DL (ref 65–99)
HCT VFR BLD AUTO: 32 % (ref 42–52)
HGB BLD-MCNC: 10.3 G/DL (ref 14–18)
IMM GRANULOCYTES # BLD AUTO: 0.07 K/UL (ref 0–0.11)
IMM GRANULOCYTES NFR BLD AUTO: 1 % (ref 0–0.9)
LYMPHOCYTES # BLD AUTO: 1.18 K/UL (ref 1–4.8)
LYMPHOCYTES NFR BLD: 16.8 % (ref 22–41)
MCH RBC QN AUTO: 27.3 PG (ref 27–33)
MCHC RBC AUTO-ENTMCNC: 32.2 G/DL (ref 33.7–35.3)
MCV RBC AUTO: 84.9 FL (ref 81.4–97.8)
MONOCYTES # BLD AUTO: 0.73 K/UL (ref 0–0.85)
MONOCYTES NFR BLD AUTO: 10.4 % (ref 0–13.4)
NEUTROPHILS # BLD AUTO: 4.73 K/UL (ref 1.82–7.42)
NEUTROPHILS NFR BLD: 67.1 % (ref 44–72)
NRBC # BLD AUTO: 0 K/UL
NRBC BLD-RTO: 0 /100 WBC
PLATELET # BLD AUTO: 149 K/UL (ref 164–446)
PMV BLD AUTO: 11.6 FL (ref 9–12.9)
POTASSIUM SERPL-SCNC: 4.7 MMOL/L (ref 3.6–5.5)
RBC # BLD AUTO: 3.77 M/UL (ref 4.7–6.1)
SODIUM SERPL-SCNC: 138 MMOL/L (ref 135–145)
WBC # BLD AUTO: 7 K/UL (ref 4.8–10.8)

## 2019-06-30 PROCEDURE — 99232 SBSQ HOSP IP/OBS MODERATE 35: CPT | Performed by: INTERNAL MEDICINE

## 2019-06-30 PROCEDURE — 85025 COMPLETE CBC W/AUTO DIFF WBC: CPT

## 2019-06-30 PROCEDURE — 700111 HCHG RX REV CODE 636 W/ 250 OVERRIDE (IP): Performed by: INTERNAL MEDICINE

## 2019-06-30 PROCEDURE — 770020 HCHG ROOM/CARE - TELE (206)

## 2019-06-30 PROCEDURE — A9270 NON-COVERED ITEM OR SERVICE: HCPCS | Performed by: INTERNAL MEDICINE

## 2019-06-30 PROCEDURE — 36415 COLL VENOUS BLD VENIPUNCTURE: CPT

## 2019-06-30 PROCEDURE — A9270 NON-COVERED ITEM OR SERVICE: HCPCS | Performed by: HOSPITALIST

## 2019-06-30 PROCEDURE — 700102 HCHG RX REV CODE 250 W/ 637 OVERRIDE(OP): Performed by: INTERNAL MEDICINE

## 2019-06-30 PROCEDURE — 93010 ELECTROCARDIOGRAM REPORT: CPT | Performed by: INTERNAL MEDICINE

## 2019-06-30 PROCEDURE — 93005 ELECTROCARDIOGRAM TRACING: CPT | Performed by: INTERNAL MEDICINE

## 2019-06-30 PROCEDURE — 700102 HCHG RX REV CODE 250 W/ 637 OVERRIDE(OP): Performed by: HOSPITALIST

## 2019-06-30 PROCEDURE — 80048 BASIC METABOLIC PNL TOTAL CA: CPT

## 2019-06-30 RX ADMIN — CARVEDILOL 25 MG: 6.25 TABLET, FILM COATED ORAL at 08:25

## 2019-06-30 RX ADMIN — AMLODIPINE BESYLATE 10 MG: 5 TABLET ORAL at 05:51

## 2019-06-30 RX ADMIN — METOPROLOL TARTRATE 25 MG: 25 TABLET, FILM COATED ORAL at 18:00

## 2019-06-30 RX ADMIN — ASPIRIN 325 MG: 325 TABLET, FILM COATED ORAL at 05:51

## 2019-06-30 RX ADMIN — ATORVASTATIN CALCIUM 80 MG: 80 TABLET, FILM COATED ORAL at 16:38

## 2019-06-30 RX ADMIN — METOPROLOL TARTRATE 25 MG: 25 TABLET, FILM COATED ORAL at 10:11

## 2019-06-30 RX ADMIN — HEPARIN SODIUM 5000 UNITS: 5000 INJECTION, SOLUTION INTRAVENOUS; SUBCUTANEOUS at 21:38

## 2019-06-30 RX ADMIN — HEPARIN SODIUM 5000 UNITS: 5000 INJECTION, SOLUTION INTRAVENOUS; SUBCUTANEOUS at 05:51

## 2019-06-30 ASSESSMENT — ENCOUNTER SYMPTOMS
DOUBLE VISION: 0
NECK PAIN: 0
PHOTOPHOBIA: 0
EYE PAIN: 0
WEIGHT LOSS: 0
SEIZURES: 0
SPEECH CHANGE: 0
ORTHOPNEA: 0
VOMITING: 0
FOCAL WEAKNESS: 0
SENSORY CHANGE: 0
MYALGIAS: 0
SPUTUM PRODUCTION: 0
HEMOPTYSIS: 0
CHILLS: 0
DIARRHEA: 0
HEADACHES: 0
PALPITATIONS: 0
BACK PAIN: 0
DIZZINESS: 0
ABDOMINAL PAIN: 0
SHORTNESS OF BREATH: 0

## 2019-06-30 NOTE — PROGRESS NOTES
Hospital Medicine Daily Progress Note    Date of Service  6/30/2019    Chief Complaint  64 y.o. male admitted 6/24/2019 with right side weakness.    Hospital Course    PMH HTN, h/o bioprosthetic heart valve, brain tumor s/p resection who presented with acute right side weakness.  EEG did not show seizure.  MRI showed multiple infarcts.  Neurology was consulted.  He was monitored on telemetry showed sinus rhythm.  TTE showed EF 55%, moderate LVH, negative bubble study, normally functioning bioprosthetic aortic valve. PTOT recommended inpatient rehab. He will need f/u outpatient for 30-day cardiac monitor.      Interval Problem Update    Patient had 5 beats of V. tach overnight on telemetry, asymptomatic.  Heart rate is on the upper side.  I am switching from Coreg to metoprolol to be able to titrate off as a dose  Patient is laying comfortably on his bed without signs of distress.  Alert oriented x3    Consultants/Specialty  Neurology    Code Status  Full    Disposition  Declined by acute rehab  [facility]  Lives in [facility].  His family is in [place].    Review of Systems  Review of Systems   Constitutional: Negative for chills, malaise/fatigue and weight loss.   HENT: Negative for ear discharge, ear pain and tinnitus.    Eyes: Negative for double vision, photophobia and pain.   Respiratory: Negative for hemoptysis, sputum production and shortness of breath.    Cardiovascular: Negative for chest pain, palpitations and orthopnea.   Gastrointestinal: Negative for abdominal pain, diarrhea and vomiting.   Genitourinary: Negative for frequency, hematuria and urgency.   Musculoskeletal: Negative for back pain, myalgias and neck pain.   Neurological: Negative for dizziness, sensory change, speech change, focal weakness, seizures and headaches.        Physical Exam  Temp:  [36 °C (96.8 °F)-36.6 °C (97.9 °F)] 36 °C (96.8 °F)  Pulse:  [] 80  Resp:  [16-20] 16  BP:  (106-156)/() 128/88  SpO2:  [95 %-99 %] 97 %    Physical Exam   Constitutional: He is oriented to person, place, and time. He appears well-developed and well-nourished.   HENT:   Mouth/Throat: Oropharynx is clear and moist.   Well-healed surgical scars to scalp   Eyes: Pupils are equal, round, and reactive to light. Conjunctivae and EOM are normal.   Cardiovascular: Normal rate and regular rhythm.    Pulmonary/Chest: Effort normal. He has no wheezes.   Abdominal: Soft. There is no tenderness.   Musculoskeletal: He exhibits no edema.   Neurological: He is alert and oriented to person, place, and time.   Slow speech  5 out of 5 strength upper extremities  5 out of 5 strength left lower extremity  Mild right lower extremity weakness   Skin: Skin is warm and dry.   Psychiatric: His affect is blunt. He is slowed.   Nursing note and vitals reviewed.  I examined the patient on 6/30.  There is no significant changes to document as compared to 6/29    Fluids    Intake/Output Summary (Last 24 hours) at 06/30/19 1427  Last data filed at 06/30/19 1200   Gross per 24 hour   Intake              240 ml   Output             1500 ml   Net            -1260 ml       Laboratory  Recent Labs      06/30/19   0520   WBC  7.0   RBC  3.77*   HEMOGLOBIN  10.3*   HEMATOCRIT  32.0*   MCV  84.9   MCH  27.3   MCHC  32.2*   RDW  46.5   PLATELETCT  149*   MPV  11.6     Recent Labs      06/30/19   0520   SODIUM  138   POTASSIUM  4.7   CHLORIDE  112   CO2  20   GLUCOSE  115*   BUN  56*   CREATININE  2.43*   CALCIUM  9.0                   Imaging  EC-ECHOCARDIOGRAM COMPLETE W/O CONT   Final Result      US-RENAL   Final Result      1.  No hydronephrosis   2.  Findings suggestive of chronic renal parenchymal disease   3.  Small BILATERAL renal cysts      CT-HEAD W/O   Final Result      No acute intracranial abnormality is identified.      Frontal encephalomalacia and advanced white matter disease is again demonstrated      Frontal cranioplasty       MR-BRAIN-W/O   Final Result      1.  Scattered punctate acute infarcts in the left frontal and parietal lobes.   2.  Small sized subacute to chronic right parieto-occipital infarct.   3.  Status post bifrontal cranioplasty.   4.  Right frontal lobe encephalomalacia which may be posttraumatic, ischemic or postsurgical in nature.   5.  Moderate diffuse cerebral substance loss.   6.  Advanced microangiopathic ischemic change versus demyelination or gliosis.   7.  Chronic ischemic pontine gliosis.   8.  Chronic bilateral cerebellar lacunar infarcts.   9.  Multiple focal areas of old hemorrhage in the bilateral cerebellar lobes consistent with chronic hypertensive microhemorrhage.      DX-CHEST-PORTABLE (1 VIEW)   Final Result      1.  There is no acute cardiopulmonary process.   2.  Trace of left CP angle pleural fluid/pleural thickening is unchanged.      CT-CTA HEAD WITH & W/O-POST PROCESS   Final Result      1.  There is complete occlusion of the mid left A2 segment with associated atherosclerosis. There is corresponds to the abnormality seen on the perfusion exam.      Findings were discussed with DINA AGOSTO on 6/24/2019 at 12:27 PM.      CT-CTA NECK WITH & W/O-POST PROCESSING   Final Result      1.  There is atherosclerosis in both carotid bulbs and internal carotid artery origins with estimated less than 50% stenosis bilaterally.   2.  There is no evidence of carotid artery occlusion.   3.  Vertebral arteries demonstrate no dissection or occlusion.   4.  Stable 5 mm right lung apical nodule since 7/5/2018.   5.  Slight asymmetry of soft tissues in the posterior oropharynx, left greater than right. This could be inflammatory or less likely neoplastic. Follow-up with ENT on a nonemergent basis is recommended.      CT-CEREBRAL PERFUSION ANALYSIS   Final Result      1.  Cerebral blood flow less than 30% likely representing completed infarct = 0 mL.      2.  T Max more than 6 seconds likely representing  "combination of completed infarct and ischemia = 40 mL.      3.  Mismatched volume likely representing ischemic brain/penumbra = 40 mL      4.  Please note that the cerebral perfusion was performed on the limited brain tissue around the basal ganglia region. Infarct/ischemia outside the CT perfusion sections can be missed in this study.      CT-HEAD W/O   Final Result      1.  Cerebral atrophy.      2.  White matter lucencies most consistent with small vessel ischemic change versus demyelination or gliosis.      3.  Otherwise, Head CT without contrast with no acute findings. No evidence of acute cerebral infarction, hemorrhage or mass lesion.           Assessment/Plan  * CVA (cerebral vascular accident) (HCC)- (present on admission)   Assessment & Plan    MRI brain showed embolic strokes  Started on aspirin, statin, dvt prophylaxis.  LDL 66  A1c 6.1%, prediabetes education ordered  TTE done, negative bubble study, no thrombus seen  Placed on telemetry to monitor for arrhythmias.  Permissive HTN  PT/OT/ST consulted. Stroke education ordered.  Will need 30-day cardiac event [facility]  Neurologically stable       V-tach ([facility])   Assessment & Plan    Nonsustained, asymptomatic.  I switched from carvedilol to metoprolol and might titrate up dose tomorrow if it recurs     Stage 4 chronic kidney disease (HCC)   Assessment & Plan    [facility] shows that his creatinine has been greater than 2  UA showed protein  Renal US medical renal kidney disease  Avoid nephrotoxic medications, renally dose meds  Creatinine stable     Anemia- (present on admission)   Assessment & Plan    Vit B12 and ferritin normal  Retic is low  Outpatient followup     H/O aortic valve replacement- (present on admission)   Assessment & Plan    \"Cow\" valve per patient, placed by [facility]  Normal functioning bioprosthetic aortic valve on TTE     Brain tumor (HCC)- (present on admission)   Assessment & " Plan    History of resection many years ago  Has some cognitive delay       History of seizure- (present on admission)   Assessment & Plan    EEG negative for seizure     Essential hypertension- (present on admission)   Assessment & Plan    Restarted home Coreg, Norvasc  Coreg was increased again on 6/29 to 25 twice daily     Cardiomyopathy (HCC)- (present on admission)   Assessment & Plan    TTE here showed EF 55%  Carvedilol switch to metoprolol, titrate up as needed for tachycardia or episodes of V. tach          VTE prophylaxis: heparin, has been refusing

## 2019-06-30 NOTE — PROGRESS NOTES
Monitor summary: SR , LA 0.16, QRS 0.10, QT 0.34, per strip from monitor room. 0136 possible 5 beat run vtach followed by 8 beat run accelerated idio per strip from monitor room. Pt asymptomatic during episode.

## 2019-06-30 NOTE — CARE PLAN
Problem: Venous Thromboembolism (VTW)/Deep Vein Thrombosis (DVT) Prevention:  Goal: Patient will participate in Venous Thrombosis (VTE)/Deep Vein Thrombosis (DVT)Prevention Measures  Outcome: PROGRESSING AS EXPECTED  Pt refusing SCDs. SQ heparin as ordered.    Problem: Bowel/Gastric:  Goal: Normal bowel function is maintained or improved  Outcome: PROGRESSING AS EXPECTED  BM 06/29.

## 2019-07-01 PROBLEM — I44.1 AV BLOCK, 2ND DEGREE: Status: ACTIVE | Noted: 2019-07-01

## 2019-07-01 LAB
ANION GAP SERPL CALC-SCNC: 9 MMOL/L (ref 0–11.9)
BUN SERPL-MCNC: 54 MG/DL (ref 8–22)
CALCIUM SERPL-MCNC: 9.5 MG/DL (ref 8.5–10.5)
CHLORIDE SERPL-SCNC: 108 MMOL/L (ref 96–112)
CO2 SERPL-SCNC: 21 MMOL/L (ref 20–33)
CREAT SERPL-MCNC: 2.4 MG/DL (ref 0.5–1.4)
GLUCOSE SERPL-MCNC: 91 MG/DL (ref 65–99)
MAGNESIUM SERPL-MCNC: 2.1 MG/DL (ref 1.5–2.5)
POTASSIUM SERPL-SCNC: 4.6 MMOL/L (ref 3.6–5.5)
SODIUM SERPL-SCNC: 138 MMOL/L (ref 135–145)

## 2019-07-01 PROCEDURE — A9270 NON-COVERED ITEM OR SERVICE: HCPCS | Performed by: INTERNAL MEDICINE

## 2019-07-01 PROCEDURE — 700111 HCHG RX REV CODE 636 W/ 250 OVERRIDE (IP): Performed by: INTERNAL MEDICINE

## 2019-07-01 PROCEDURE — A9270 NON-COVERED ITEM OR SERVICE: HCPCS | Performed by: HOSPITALIST

## 2019-07-01 PROCEDURE — 700102 HCHG RX REV CODE 250 W/ 637 OVERRIDE(OP): Performed by: INTERNAL MEDICINE

## 2019-07-01 PROCEDURE — 36415 COLL VENOUS BLD VENIPUNCTURE: CPT

## 2019-07-01 PROCEDURE — 700102 HCHG RX REV CODE 250 W/ 637 OVERRIDE(OP): Performed by: HOSPITALIST

## 2019-07-01 PROCEDURE — 97535 SELF CARE MNGMENT TRAINING: CPT

## 2019-07-01 PROCEDURE — 97112 NEUROMUSCULAR REEDUCATION: CPT

## 2019-07-01 PROCEDURE — 80048 BASIC METABOLIC PNL TOTAL CA: CPT

## 2019-07-01 PROCEDURE — 770020 HCHG ROOM/CARE - TELE (206)

## 2019-07-01 PROCEDURE — 97530 THERAPEUTIC ACTIVITIES: CPT

## 2019-07-01 PROCEDURE — 83735 ASSAY OF MAGNESIUM: CPT

## 2019-07-01 PROCEDURE — 99232 SBSQ HOSP IP/OBS MODERATE 35: CPT | Performed by: INTERNAL MEDICINE

## 2019-07-01 RX ADMIN — METOPROLOL TARTRATE 25 MG: 25 TABLET, FILM COATED ORAL at 17:26

## 2019-07-01 RX ADMIN — HEPARIN SODIUM 5000 UNITS: 5000 INJECTION, SOLUTION INTRAVENOUS; SUBCUTANEOUS at 20:54

## 2019-07-01 RX ADMIN — HEPARIN SODIUM 5000 UNITS: 5000 INJECTION, SOLUTION INTRAVENOUS; SUBCUTANEOUS at 05:06

## 2019-07-01 RX ADMIN — AMLODIPINE BESYLATE 10 MG: 5 TABLET ORAL at 05:07

## 2019-07-01 RX ADMIN — METOPROLOL TARTRATE 25 MG: 25 TABLET, FILM COATED ORAL at 05:07

## 2019-07-01 RX ADMIN — ATORVASTATIN CALCIUM 80 MG: 80 TABLET, FILM COATED ORAL at 17:26

## 2019-07-01 RX ADMIN — ASPIRIN 325 MG: 325 TABLET, FILM COATED ORAL at 05:06

## 2019-07-01 ASSESSMENT — ENCOUNTER SYMPTOMS
SHORTNESS OF BREATH: 0
BACK PAIN: 0
VOMITING: 0
MYALGIAS: 0
WEIGHT LOSS: 0
ABDOMINAL PAIN: 0
SEIZURES: 0
DOUBLE VISION: 0
FOCAL WEAKNESS: 0
ORTHOPNEA: 0
HEADACHES: 0
SENSORY CHANGE: 0
DIARRHEA: 0
DIZZINESS: 0
EYE PAIN: 0
PHOTOPHOBIA: 0
HEMOPTYSIS: 0
NECK PAIN: 0
CHILLS: 0
SPEECH CHANGE: 0
PALPITATIONS: 0
SPUTUM PRODUCTION: 0

## 2019-07-01 ASSESSMENT — COGNITIVE AND FUNCTIONAL STATUS - GENERAL
HELP NEEDED FOR BATHING: A LITTLE
DRESSING REGULAR LOWER BODY CLOTHING: A LITTLE
TOILETING: A LITTLE
DAILY ACTIVITIY SCORE: 20
PERSONAL GROOMING: A LITTLE
SUGGESTED CMS G CODE MODIFIER DAILY ACTIVITY: CJ

## 2019-07-01 NOTE — CARE PLAN
Problem: Safety  Goal: Will remain free from injury  Outcome: PROGRESSING AS EXPECTED  Call bell within reach. Bed locked and in lowest position. Bed alarm on. Rounding maintained.     Problem: Venous Thromboembolism (VTW)/Deep Vein Thrombosis (DVT) Prevention:  Goal: Patient will participate in Venous Thrombosis (VTE)/Deep Vein Thrombosis (DVT)Prevention Measures  Outcome: PROGRESSING AS EXPECTED  Pt refusing SCDs. SQ heparin as ordered.    Problem: Safety:  Goal: Will remain free from injury  Outcome: PROGRESSING AS EXPECTED  Call bell within reach. Bed locked and in lowest position. Bed alarm on. Rounding maintained.

## 2019-07-01 NOTE — CARE PLAN
Problem: Safety  Goal: Will remain free from falls  Outcome: PROGRESSING SLOWER THAN EXPECTED  Bed in lowest position, wheels locked. Pt noncompliant with safety measures, pt not receptive to education despite multiple attempts/approaches. Bed alarm activated/audible. Hourly rounding.    Problem: Venous Thromboembolism (VTW)/Deep Vein Thrombosis (DVT) Prevention:  Goal: Patient will participate in Venous Thrombosis (VTE)/Deep Vein Thrombosis (DVT)Prevention Measures  Outcome: PROGRESSING AS EXPECTED  Pt refusing SCDs despite education. Continues heparin as ordered. Ambulatory to bathroom. Pt remains free from s/s VTE/DVT.

## 2019-07-01 NOTE — PROGRESS NOTES
Monitor summary: SR 79-99, PA 0.14, QRS 0.08, QT 0.34, per strip from monitor room. Pause around 2100, pt asymptomatic- strip in chart.

## 2019-07-01 NOTE — PROGRESS NOTES
A&Ox4. Intermittently agitated. Neuro checks unchanged. MD reviewed tele, discussed at bedside 2nd degree AV block w/ 6 beats NSVT, asymptomatic, awaiting cards recs. OOB ambulating with walker and SBA x1, RLE weakness. Fall precautions/safety maintained. Pt noncompliant with calling for assistance OOB. Intermittently refusing care despite education. Bed alarm activated/audible. Hourly rounding. Safety maintained. Will continue to monitor.

## 2019-07-01 NOTE — THERAPY
"Occupational Therapy Treatment completed with focus on ADLs, ADL transfers and patient education.  Functional Status: Sit to stands CGA, standing grooming at sink min A, LB undressing/re-dressing min A  Plan of Care: Will benefit from Occupational Therapy 4 times per week  Discharge Recommendations:  Equipment Will Continue to Assess for Equipment Needs. Post-acute therapy: Recommend post-acute placement for additional occupational therapy services prior to discharge home. Patient can tolerate post-acute therapies at a 5x/week frequency.    See \"Rehab Therapy-Acute\" Patient Summary Report for complete documentation.     Pt seen for OT tx. Received EOB. Pt doffed B socks, underpants, donned clean underpants, new socks and hospital pants with slight min A for over feet. Pt stood and had immediate LOB requiring assist to recover. Mobilized to sink with FWW and completed standing shave. Required extra time, but was able to maintain attention to task and sequence task well. Pt stood for >15 minutes. Demonstrated increased WOB, but denied fatigue. Returned to EOB using FWW. Pt admits he has decreased balance, but states he doesn't like FWW because \"I've always been pretty self-sufficient\". Pt demos limited insight to fall risk. Acute OT to continue following to progress ADL, safety with transfers and standing activity. Pt would benefit from post-acute transitional care setting to maximize functional independence.   "

## 2019-07-01 NOTE — PROGRESS NOTES
Monitor summary: SR 79-95, MT 0.20, QRS 0.10, QT 0.36, per strip from monitor room. 2nd degree type II block at 1257. Pt asymptomatic during this episode and MD was notified.

## 2019-07-01 NOTE — PROGRESS NOTES
Assumed care of pt w/ bedside report from TIANA Pastor. Pt sitting on EOB. Fall precautions in place. Noncompliant with safety measures, education reinforced. Bed alarm activated/audible. Hourly rounding. Will continue to monitor.

## 2019-07-01 NOTE — PROGRESS NOTES
Hospital Medicine Daily Progress Note    Date of Service  7/1/2019    Chief Complaint  64 y.o. male admitted 6/24/2019 with right side weakness.    Hospital Course    PMH HTN, h/o bioprosthetic heart valve, brain tumor s/p resection who presented with acute right side weakness.  EEG did not show seizure.  MRI showed multiple infarcts.  Neurology was consulted.  He was monitored on telemetry showed sinus rhythm.  TTE showed EF 55%, moderate LVH, negative bubble study, normally functioning bioprosthetic aortic valve. PTOT recommended inpatient rehab. He will need f/u outpatient for 30-day cardiac monitor.      Interval Problem Update    Patient is in no distress.  Overnight she had what appears to be second-degree AV block type II evidenced as skipping of 1 complex in 2 occasions with preserved P waves  I discussed case with Dr. Escoto/cardiologist on-call.  Who recommended to continue observation.  In case patient continue having episodes of AV block, he recommended reduction in beta-blocker dose  Additionally, ambulate the patient and continue telemetry observation  Consultants/Specialty  Neurology    Code Status  Full    Disposition  Declined by acute rehab  [facility]  Lives in [facility].  His family is in [place].    Review of Systems  Review of Systems   Constitutional: Negative for chills, malaise/fatigue and weight loss.   HENT: Negative for ear discharge, ear pain and tinnitus.    Eyes: Negative for double vision, photophobia and pain.   Respiratory: Negative for hemoptysis, sputum production and shortness of breath.    Cardiovascular: Negative for chest pain, palpitations and orthopnea.   Gastrointestinal: Negative for abdominal pain, diarrhea and vomiting.   Genitourinary: Negative for frequency, hematuria and urgency.   Musculoskeletal: Negative for back pain, myalgias and neck pain.   Neurological: Negative for dizziness, sensory change, speech change, focal  weakness, seizures and headaches.        Physical Exam  Temp:  [36.7 °C (98 °F)-36.9 °C (98.4 °F)] 36.7 °C (98 °F)  Pulse:  [91-97] 91  Resp:  [16] 16  BP: (121-142)/(81-88) 121/81  SpO2:  [93 %-99 %] 93 %    Physical Exam   Constitutional: He is oriented to person, place, and time. He appears well-developed and well-nourished.   HENT:   Mouth/Throat: Oropharynx is clear and moist.   Well-healed surgical scars to scalp   Eyes: Pupils are equal, round, and reactive to light. Conjunctivae and EOM are normal.   Cardiovascular: Normal rate and regular rhythm.    Pulmonary/Chest: Effort normal. He has no wheezes.   Abdominal: Soft. There is no tenderness.   Musculoskeletal: He exhibits no edema.   Neurological: He is alert and oriented to person, place, and time.   Slow speech  5 out of 5 strength upper extremities  5 out of 5 strength left lower extremity  Mild right lower extremity weakness   Skin: Skin is warm and dry.   Psychiatric: His affect is blunt. He is slowed.   Nursing note and vitals reviewed.  I examined the patient on 7/1.  There is no significant changes to document as compared to 6/30    Fluids    Intake/Output Summary (Last 24 hours) at 07/01/19 1658  Last data filed at 07/01/19 1100   Gross per 24 hour   Intake              480 ml   Output             2375 ml   Net            -1895 ml       Laboratory  Recent Labs      06/30/19   0520   WBC  7.0   RBC  3.77*   HEMOGLOBIN  10.3*   HEMATOCRIT  32.0*   MCV  84.9   MCH  27.3   MCHC  32.2*   RDW  46.5   PLATELETCT  149*   MPV  11.6     Recent Labs      06/30/19   0520  07/01/19   1438   SODIUM  138  138   POTASSIUM  4.7  4.6   CHLORIDE  112  108   CO2  20  21   GLUCOSE  115*  91   BUN  56*  54*   CREATININE  2.43*  2.40*   CALCIUM  9.0  9.5                   Imaging  EC-ECHOCARDIOGRAM COMPLETE W/O CONT   Final Result      US-RENAL   Final Result      1.  No hydronephrosis   2.  Findings suggestive of chronic renal parenchymal disease   3.  Small BILATERAL  renal cysts      CT-HEAD W/O   Final Result      No acute intracranial abnormality is identified.      Frontal encephalomalacia and advanced white matter disease is again demonstrated      Frontal cranioplasty      MR-BRAIN-W/O   Final Result      1.  Scattered punctate acute infarcts in the left frontal and parietal lobes.   2.  Small sized subacute to chronic right parieto-occipital infarct.   3.  Status post bifrontal cranioplasty.   4.  Right frontal lobe encephalomalacia which may be posttraumatic, ischemic or postsurgical in nature.   5.  Moderate diffuse cerebral substance loss.   6.  Advanced microangiopathic ischemic change versus demyelination or gliosis.   7.  Chronic ischemic pontine gliosis.   8.  Chronic bilateral cerebellar lacunar infarcts.   9.  Multiple focal areas of old hemorrhage in the bilateral cerebellar lobes consistent with chronic hypertensive microhemorrhage.      DX-CHEST-PORTABLE (1 VIEW)   Final Result      1.  There is no acute cardiopulmonary process.   2.  Trace of left CP angle pleural fluid/pleural thickening is unchanged.      CT-CTA HEAD WITH & W/O-POST PROCESS   Final Result      1.  There is complete occlusion of the mid left A2 segment with associated atherosclerosis. There is corresponds to the abnormality seen on the perfusion exam.      Findings were discussed with DINA AGOSTO on 6/24/2019 at 12:27 PM.      CT-CTA NECK WITH & W/O-POST PROCESSING   Final Result      1.  There is atherosclerosis in both carotid bulbs and internal carotid artery origins with estimated less than 50% stenosis bilaterally.   2.  There is no evidence of carotid artery occlusion.   3.  Vertebral arteries demonstrate no dissection or occlusion.   4.  Stable 5 mm right lung apical nodule since 7/5/2018.   5.  Slight asymmetry of soft tissues in the posterior oropharynx, left greater than right. This could be inflammatory or less likely neoplastic. Follow-up with ENT on a nonemergent basis is  recommended.      CT-CEREBRAL PERFUSION ANALYSIS   Final Result      1.  Cerebral blood flow less than 30% likely representing completed infarct = 0 mL.      2.  T Max more than 6 seconds likely representing combination of completed infarct and ischemia = 40 mL.      3.  Mismatched volume likely representing ischemic brain/penumbra = 40 mL      4.  Please note that the cerebral perfusion was performed on the limited brain tissue around the basal ganglia region. Infarct/ischemia outside the CT perfusion sections can be missed in this study.      CT-HEAD W/O   Final Result      1.  Cerebral atrophy.      2.  White matter lucencies most consistent with small vessel ischemic change versus demyelination or gliosis.      3.  Otherwise, Head CT without contrast with no acute findings. No evidence of acute cerebral infarction, hemorrhage or mass lesion.           Assessment/Plan  * CVA (cerebral vascular accident) ([facility])- (present on admission)   Assessment & Plan    MRI brain showed embolic strokes  Started on aspirin, statin, dvt prophylaxis.  LDL 66  A1c 6.1%, prediabetes education ordered  TTE done, negative bubble study, no thrombus seen  Placed on telemetry to monitor for arrhythmias.  Permissive HTN  PT/OT/ST consulted. Stroke education ordered.  Will need 30-day cardiac event monitoring [facility]  Neurologically stable       AV block, 2nd degree   Assessment & Plan    In 2 occasions, skipping only 1 complex, asymptomatic  Discussed with cardiology Dr. Escoto  Recommended to continue clinical observation  In case it reoccurs, will need beta-blocker dose reduction  Continue observation on telemetry     V-tach ([facility])   Assessment & Plan    Nonsustained, asymptomatic.  I switched from carvedilol to metoprolol and might titrate up dose tomorrow if it recurs  No more episodes of VT     Stage 4 chronic kidney disease ([facility])   Assessment & Plan    [facility] shows that his [facility] has been greater  "than 2  UA showed protein  Renal US medical renal kidney disease  Avoid nephrotoxic medications, renally dose meds  Creatinine stable     Anemia- (present on admission)   Assessment & Plan    Vit B12 and ferritin normal  Retic is low  Outpatient followup     H/O aortic valve replacement- (present on admission)   Assessment & Plan    \"Cow\" valve per patient, placed by [facility]  Normal functioning bioprosthetic aortic valve on TTE     Brain tumor (HCC)- (present on admission)   Assessment & Plan    History of resection many years ago  Has some cognitive delay       History of seizure- (present on admission)   Assessment & Plan    EEG negative for seizure     Essential hypertension- (present on admission)   Assessment & Plan    Restarted home Coreg, Norvasc   carvedilol switched to metoprolol as patient was having nonsustained VT on maximal dose of carvedilol 25 twice daily     Cardiomyopathy (HCC)- (present on admission)   Assessment & Plan    TTE here showed EF 55%  Carvedilol switch to metoprolol, titrate up as needed for tachycardia or episodes of V. tach          VTE prophylaxis: heparin, has been refusing      "

## 2019-07-01 NOTE — DISCHARGE PLANNING
Anticipated Discharge Disposition:   [facility]    Action:    Application for extended care services faxed to Hannah Diallo RN with [facility].    Hannah Diallo [phone], Fax [phone]    Left vm for Hannah with request to return call.    Barriers to Discharge:    [facility] acceptance    Plan:    F/U with Hannah at [facility].

## 2019-07-01 NOTE — PROGRESS NOTES
Pt A/O x4. Call bell within reach. Bed locked and in lowest position. Bed alarm on. Pt OOB with front wheel walker and stand by assist. Non slip socks on. Rounding maintained.

## 2019-07-02 PROCEDURE — 700102 HCHG RX REV CODE 250 W/ 637 OVERRIDE(OP): Performed by: INTERNAL MEDICINE

## 2019-07-02 PROCEDURE — A9270 NON-COVERED ITEM OR SERVICE: HCPCS | Performed by: INTERNAL MEDICINE

## 2019-07-02 PROCEDURE — 99232 SBSQ HOSP IP/OBS MODERATE 35: CPT | Performed by: HOSPITALIST

## 2019-07-02 PROCEDURE — 770020 HCHG ROOM/CARE - TELE (206)

## 2019-07-02 PROCEDURE — 97530 THERAPEUTIC ACTIVITIES: CPT

## 2019-07-02 PROCEDURE — A9270 NON-COVERED ITEM OR SERVICE: HCPCS | Performed by: HOSPITALIST

## 2019-07-02 PROCEDURE — 97116 GAIT TRAINING THERAPY: CPT

## 2019-07-02 PROCEDURE — 700102 HCHG RX REV CODE 250 W/ 637 OVERRIDE(OP): Performed by: HOSPITALIST

## 2019-07-02 PROCEDURE — 700111 HCHG RX REV CODE 636 W/ 250 OVERRIDE (IP): Performed by: INTERNAL MEDICINE

## 2019-07-02 RX ADMIN — HEPARIN SODIUM 5000 UNITS: 5000 INJECTION, SOLUTION INTRAVENOUS; SUBCUTANEOUS at 21:12

## 2019-07-02 RX ADMIN — METOPROLOL TARTRATE 25 MG: 25 TABLET, FILM COATED ORAL at 05:15

## 2019-07-02 RX ADMIN — AMLODIPINE BESYLATE 10 MG: 5 TABLET ORAL at 05:15

## 2019-07-02 RX ADMIN — ASPIRIN 325 MG: 325 TABLET, FILM COATED ORAL at 05:16

## 2019-07-02 RX ADMIN — METOPROLOL TARTRATE 25 MG: 25 TABLET, FILM COATED ORAL at 17:12

## 2019-07-02 RX ADMIN — HEPARIN SODIUM 5000 UNITS: 5000 INJECTION, SOLUTION INTRAVENOUS; SUBCUTANEOUS at 13:24

## 2019-07-02 RX ADMIN — ATORVASTATIN CALCIUM 80 MG: 80 TABLET, FILM COATED ORAL at 17:12

## 2019-07-02 RX ADMIN — HEPARIN SODIUM 5000 UNITS: 5000 INJECTION, SOLUTION INTRAVENOUS; SUBCUTANEOUS at 05:16

## 2019-07-02 ASSESSMENT — ENCOUNTER SYMPTOMS
FOCAL WEAKNESS: 0
DIAPHORESIS: 0
SENSORY CHANGE: 0
NECK PAIN: 0
FEVER: 0
VOMITING: 0
WHEEZING: 0
WEAKNESS: 0
DIARRHEA: 0
TINGLING: 0
COUGH: 0
STRIDOR: 0
CHILLS: 0
SHORTNESS OF BREATH: 0
BACK PAIN: 0
ABDOMINAL PAIN: 0

## 2019-07-02 ASSESSMENT — GAIT ASSESSMENTS
DEVIATION: OTHER (COMMENT)
GAIT LEVEL OF ASSIST: MINIMAL ASSIST
ASSISTIVE DEVICE: FRONT WHEEL WALKER
DISTANCE (FEET): 75

## 2019-07-02 ASSESSMENT — COGNITIVE AND FUNCTIONAL STATUS - GENERAL
MOVING FROM LYING ON BACK TO SITTING ON SIDE OF FLAT BED: A LOT
STANDING UP FROM CHAIR USING ARMS: A LITTLE
CLIMB 3 TO 5 STEPS WITH RAILING: A LITTLE
MOBILITY SCORE: 19
SUGGESTED CMS G CODE MODIFIER MOBILITY: CK
WALKING IN HOSPITAL ROOM: A LITTLE

## 2019-07-02 NOTE — PROGRESS NOTES
Telemetry Shift Summary    Rhythm NSR  HR Range 80s-90s  Ectopy PVCs, 4-6 beats NSVT  Measurements .18/.08/.34        Normal Values  Rhythm SR  HR Range    Measurements 0.12-0.20 / 0.06-0.10  / 0.30-0.52

## 2019-07-02 NOTE — CONSULTS
Diabetes education: Order received for dm education for pre diabetes. Pt was admitted with blood sugar of 140 and Hg a1c of 6.1% .  Pt had one finger stick for 111 and glucose readings of 118, 94, 115, and today of 91. Pt admitted with CVA, and some cognitive delay secondary to brain tumor ( resected many years ago per MD note). No discussion of dm or pre diabetes. No diabetes medications, sporadic finger sticks and is on a regular diet.   Plan: Pt not appropriate for pre diabetes education ( not sure if he qualifies as well). Please reorder if needs change.

## 2019-07-02 NOTE — DISCHARGE PLANNING
Anticipated Discharge Disposition:   [facility]    Action:    Spoke to Hannah Diallo RN, [facility] Admissions Coordinator and patient accepted.  Bed available 7-3-19.  Ok to transport 1000.  Transport form faxed to [facility].    Pt informed and agreeable. Verbal for Cobra obtained.    Cobra signed by Dr. Smith.      Barriers to Discharge:    None    Plan:    DC 7-3-19.

## 2019-07-02 NOTE — CARE PLAN
Problem: Safety  Goal: Will remain free from injury  Outcome: PROGRESSING AS EXPECTED  Educated and encouraged pt on the importance of using call light for assistance and needs.    Problem: Knowledge Deficit  Goal: Knowledge of disease process/condition, treatment plan, diagnostic tests, and medications will improve  Pt requires reinforcement of plan of care.    Problem: Safety:  Goal: Will remain free from injury  Outcome: PROGRESSING AS EXPECTED  Educated and encouraged pt on the importance of using call light for assistance and needs.    Problem: Knowledge Deficit:  Goal: Knowledge of disease process/condition, treatment plan, diagnostic tests, and medications will improve  Pt requires reinforcement of plan of care.

## 2019-07-02 NOTE — DISCHARGE PLANNING
Received Transport Form @ 1400  Spoke to Marivel @ [facility]    Transport is scheduled for 7/2 @ 1030 going to [facility]. Elisa(TIANA CM) notified of transport time.

## 2019-07-02 NOTE — PROGRESS NOTES
Bedside report given to TIANA Rico. POC discussed, all questions answered. Safety precautions in place. Care released.

## 2019-07-02 NOTE — DOCUMENTATION QUERY
[facility]                                                                       Query Response Note      PATIENT:               TUSHAR YARBROUGH  ACCT #:                  [id number]  MRN:                     [id number]  :                      1955  ADMIT DATE:       2019 11:30 AM  DISCH DATE:          RESPONDING  PROVIDER #:        [id number]           QUERY TEXT:    Cardiomyopathy (type unspecified) is documented in the Medical Record. Please clarify the type of cardiomyopathy.    NOTE:  If an appropriate response is not listed below, please respond with a new note.    The patient's Clinical Indicators include:  ECHO: EF 55% Mod concentric left ventricular hypertrophy  - EKG :Left ventricular hypertrophy now present   Myocardial infarct finding now present   V-Tach  Carvedilol switch to metoprolol  Bioprosthetic aortic valve    Query created by: Hollye Chirinos on 2019 11:44 AM    RESPONSE TEXT:    Clinically unable to determine          Electronically signed by:  MERLYN BLUNT MD 2019 6:06 AM

## 2019-07-03 VITALS
HEIGHT: 71 IN | SYSTOLIC BLOOD PRESSURE: 136 MMHG | HEART RATE: 80 BPM | OXYGEN SATURATION: 100 % | BODY MASS INDEX: 32.87 KG/M2 | WEIGHT: 234.79 LBS | RESPIRATION RATE: 16 BRPM | TEMPERATURE: 97.5 F | DIASTOLIC BLOOD PRESSURE: 92 MMHG

## 2019-07-03 PROCEDURE — 700102 HCHG RX REV CODE 250 W/ 637 OVERRIDE(OP): Performed by: HOSPITALIST

## 2019-07-03 PROCEDURE — A9270 NON-COVERED ITEM OR SERVICE: HCPCS | Performed by: HOSPITALIST

## 2019-07-03 PROCEDURE — 700111 HCHG RX REV CODE 636 W/ 250 OVERRIDE (IP): Performed by: INTERNAL MEDICINE

## 2019-07-03 PROCEDURE — 99239 HOSP IP/OBS DSCHRG MGMT >30: CPT | Performed by: HOSPITALIST

## 2019-07-03 PROCEDURE — A9270 NON-COVERED ITEM OR SERVICE: HCPCS | Performed by: INTERNAL MEDICINE

## 2019-07-03 PROCEDURE — 700102 HCHG RX REV CODE 250 W/ 637 OVERRIDE(OP): Performed by: INTERNAL MEDICINE

## 2019-07-03 RX ORDER — HEPARIN SODIUM 5000 [USP'U]/ML
5000 INJECTION, SOLUTION INTRAVENOUS; SUBCUTANEOUS EVERY 8 HOURS
Refills: 0
Start: 2019-07-03 | End: 2023-10-11

## 2019-07-03 RX ADMIN — ASPIRIN 325 MG: 325 TABLET, FILM COATED ORAL at 06:19

## 2019-07-03 RX ADMIN — HEPARIN SODIUM 5000 UNITS: 5000 INJECTION, SOLUTION INTRAVENOUS; SUBCUTANEOUS at 06:19

## 2019-07-03 RX ADMIN — AMLODIPINE BESYLATE 10 MG: 5 TABLET ORAL at 06:19

## 2019-07-03 RX ADMIN — METOPROLOL TARTRATE 25 MG: 25 TABLET, FILM COATED ORAL at 06:19

## 2019-07-03 NOTE — CARE PLAN
Problem: Safety  Goal: Will remain free from injury    Intervention: Provide assistance with mobility  Educated pt on using call light for all needs and assistance. Bed alarm in place.      Problem: Knowledge Deficit  Goal: Knowledge of disease process/condition, treatment plan, diagnostic tests, and medications will improve  Outcome: PROGRESSING AS EXPECTED  Pt educated on risk and s/s of stroke. Pt able to verbalize understanding.    Problem: Safety:  Goal: Will remain free from injury    Intervention: Provide assistance with mobility  Educated pt on using call light for all needs and assistance. Bed alarm in place.      Problem: Knowledge Deficit:  Goal: Knowledge of disease process/condition, treatment plan, diagnostic tests, and medications will improve  Outcome: PROGRESSING AS EXPECTED  Pt educated on risk and s/s of stroke. Pt able to verbalize understanding.

## 2019-07-03 NOTE — PROGRESS NOTES
Monitor summary: SR 85-95, NJ 0.14, QRS 0.08, QT 0.32, with rare PVCs per strip from monitor room.

## 2019-07-03 NOTE — DISCHARGE SUMMARY
Hospital Medicine Discharge Note     Admit Date:  6/24/2019       Discharge Date:   7/3/2019    Attending Physician:  Israel Smith M.D.      Diagnoses (includes active and resolved):     Principal Problem:    Acute CVA s (cerebral vascular accident) (HCC) POA: Yes  Active Problems:    Cardiomyopathy (HCC) POA: Yes    Essential hypertension POA: Yes    History of seizure POA: Yes    History of brain tumor (HCC) POA: Yes    History of cognitive deficit    H/O aortic valve replacement POA: Yes    Anemia POA: Yes    Stage 4 chronic kidney disease (HCC) POA: Unknown    V-tach (HCC) POA: Unknown    AV block, 2nd degree POA: Unknown    [facility]   Monitor    Anemia    Stable 5 mm right lung apical nodule since 7/5/2018.    Resolved Problems:  V. tach   Second-degree AV block    Hospital Summary (Brief Narrative):          64-year-old male history of CKD 4,  hypertension, bioprosthetic heart valve, brain tumor post resection, cognitive deficits admitted with acute right-sided weakness.  EEG no seizures.  MRI with findings of multiple acute infarcts.  Telemetry did not reveal A. fib or flutter.  He did have episode of nonsustained V. tach that was asymptomatic.  His beta-blocker Coreg changed to Lopressor.  He had asymptomatic episodes of second-degree AV block.  This was discussed with cardiology Dr. Escoto and was felt could continue with beta-blocker, reduce dose if recurrent with symptoms.  Cause of multiple acute strokes unclear although differential includes cardio embolic. Patient will need 30-day cardiac monitor to evaluate for possible underlying A. fib or flutter following discharge.  Recommended by neurology to continue with aspirin, statin therapy.  He has persistent gait unsteadiness using front wheel walker with 1 person assist.  Stable anemia with hemoglobin 10 range without symptoms of bleed.  Recommend outpatient follow-up.  Renal function  with creatinine stable 2.4 range prior to discharge.  Total cholesterol 124 triglycerides 79 HDL 42 LDL 6 6.  CT did show slight asymmetric of the posterior oral pharynx region.  He had no complaints of difficulty swallowing or throat symptoms.  Recommend outpatient surveillance, work-up as needed.  On day of discharge I examined patient, discussed findings, plan of care and follow-up.  Patient discharged to [facility] in stable condition.      Consultants:        Dr Art Acevedo, neurology  Dr. Hobbs, Rehab      Imaging/ Testing:      EC-ECHOCARDIOGRAM COMPLETE W/O CONT   Final Result   CONCLUSIONS  Technically difficult study - adequate information is obtained.   Left ventricular ejection fraction is visually estimated to be 55%.  Moderate concentric left ventricular hypertrophy.  Bubble study with cough appears negative.  Known bioprosthetic aortic valve that is functioning normally with   transvalvular gradients peak gradient 33 mmHg and mean gradient 18   mmHg.  Right ventricular systolic pressure is estimated to be 40 mmHg.   US-RENAL   Final Result      1.  No hydronephrosis   2.  Findings suggestive of chronic renal parenchymal disease   3.  Small BILATERAL renal cysts      CT-HEAD W/O   Final Result      No acute intracranial abnormality is identified.      Frontal encephalomalacia and advanced white matter disease is again demonstrated      Frontal cranioplasty      MR-BRAIN-W/O   Final Result      1.  Scattered punctate acute infarcts in the left frontal and parietal lobes.   2.  Small sized subacute to chronic right parieto-occipital infarct.   3.  Status post bifrontal cranioplasty.   4.  Right frontal lobe encephalomalacia which may be posttraumatic, ischemic or postsurgical in nature.   5.  Moderate diffuse cerebral substance loss.   6.  Advanced microangiopathic ischemic change versus demyelination or gliosis.   7.  Chronic ischemic pontine gliosis.   8.  Chronic bilateral cerebellar  lacunar infarcts.   9.  Multiple focal areas of old hemorrhage in the bilateral cerebellar lobes consistent with chronic hypertensive microhemorrhage.      DX-CHEST-PORTABLE (1 VIEW)   Final Result      1.  There is no acute cardiopulmonary process.   2.  Trace of left CP angle pleural fluid/pleural thickening is unchanged.      CT-CTA HEAD WITH & W/O-POST PROCESS   Final Result      1.  There is complete occlusion of the mid left A2 segment with associated atherosclerosis. There is corresponds to the abnormality seen on the perfusion exam.      Findings were discussed with DINA AGOSTO on 6/24/2019 at 12:27 PM.      CT-CTA NECK WITH & W/O-POST PROCESSING   Final Result      1.  There is atherosclerosis in both carotid bulbs and internal carotid artery origins with estimated less than 50% stenosis bilaterally.   2.  There is no evidence of carotid artery occlusion.   3.  Vertebral arteries demonstrate no dissection or occlusion.   4.  Stable 5 mm right lung apical nodule since 7/5/2018.   5.  Slight asymmetry of soft tissues in the posterior oropharynx, left greater than right. This could be inflammatory or less likely neoplastic. Follow-up with ENT on a nonemergent basis is recommended.      CT-CEREBRAL PERFUSION ANALYSIS   Final Result      1.  Cerebral blood flow less than 30% likely representing completed infarct = 0 mL.      2.  T Max more than 6 seconds likely representing combination of completed infarct and ischemia = 40 mL.      3.  Mismatched volume likely representing ischemic brain/penumbra = 40 mL      4.  Please note that the cerebral perfusion was performed on the limited brain tissue around the basal ganglia region. Infarct/ischemia outside the CT perfusion sections can be missed in this study.      CT-HEAD W/O   Final Result      1.  Cerebral atrophy.      2.  White matter lucencies most consistent with small vessel ischemic change versus demyelination or gliosis.      3.  Otherwise, Head CT  without contrast with no acute findings. No evidence of acute cerebral infarction, hemorrhage or mass lesion.            Procedures:          None     Discharge Medications:             Medication List      START taking these medications      Instructions   aspirin 81 MG Chew chewable tablet  Commonly known as:  ASA  Replaces:  Aspirin 325 MG Tbec   Take 1 Tab by mouth every day.  Dose:  81 mg     heparin 5000 UNIT/ML Soln   Inject 1 mL as instructed every 8 hours.  Dose:  5000 Units     metoprolol 25 MG Tabs  Commonly known as:  LOPRESSOR   Take 1 Tab by mouth 2 Times a Day.  Dose:  25 mg        CHANGE how you take these medications      Instructions   atorvastatin 80 MG tablet  What changed:  · medication strength  · how much to take  · when to take this  Commonly known as:  LIPITOR   Take 1 Tab by mouth every evening.  Dose:  80 mg        CONTINUE taking these medications      Instructions   amLODIPine 10 MG Tabs  Commonly known as:  NORVASC   Take 10 mg by mouth every day.  Dose:  10 mg     ferrous sulfate 325 (65 Fe) MG tablet   Take 325 mg by mouth 3 times a day.  Dose:  325 mg     finasteride 5 MG Tabs  Commonly known as:  PROSCAR   Take 5 mg by mouth every day.  Dose:  5 mg     tamsulosin 0.4 MG capsule  Commonly known as:  FLOMAX   Take 0.8 mg by mouth every day.  Dose:  0.8 mg     therapeutic multivitamin-minerals Tabs   Take 1 Tab by mouth every day.  Dose:  1 Tab        STOP taking these medications    Aspirin 325 MG Tbec  Replaced by:  aspirin 81 MG Chew chewable tablet     carvedilol 6.25 MG Tabs  Commonly known as:  COREG               Diet:       DIET ORDERS (Through next 24h)    Start     Ordered    06/24/19 2017  Diet Order Regular  ALL MEALS     Question:  Diet:  Answer:  Regular    06/24/19 2017            Activity:   As tolerated and directed by skilled nursing.      Code status:   Full code    Primary Care Provider:    Pcp Not In Computer    Follow up appointment details :      .  [facility]  for 30-day event monitoring post acute CVA within a week    [facility] in [facility] to 4 weeks      Time spent on discharge day patient visit: 40  minutes    #################################################

## 2019-07-03 NOTE — DISCHARGE INSTRUCTIONS
GO SEE YOU DOCTOR AT THE [facility] FOR YOUR STROKE. YOU WILL NEED A 30-DAY CARDIAC MONITOR.    Discharge Instructions    Discharged to other by medical transportation with escort. Discharged via wheelchair, hospital escort: Yes.  Special equipment needed: Walker    Be sure to schedule a follow-up appointment with your primary care doctor or any specialists as instructed.     Discharge Plan:   Diet Plan: Discussed  Activity Level: Discussed  Confirmed Follow up Appointment: Patient to Call and Schedule Appointment  Confirmed Symptoms Management: Discussed  Medication Reconciliation Updated: Yes    I understand that a diet low in cholesterol, fat, and sodium is recommended for good health. Unless I have been given specific instructions below for another diet, I accept this instruction as my diet prescription.   Other diet: Regular    Special Instructions:     Stroke/CVA/TIA/Hemorrhagic Ischemia Discharge Instructions  You have had a stroke. Your risk factors have been identified as follows:  Age - Over 55  Gender - Men are at a higher risk than women  Ethnicity - -Americans and Hispanics are at a higher risk  High blood pressure  Heart disease  High Cholesterol and lipids  Overweight  It is important that you reduce your risk factors to avoid another stroke in the future. Here are some general guidelines to follow:  · Eat healthy - avoid food high in fat.  · Get regular exercise.  · Maintain a healthy weight.  · Avoid smoking.  · Avoid alcohol and illegal drug use.  · Take your medications as directed.  For more information regarding risk factors, refer to pages 17-19 in your Stroke Patient Education Guide. Stroke Education Guide was given to patient.    Warning signs of a stroke include (which can also be found on page 3 of your Stroke Patient Education Guide):  · Sudden numbness of weakness of the face, arm or leg (especially on one side of the body).  · Sudden confusion, trouble speaking or  understanding.  · Sudden trouble seeing in one or both eyes.  · Sudden trouble walking, dizziness, loss of balance or coordination.  · Sudden severe headache with no known cause.  It is very important to get treatment quickly when a stroke occurs. If you experience any of the above warning signs, call [phone] immediately.     Some patients who have had a stroke will be going home on a blood thinner medication called Warfarin (Coumadin).  This medication requires very close monitoring and follow up.  This follow up can be provided by either your Primary Care Physician or by [facility] Outpatient Anticoagulation Service.  The Outpatient Anticoagulation Service is located at the [facility] at [facility] ([facility]).  If you do not know when your follow up appointment is scheduled, call [phone] to verify your appointment time.      · Is patient discharged on Warfarin / Coumadin?   No     Depression / Suicide Risk    As you are discharged from this [facility], it is important to learn how to keep safe from harming yourself.    Recognize the warning signs:  · Abrupt changes in personality, positive or negative- including increase in energy   · Giving away possessions  · Change in eating patterns- significant weight changes-  positive or negative  · Change in sleeping patterns- unable to sleep or sleeping all the time   · Unwillingness or inability to communicate  · Depression  · Unusual sadness, discouragement and loneliness  · Talk of wanting to die  · Neglect of personal appearance   · Rebelliousness- reckless behavior  · Withdrawal from people/activities they love  · Confusion- inability to concentrate     If you or a loved one observes any of these behaviors or has concerns about self-harm, here's what you can do:  · Talk about it- your feelings and reasons for harming yourself  · Remove any means that you might use to hurt yourself (examples: pills,  rope, extension cords, firearm)  · Get professional help from the community (Mental Health, Substance Abuse, psychological counseling)  · Do not be alone:Call your Safe Contact- someone whom you trust who will be there for you.  · Call your local CRISIS HOTLINE 895-9346 or 012-887-1290  · Call your local Children's Mobile Crisis Response Team [place] (752) 531-0017 or www.Sightly  · Call the toll free National Suicide Prevention Hotlines   · National Suicide Prevention Lifeline 981-842-OSOF (3554)  · National Hope Line Network 800-SUICIDE (293-7514)

## 2019-07-03 NOTE — THERAPY
"Physical Therapy Treatment completed.   Bed Mobility:  Supine to Sit: Supervised  Transfers: Sit to Stand: Minimal Assist  Gait: Level Of Assist: Minimal Assist with Front-Wheel Walker       Plan of Care: Will benefit from Physical Therapy 5 times per week  Discharge Recommendations: Equipment: Will Continue to Assess for Equipment Needs. Post-acute therapy Recommend post-acute placement for continued physical therapy services prior to discharge home. Patient can tolerate post-acute therapies at a 5x/week frequency.       See \"Rehab Therapy-Acute\" Patient Summary Report for complete documentation.     Pt continues to progress slowly w/ therapy. Pt continues to have poor insight into his deficits. Pt stating he doesn't like the FWW but doesn't realize how much he is relying on it during gait. Pt continues to take a long time to perform gait. He gets easily distracted and was very talkative causing pt to need a lot of time. Pt tends to lead w/ his L hip forward during gait causing difficulty w/ turning. Pt continues to be at a level in which he will benefit from post acute therapy.  " leg

## 2019-07-03 NOTE — DISCHARGE PLANNING
Anticipated Discharge Disposition:   [facility]    Action:    DC packet given to bedside RNJenniffer.    Left vm for Hannah Diallo with transport time today of 1030 via [employer].    DC summary faxed to Hannah Diallo [phone] per [facility].    Barriers to Discharge:    None    Plan:    DC

## 2019-07-03 NOTE — PROGRESS NOTES
[facility] Medicine Daily Progress Note    Date of Service  7/2/2019    Chief Complaint  64 y.o. male admitted 6/24/2019 with right-sided weakness.    Hospital Course        [age]-year-old male history of hypertension, bioprosthetic heart valve, brain tumor post resection admitted with acute right-sided weakness.  EEG no seizures.  MRI with findings of multiple acute infarcts.  Telemetry has been sinus rhythm.  Patient will need 30-day cardiac monitor.    Interval Problem Update    No focal weakness .  Telemetry sinus rhythm.  No additional heart block noted.  Discussed with case management-anticipate will transfer to [facility]    Consultants/Specialty    Neurology, physiatry    Code Status  Full code    Disposition    [facility]    Review of Systems  Review of Systems   Constitutional: Negative for chills, diaphoresis, fever and malaise/fatigue.   HENT: Negative for congestion.    Respiratory: Negative for cough, shortness of breath, wheezing and stridor.    Cardiovascular: Negative for chest pain and leg swelling.   Gastrointestinal: Negative for abdominal pain, diarrhea and vomiting.   Musculoskeletal: Negative for back pain, joint pain and neck pain.   Neurological: Negative for tingling, sensory change, focal weakness and weakness.        Physical Exam  Temp:  [36.3 °C (97.4 °F)-36.9 °C (98.4 °F)] 36.8 °C (98.3 °F)  Pulse:  [88-98] 90  Resp:  [16-19] 16  BP: (127-165)/() 128/72  SpO2:  [90 %-100 %] 100 %    Physical Exam   Constitutional: He is oriented to person, place, and time. No distress.   HENT:   Nose: Nose normal.   Frontal craniotomy scar   Eyes: Conjunctivae and EOM are normal. No scleral icterus.   Neck: Normal range of motion. No JVD present.   Cardiovascular: Normal rate and regular rhythm.    No murmur heard.  Pulmonary/Chest: Effort normal. No stridor. No respiratory distress. He has no wheezes. He has no rales.   Abdominal: Soft. Bowel sounds are  normal. He exhibits no distension. There is no tenderness.   Musculoskeletal: He exhibits no edema or tenderness.   Neurological: He is alert and oriented to person, place, and time.   Speech mildly delayed   no gross focal weakness   Skin: Skin is warm and dry. He is not diaphoretic. No pallor.   Vitals reviewed.      Fluids    Intake/Output Summary (Last 24 hours) at 07/02/19 1819  Last data filed at 07/02/19 1545   Gross per 24 hour   Intake              960 ml   Output             1250 ml   Net             -290 ml       Laboratory  Recent Labs      06/30/19   0520   WBC  7.0   RBC  3.77*   HEMOGLOBIN  10.3*   HEMATOCRIT  32.0*   MCV  84.9   MCH  27.3   MCHC  32.2*   RDW  46.5   PLATELETCT  149*   MPV  11.6     Recent Labs      06/30/19   0520  07/01/19   1438   SODIUM  138  138   POTASSIUM  4.7  4.6   CHLORIDE  112  108   CO2  20  21   GLUCOSE  115*  91   BUN  56*  54*   CREATININE  2.43*  2.40*   CALCIUM  9.0  9.5                   Imaging  EC-ECHOCARDIOGRAM COMPLETE W/O CONT   Final Result      US-RENAL   Final Result      1.  No hydronephrosis   2.  Findings suggestive of chronic renal parenchymal disease   3.  Small BILATERAL renal cysts      CT-HEAD W/O   Final Result      No acute intracranial abnormality is identified.      Frontal encephalomalacia and advanced white matter disease is again demonstrated      Frontal cranioplasty      MR-BRAIN-W/O   Final Result      1.  Scattered punctate acute infarcts in the left frontal and parietal lobes.   2.  Small sized subacute to chronic right parieto-occipital infarct.   3.  Status post bifrontal cranioplasty.   4.  Right frontal lobe encephalomalacia which may be posttraumatic, ischemic or postsurgical in nature.   5.  Moderate diffuse cerebral substance loss.   6.  Advanced microangiopathic ischemic change versus demyelination or gliosis.   7.  Chronic ischemic pontine gliosis.   8.  Chronic bilateral cerebellar lacunar infarcts.   9.  Multiple focal areas of  old hemorrhage in the bilateral cerebellar lobes consistent with chronic hypertensive microhemorrhage.      DX-CHEST-PORTABLE (1 VIEW)   Final Result      1.  There is no acute cardiopulmonary process.   2.  Trace of left CP angle pleural fluid/pleural thickening is unchanged.      CT-CTA HEAD WITH & W/O-POST PROCESS   Final Result      1.  There is complete occlusion of the mid left A2 segment with associated atherosclerosis. There is corresponds to the abnormality seen on the perfusion exam.      Findings were discussed with DINA AGOSTO on 6/24/2019 at 12:27 PM.      CT-CTA NECK WITH & W/O-POST PROCESSING   Final Result      1.  There is atherosclerosis in both carotid bulbs and internal carotid artery origins with estimated less than 50% stenosis bilaterally.   2.  There is no evidence of carotid artery occlusion.   3.  Vertebral arteries demonstrate no dissection or occlusion.   4.  Stable 5 mm right lung apical nodule since 7/5/2018.   5.  Slight asymmetry of soft tissues in the posterior oropharynx, left greater than right. This could be inflammatory or less likely neoplastic. Follow-up with ENT on a nonemergent basis is recommended.      CT-CEREBRAL PERFUSION ANALYSIS   Final Result      1.  Cerebral blood flow less than 30% likely representing completed infarct = 0 mL.      2.  T Max more than 6 seconds likely representing combination of completed infarct and ischemia = 40 mL.      3.  Mismatched volume likely representing ischemic brain/penumbra = 40 mL      4.  Please note that the cerebral perfusion was performed on the limited brain tissue around the basal ganglia region. Infarct/ischemia outside the CT perfusion sections can be missed in this study.      CT-HEAD W/O   Final Result      1.  Cerebral atrophy.      2.  White matter lucencies most consistent with small vessel ischemic change versus demyelination or gliosis.      3.  Otherwise, Head CT without contrast with no acute findings. No evidence  "of acute cerebral infarction, hemorrhage or mass lesion.           Assessment/Plan  * CVA (cerebral vascular accident) (HCC)- (present on admission)   Assessment & Plan    MRI brain showed embolic strokes  Started on aspirin, statin, dvt prophylaxis.  LDL 66  A1c 6.1%, prediabetes education ordered  TTE done, negative bubble study, no thrombus seen  Placed on telemetry to monitor for arrhythmias.  Continue with PT/OT  Will need 30-day cardiac event monitoring [facility]  Post permissive hypertension periods-continue Norvasc, beta-blocker-monitor blood pressure response         AV block, 2nd degree   Assessment & Plan    Had 2 occasions, skipping only 1 complex.  Reported asymptomatic.   Case was discussed with cardiology Dr. Escoto--recommended continue to monitor telemetry for symptomatic block.  Will decrease Lopressor dose if recurrent.      V-tach ([facility])   Assessment & Plan    Nonsustained, asymptomatic.  Coreg changed to Lopressor  No more episodes of VT-monitor telemetry     Stage 4 chronic kidney disease (HCC)   Assessment & Plan    [facility] shows that his creatinine has been greater than 2  UA showed protein  Renal US medical renal kidney disease  Avoid nephrotoxic medications, renally dose meds  Creatinine stable     Anemia- (present on admission)   Assessment & Plan    Vit B12 and ferritin normal.  No acute symptoms of bleeding  Retic is low  Outpatient followup     H/O aortic valve replacement- (present on admission)   Assessment & Plan    \"Cow\" valve per patient, placed by [facility]  Normal functioning bioprosthetic aortic valve on TTE     Brain tumor (HCC)- (present on admission)   Assessment & Plan    History of resection many years ago  Has some cognitive delay       History of seizure- (present on admission)   Assessment & Plan    EEG negative for seizure     Essential hypertension- (present on admission)   Assessment & Plan    Carvedilol switched to " metoprolol as patient was having nonsustained VT on maximal dose of carvedilol 25 twice daily  Continue Norvasc  Monitor blood pressure       Cardiomyopathy (HCC)- (present on admission)   Assessment & Plan    TTE here showed EF 55%  Carvedilol switch to metoprolol  tachycardia or episodes of V. Tach  Monitor telemetry          VTE prophylaxis: Heparin subcu    Discussed the multidisciplinary team plan of care.

## 2019-07-03 NOTE — PROGRESS NOTES
[facility] picked up patient via wheelchair at 1050.

## 2019-07-25 NOTE — ASSESSMENT & PLAN NOTE
Vit B12 and ferritin normal.  No acute symptoms of bleeding  Retic is low  Outpatient followup   DISCHARGE

## 2019-10-19 NOTE — ED NOTES
EEG in room  
EEG still at bedside  
Med Rec complete per [facility]  Allergies Reviewed  No ABX filled in the last 14 days    
Pt answered MRI questions, states that no metal in body other than mesh in forehead.   
Pt back from MRI   
Pt medicated per MAR, urine collected and sent to lab.   
Pt passed swallow evaluation.   
Pt resting in bed eating.   
Pt to MRI  
RN from floor called, stated that room still needs to be cleaned.   
Update given to receiving RN. Pt to floor with ER RN.   
None

## 2021-03-03 DIAGNOSIS — Z23 NEED FOR VACCINATION: ICD-10-CM

## 2021-12-30 ENCOUNTER — APPOINTMENT (OUTPATIENT)
Dept: RADIOLOGY | Facility: MEDICAL CENTER | Age: 66
End: 2021-12-30
Attending: EMERGENCY MEDICINE
Payer: COMMERCIAL

## 2021-12-30 ENCOUNTER — HOSPITAL ENCOUNTER (EMERGENCY)
Facility: MEDICAL CENTER | Age: 66
End: 2021-12-30
Attending: EMERGENCY MEDICINE
Payer: COMMERCIAL

## 2021-12-30 VITALS
HEART RATE: 94 BPM | SYSTOLIC BLOOD PRESSURE: 148 MMHG | TEMPERATURE: 98 F | BODY MASS INDEX: 40.6 KG/M2 | HEIGHT: 71 IN | RESPIRATION RATE: 16 BRPM | OXYGEN SATURATION: 91 % | DIASTOLIC BLOOD PRESSURE: 92 MMHG | WEIGHT: 290 LBS

## 2021-12-30 DIAGNOSIS — S91.302A WOUND OF LEFT FOOT: ICD-10-CM

## 2021-12-30 DIAGNOSIS — L03.116 CELLULITIS OF LEFT FOOT: ICD-10-CM

## 2021-12-30 LAB
ALBUMIN SERPL BCP-MCNC: 3.7 G/DL (ref 3.2–4.9)
ALBUMIN/GLOB SERPL: 0.7 G/DL
ALP SERPL-CCNC: 149 U/L (ref 30–99)
ALT SERPL-CCNC: 12 U/L (ref 2–50)
ANION GAP SERPL CALC-SCNC: 11 MMOL/L (ref 7–16)
APPEARANCE UR: CLEAR
AST SERPL-CCNC: 12 U/L (ref 12–45)
BACTERIA #/AREA URNS HPF: NEGATIVE /HPF
BASOPHILS # BLD AUTO: 0.8 % (ref 0–1.8)
BASOPHILS # BLD: 0.05 K/UL (ref 0–0.12)
BILIRUB SERPL-MCNC: 0.2 MG/DL (ref 0.1–1.5)
BILIRUB UR QL STRIP.AUTO: NEGATIVE
BUN SERPL-MCNC: 42 MG/DL (ref 8–22)
CALCIUM SERPL-MCNC: 9.3 MG/DL (ref 8.5–10.5)
CHLORIDE SERPL-SCNC: 108 MMOL/L (ref 96–112)
CO2 SERPL-SCNC: 19 MMOL/L (ref 20–33)
COLOR UR: YELLOW
CREAT SERPL-MCNC: 2.67 MG/DL (ref 0.5–1.4)
EOSINOPHIL # BLD AUTO: 0.21 K/UL (ref 0–0.51)
EOSINOPHIL NFR BLD: 3.2 % (ref 0–6.9)
EPI CELLS #/AREA URNS HPF: NEGATIVE /HPF
ERYTHROCYTE [DISTWIDTH] IN BLOOD BY AUTOMATED COUNT: 51.9 FL (ref 35.9–50)
GLOBULIN SER CALC-MCNC: 5.1 G/DL (ref 1.9–3.5)
GLUCOSE SERPL-MCNC: 120 MG/DL (ref 65–99)
GLUCOSE UR STRIP.AUTO-MCNC: NEGATIVE MG/DL
HCT VFR BLD AUTO: 36.7 % (ref 42–52)
HGB BLD-MCNC: 11.4 G/DL (ref 14–18)
HYALINE CASTS #/AREA URNS LPF: ABNORMAL /LPF
IMM GRANULOCYTES # BLD AUTO: 0.13 K/UL (ref 0–0.11)
IMM GRANULOCYTES NFR BLD AUTO: 2 % (ref 0–0.9)
KETONES UR STRIP.AUTO-MCNC: NEGATIVE MG/DL
LACTATE BLD-SCNC: 1.4 MMOL/L (ref 0.5–2)
LEUKOCYTE ESTERASE UR QL STRIP.AUTO: NEGATIVE
LYMPHOCYTES # BLD AUTO: 1.34 K/UL (ref 1–4.8)
LYMPHOCYTES NFR BLD: 20.4 % (ref 22–41)
MCH RBC QN AUTO: 27.3 PG (ref 27–33)
MCHC RBC AUTO-ENTMCNC: 31.1 G/DL (ref 33.7–35.3)
MCV RBC AUTO: 87.8 FL (ref 81.4–97.8)
MICRO URNS: ABNORMAL
MONOCYTES # BLD AUTO: 0.83 K/UL (ref 0–0.85)
MONOCYTES NFR BLD AUTO: 12.6 % (ref 0–13.4)
NEUTROPHILS # BLD AUTO: 4.01 K/UL (ref 1.82–7.42)
NEUTROPHILS NFR BLD: 61 % (ref 44–72)
NITRITE UR QL STRIP.AUTO: NEGATIVE
NRBC # BLD AUTO: 0 K/UL
NRBC BLD-RTO: 0 /100 WBC
PH UR STRIP.AUTO: 5 [PH] (ref 5–8)
PLATELET # BLD AUTO: 171 K/UL (ref 164–446)
PMV BLD AUTO: 11.8 FL (ref 9–12.9)
POTASSIUM SERPL-SCNC: 5.1 MMOL/L (ref 3.6–5.5)
PROT SERPL-MCNC: 8.8 G/DL (ref 6–8.2)
PROT UR QL STRIP: 100 MG/DL
RBC # BLD AUTO: 4.18 M/UL (ref 4.7–6.1)
RBC # URNS HPF: ABNORMAL /HPF
RBC UR QL AUTO: NEGATIVE
SODIUM SERPL-SCNC: 138 MMOL/L (ref 135–145)
SP GR UR STRIP.AUTO: 1.01
UROBILINOGEN UR STRIP.AUTO-MCNC: 0.2 MG/DL
WBC # BLD AUTO: 6.6 K/UL (ref 4.8–10.8)
WBC #/AREA URNS HPF: ABNORMAL /HPF

## 2021-12-30 PROCEDURE — A9270 NON-COVERED ITEM OR SERVICE: HCPCS | Performed by: EMERGENCY MEDICINE

## 2021-12-30 PROCEDURE — 99285 EMERGENCY DEPT VISIT HI MDM: CPT

## 2021-12-30 PROCEDURE — 87040 BLOOD CULTURE FOR BACTERIA: CPT

## 2021-12-30 PROCEDURE — 71045 X-RAY EXAM CHEST 1 VIEW: CPT

## 2021-12-30 PROCEDURE — 87086 URINE CULTURE/COLONY COUNT: CPT

## 2021-12-30 PROCEDURE — 83605 ASSAY OF LACTIC ACID: CPT

## 2021-12-30 PROCEDURE — 81001 URINALYSIS AUTO W/SCOPE: CPT

## 2021-12-30 PROCEDURE — 80053 COMPREHEN METABOLIC PANEL: CPT

## 2021-12-30 PROCEDURE — 85025 COMPLETE CBC W/AUTO DIFF WBC: CPT

## 2021-12-30 PROCEDURE — 700102 HCHG RX REV CODE 250 W/ 637 OVERRIDE(OP): Performed by: EMERGENCY MEDICINE

## 2021-12-30 PROCEDURE — 73630 X-RAY EXAM OF FOOT: CPT | Mod: LT

## 2021-12-30 RX ORDER — SULFAMETHOXAZOLE AND TRIMETHOPRIM 800; 160 MG/1; MG/1
1 TABLET ORAL ONCE
Status: COMPLETED | OUTPATIENT
Start: 2021-12-30 | End: 2021-12-30

## 2021-12-30 RX ORDER — CEPHALEXIN 500 MG/1
500 CAPSULE ORAL ONCE
Status: COMPLETED | OUTPATIENT
Start: 2021-12-30 | End: 2021-12-30

## 2021-12-30 RX ORDER — SULFAMETHOXAZOLE AND TRIMETHOPRIM 800; 160 MG/1; MG/1
1 TABLET ORAL 2 TIMES DAILY
Qty: 20 TABLET | Refills: 0 | Status: SHIPPED | OUTPATIENT
Start: 2021-12-30 | End: 2023-10-11

## 2021-12-30 RX ORDER — CEPHALEXIN 500 MG/1
500 CAPSULE ORAL 4 TIMES DAILY
Qty: 40 CAPSULE | Refills: 0 | Status: SHIPPED | OUTPATIENT
Start: 2021-12-30 | End: 2022-03-07

## 2021-12-30 RX ORDER — SULFAMETHOXAZOLE AND TRIMETHOPRIM 800; 160 MG/1; MG/1
1 TABLET ORAL 2 TIMES DAILY
Qty: 20 TABLET | Refills: 0 | Status: SHIPPED | OUTPATIENT
Start: 2021-12-30 | End: 2022-01-09

## 2021-12-30 RX ORDER — CEPHALEXIN 500 MG/1
500 CAPSULE ORAL 4 TIMES DAILY
Qty: 40 CAPSULE | Refills: 0 | Status: SHIPPED | OUTPATIENT
Start: 2021-12-30 | End: 2022-01-09

## 2021-12-30 RX ADMIN — SULFAMETHOXAZOLE AND TRIMETHOPRIM 1 TABLET: 800; 160 TABLET ORAL at 22:24

## 2021-12-30 RX ADMIN — CEPHALEXIN 500 MG: 500 CAPSULE ORAL at 22:24

## 2021-12-31 NOTE — DISCHARGE PLANNING
Medical Social Work     SW received a call from the RN requesting SW assistance with [facility] transport back to the pt group home. The pt lives at [address] [place] [address].     [facility] faxed a PCS from to [facility] and set up transport with Jose Alberto for 2330. RN aware of transport time.

## 2021-12-31 NOTE — DISCHARGE PLANNING
Medical Social Work     Correction to the pt address: The pt lives at [address] [place] [address]. SW advised [facility] of the correct address and resent the PCS form.     Care home: [address] [place] [address].    Care home phone number is [phone]

## 2021-12-31 NOTE — ED TRIAGE NOTES
"Chief Complaint   Patient presents with   • Wound Infection     PT BIBA from group home for L foot infection. Pt has open and weaping wound to  L anterior surface of foot that has been there for approx 1.5 months. Pt denies any fevers, body ahces, or chills and is not diabetic.       EMS to triage for above complaint.   Educated on triage process, encourage to inform staff of any changes.     /103   Pulse 87   Temp 36.3 °C (97.4 °F) (Temporal)   Resp 16   Ht 1.803 m (5' 11\")   Wt (!) 132 kg (290 lb)   SpO2 100%   BMI 40.45 kg/m²     "

## 2021-12-31 NOTE — ED NOTES
"Pt discharged home via [facility].  Report to AUDIE Zendejas. Pt in possession of belongings. Pt provided discharge education and information pertaining to medications and follow up appointments. Pt received copy of discharge instructions and verbalized understanding. /92   Pulse 94   Temp 36.7 °C (98 °F) (Temporal)   Resp 16   Ht 1.803 m (5' 11\")   Wt (!) 132 kg (290 lb)   SpO2 91%   BMI 40.45 kg/m²   "

## 2021-12-31 NOTE — ED PROVIDER NOTES
"ED Provider Note    CHIEF COMPLAINT  Chief Complaint   Patient presents with   • Wound Infection     PT BIBA from group home for L foot infection. Pt has open and weaping wound to  L anterior surface of foot that has been there for approx 1.5 months. Pt denies any fevers, body ahces, or chills and is not diabetic.       HPI  Brannon Silver is a 66 y.o. male who is sent in by his group home for evaluation of a wound to his left foot has been present for the past 6 weeks.  He denies any new weakness or numbness but states he does have decreased sensation in both of his feet.  No fever.  No injuries that he knows of.  He is not diabetic.  History of hypertension hypercholesterolemia.  No nausea or vomiting.  He offers no other specific complaints    REVIEW OF SYSTEMS  Negative for fever, rash, chest pain, dyspnea, abdominal pain, nausea, vomiting, diarrhea, headache, back pain. All other systems are negative.     PAST MEDICAL HISTORY  Past Medical History:   Diagnosis Date   • History of brain cancer    • Hypertension    • Valvular heart disease        FAMILY HISTORY  Family History   Problem Relation Age of Onset   • Heart Disease Neg Hx        SOCIAL HISTORY  Social History     Tobacco Use   • Smoking status: Former Smoker     Quit date: 3/26/2008     Years since quittin.7   • Smokeless tobacco: Never Used   Substance Use Topics   • Alcohol use: Not on file   • Drug use: Not on file       SURGICAL HISTORY  Past Surgical History:   Procedure Laterality Date   • CRANIOTOMY         CURRENT MEDICATIONS  I personally reviewed the medication list in the charting documentation.     ALLERGIES  Allergies   Allergen Reactions   • Pcn [Penicillins]        MEDICAL RECORD  I have reviewed patient's medical record and pertinent results are listed above.      PHYSICAL EXAM  VITAL SIGNS: /95   Pulse 85   Temp 36.3 °C (97.4 °F) (Temporal)   Resp 16   Ht 1.803 m (5' 11\")   Wt (!) 132 kg (290 lb)   SpO2 95%   BMI " 40.45 kg/m²    Constitutional: Chronically ill-appearing, awake and alert, not acutely toxic  HENT: Normocephalic, no obvious evidence of acute trauma.  Eyes: No scleral icterus. Normal conjunctiva   Neck: Comfortable movement without any obvious restriction in the range of motion.  Cardiovascular: Upon ascultation I appreciate a regular heart rhythm and a normal rate.   Thorax & Lungs: Normal nonlabored respirations.  Upon application of the stethoscope for auscultation I find there to be no associated chest wall tenderness.  I appreciate no wheezing, rhonchi or rales. There is normal air movement.    Abdomen: The abdomen is not visibly distended. Upon palpation, I find it to be without tenderness.  No mass appreciated.  Skin: The exposed portions of skin reveal no obvious rash or other abnormalities.  Extremities/Musculoskeletal: Chronic stasis dermatitis of both lower extremities.  Inspection of both feet reveals edema, faintly palpable dorsalis pedis pulses bilaterally.  Feet are warm.  Sensation intact.  Open wound with minimal purulent discharge to the dorsum of the left foot.    DIAGNOSTIC STUDIES / PROCEDURES    LABS/EKGs     Results for orders placed or performed during the hospital encounter of 12/30/21   Lactic acid (lactate)   Result Value Ref Range    Lactic Acid 1.4 0.5 - 2.0 mmol/L   CBC WITH DIFFERENTIAL   Result Value Ref Range    WBC 6.6 4.8 - 10.8 K/uL    RBC 4.18 (L) 4.70 - 6.10 M/uL    Hemoglobin 11.4 (L) 14.0 - 18.0 g/dL    Hematocrit 36.7 (L) 42.0 - 52.0 %    MCV 87.8 81.4 - 97.8 fL    MCH 27.3 27.0 - 33.0 pg    MCHC 31.1 (L) 33.7 - 35.3 g/dL    RDW 51.9 (H) 35.9 - 50.0 fL    Platelet Count 171 164 - 446 K/uL    MPV 11.8 9.0 - 12.9 fL    Neutrophils-Polys 61.00 44.00 - 72.00 %    Lymphocytes 20.40 (L) 22.00 - 41.00 %    Monocytes 12.60 0.00 - 13.40 %    Eosinophils 3.20 0.00 - 6.90 %    Basophils 0.80 0.00 - 1.80 %    Immature Granulocytes 2.00 (H) 0.00 - 0.90 %    Nucleated RBC 0.00 /100 WBC     Neutrophils (Absolute) 4.01 1.82 - 7.42 K/uL    Lymphs (Absolute) 1.34 1.00 - 4.80 K/uL    Monos (Absolute) 0.83 0.00 - 0.85 K/uL    Eos (Absolute) 0.21 0.00 - 0.51 K/uL    Baso (Absolute) 0.05 0.00 - 0.12 K/uL    Immature Granulocytes (abs) 0.13 (H) 0.00 - 0.11 K/uL    NRBC (Absolute) 0.00 K/uL   COMP METABOLIC PANEL   Result Value Ref Range    Sodium 138 135 - 145 mmol/L    Potassium 5.1 3.6 - 5.5 mmol/L    Chloride 108 96 - 112 mmol/L    Co2 19 (L) 20 - 33 mmol/L    Anion Gap 11.0 7.0 - 16.0    Glucose 120 (H) 65 - 99 mg/dL    Bun 42 (H) 8 - 22 mg/dL    Creatinine 2.67 (H) 0.50 - 1.40 mg/dL    Calcium 9.3 8.5 - 10.5 mg/dL    AST(SGOT) 12 12 - 45 U/L    ALT(SGPT) 12 2 - 50 U/L    Alkaline Phosphatase 149 (H) 30 - 99 U/L    Total Bilirubin 0.2 0.1 - 1.5 mg/dL    Albumin 3.7 3.2 - 4.9 g/dL    Total Protein 8.8 (H) 6.0 - 8.2 g/dL    Globulin 5.1 (H) 1.9 - 3.5 g/dL    A-G Ratio 0.7 g/dL   URINALYSIS    Specimen: Urine   Result Value Ref Range    Color Yellow     Character Clear     Specific Gravity 1.014 <1.035    Ph 5.0 5.0 - 8.0    Glucose Negative Negative mg/dL    Ketones Negative Negative mg/dL    Protein 100 (A) Negative mg/dL    Bilirubin Negative Negative    Urobilinogen, Urine 0.2 Negative    Nitrite Negative Negative    Leukocyte Esterase Negative Negative    Occult Blood Negative Negative    Micro Urine Req Microscopic    Blood Culture    Specimen: Blood   Result Value Ref Range    Significant Indicator NEG     Source BLD     Site -     Culture Result -    Blood Culture    Specimen: Blood   Result Value Ref Range    Significant Indicator NEG     Source BLD     Site Peripheral     Culture Result -    ESTIMATED GFR   Result Value Ref Range    GFR If  29 (A) >60 mL/min/1.73 m 2    GFR If Non  24 (A) >60 mL/min/1.73 m 2   URINE MICROSCOPIC (W/UA)   Result Value Ref Range    WBC 0-2 (A) /hpf    RBC 0-2 (A) /hpf    Bacteria Negative None /hpf    Epithelial Cells Negative /hpf     Hyaline Cast 0-2 /lpf        RADIOLOGY  DX-FOOT-COMPLETE 3+ LEFT   Final Result      There is soft tissue swelling along the dorsal surface of the foot with superficial subcutaneous emphysema or a open wound. No radiographic findings of osteomyelitis.      DX-CHEST-PORTABLE (1 VIEW)   Final Result      1.  Cardiomegaly with widening of the superior mediastinum which is new in appearance from 6/24/2019.   2.  Pulmonary edema with trace left effusion.            COURSE & MEDICAL DECISION MAKING  I have reviewed any medical record information, laboratory studies and radiographic results as noted above.    Encounter Summary: This is a very pleasant 66 y.o. male who unfortunately required evaluation in the emergency department today with a left foot cellulitis and open wound.  This has been there apparently for 6 weeks.  He has a history of hypertension and hypercholesterolemia, his exam is consistent with peripheral vascular disease.  He appears well overall, is not tachycardic, not febrile.  Will follow up on the triage ordered blood work, will obtain an x-ray of the foot, he will be reevaluated ------ blood work reveals a normal WBC.  Chronic renal disease.  No other acute findings.  The x-ray does not reveal evidence of osteomyelitis.  At this point he will be treated with antibiotics, Keflex and Bactrim given the purulent discharge.  He will be referred to the wound care center and I have put in an order for ED follow-up assistance to help facilitate this follow-up.  Return instructions provided, he is being discharged home in stable condition      DISPOSITION: Discharged home in stable condition      FINAL IMPRESSION  1. Wound of left foot    2. Cellulitis of left foot           This dictation was created using voice recognition software. The accuracy of the dictation is limited to the abilities of the software. I expect there may be some errors of grammar and possibly content. The nursing notes were reviewed and  certain aspects of this information were incorporated into this note.    Electronically signed by: Rambo Stanley M.D., 12/30/2021 8:53 PM

## 2022-01-01 LAB
BACTERIA UR CULT: NORMAL
SIGNIFICANT IND 70042: NORMAL
SITE SITE: NORMAL
SOURCE SOURCE: NORMAL

## 2022-01-04 LAB
BACTERIA BLD CULT: NORMAL
BACTERIA BLD CULT: NORMAL
SIGNIFICANT IND 70042: NORMAL
SIGNIFICANT IND 70042: NORMAL
SITE SITE: NORMAL
SITE SITE: NORMAL
SOURCE SOURCE: NORMAL
SOURCE SOURCE: NORMAL

## 2022-03-07 ENCOUNTER — HOSPITAL ENCOUNTER (EMERGENCY)
Facility: MEDICAL CENTER | Age: 67
End: 2022-03-07
Attending: EMERGENCY MEDICINE
Payer: COMMERCIAL

## 2022-03-07 VITALS
HEART RATE: 75 BPM | RESPIRATION RATE: 16 BRPM | BODY MASS INDEX: 40.6 KG/M2 | WEIGHT: 290 LBS | DIASTOLIC BLOOD PRESSURE: 89 MMHG | HEIGHT: 71 IN | SYSTOLIC BLOOD PRESSURE: 147 MMHG | OXYGEN SATURATION: 99 % | TEMPERATURE: 97 F

## 2022-03-07 DIAGNOSIS — L97.522 CHRONIC FOOT ULCER, LEFT, WITH FAT LAYER EXPOSED (HCC): ICD-10-CM

## 2022-03-07 PROCEDURE — 700102 HCHG RX REV CODE 250 W/ 637 OVERRIDE(OP): Performed by: EMERGENCY MEDICINE

## 2022-03-07 PROCEDURE — 99285 EMERGENCY DEPT VISIT HI MDM: CPT

## 2022-03-07 PROCEDURE — A9270 NON-COVERED ITEM OR SERVICE: HCPCS | Performed by: EMERGENCY MEDICINE

## 2022-03-07 RX ORDER — IBUPROFEN 600 MG/1
600 TABLET ORAL ONCE
Status: COMPLETED | OUTPATIENT
Start: 2022-03-07 | End: 2022-03-07

## 2022-03-07 RX ORDER — ACETAMINOPHEN 325 MG/1
650 TABLET ORAL ONCE
Status: COMPLETED | OUTPATIENT
Start: 2022-03-07 | End: 2022-03-07

## 2022-03-07 RX ADMIN — IBUPROFEN 600 MG: 600 TABLET ORAL at 13:55

## 2022-03-07 RX ADMIN — ACETAMINOPHEN 650 MG: 325 TABLET, FILM COATED ORAL at 13:56

## 2022-03-07 ASSESSMENT — FIBROSIS 4 INDEX: FIB4 SCORE: 1.36

## 2022-03-07 NOTE — DISCHARGE PLANNING
Pt has transportation booked to go home via [facility] for 6-6:30 pm. Discharge address is [address] [place] [id number]     submitted approved services for transport.     Trip #[id number]     Cost is $95.28    TIANA Bearden is aware of pt's transport time.

## 2022-03-07 NOTE — ED PROVIDER NOTES
ED Provider Note    CHIEF COMPLAINT  Chief Complaint   Patient presents with   • Foot Ulcer   • Wound Check     Pt c/o left foot wound x 2 months        HPI  Brannon Silver is a 67 y.o. male with a history of brain cancer status post craniotomy with resection who still has good functional capacity but resides in a group home stating that he has had a chronic left foot ulcer and was sent over from his group home for evaluation.  There is been no fever, chills, sweats.  He has a bit of increased discomfort but there is no redness or swelling and he has not been to wound care or referred    REVIEW OF SYSTEMS  See HPI for further details. All other systems are negative.     PAST MEDICAL HISTORY  Past Medical History:   Diagnosis Date   • History of brain cancer    • Hypertension    • Valvular heart disease        FAMILY HISTORY  Family History   Problem Relation Age of Onset   • Heart Disease Neg Hx        SOCIAL HISTORY   reports that he quit smoking about 13 years ago. He has never used smokeless tobacco.    SURGICAL HISTORY  Past Surgical History:   Procedure Laterality Date   • CRANIOTOMY         CURRENT MEDICATIONS  Home Medications     Reviewed by Tova Gleason R.N. (Registered Nurse) on 03/07/22 at 1200  Med List Status: Unable to Obtain   Medication Last Dose Status   amLODIPine (NORVASC) 10 MG Tab 3/7/2022 Active   aspirin (ASA) 81 MG Chew Tab chewable tablet 3/7/2022 Active   atorvastatin (LIPITOR) 80 MG tablet 3/6/2022 Active   ferrous sulfate 325 (65 Fe) MG tablet 3/7/2022 Active   finasteride (PROSCAR) 5 MG Tab  Active   heparin 5000 UNIT/ML Solution  Active   metoprolol (LOPRESSOR) 25 MG Tab 3/7/2022 Active   sulfamethoxazole-trimethoprim (BACTRIM DS) 800-160 MG tablet  Active   tamsulosin (FLOMAX) 0.4 MG capsule 3/7/2022 Active   therapeutic multivitamin-minerals (THERAGRAN-M) Tab 3/7/2022 Active                ALLERGIES  Allergies   Allergen Reactions   • Pcn [Penicillins]        PHYSICAL  "EXAM  VITAL SIGNS: /91   Pulse 72   Temp 35.9 °C (96.6 °F) (Temporal)   Resp 16   Ht 1.803 m (5' 11\")   Wt (!) 132 kg (290 lb)   SpO2 98%   BMI 40.45 kg/m²    Constitutional: Well developed, Well nourished, No acute distress, Non-toxic appearance.  Slow speech with mild dysarthria  HENT: Status post craniotomy with scars consistent with history, Atraumatic, Bilateral external ears normal, Oropharynx is clear mucous membranes are moist. No oral exudates or nasal discharge.   Eyes: Pupils are equal round and reactive, EOMI, Conjunctiva normal, No discharge.   Neck: Normal range of motion, No tenderness, Supple, No stridor. No meningismus.  Lymphatic: No lymphadenopathy noted.   Cardiovascular: Regular rate and rhythm without murmur rub or gallop.  Thorax & Lungs: Clear breath sounds bilaterally without wheezes, rhonchi or rales. There is no chest wall tenderness.   Abdomen: Soft non-tender non-distended. There is no rebound or guarding. No organomegaly is appreciated. Bowel sounds are normal.  Skin: 2 x 3 cm area dorsal left foot ulceration down to and fat layer but no evidence of exposed bone without rash.  No evidence of cellulitis or erythema and minimal tenderness  Back: No CVA or spinal tenderness.   Extremities: Intact distal pulses, No edema, No tenderness, No cyanosis, No clubbing. Capillary refill is less than 2 seconds.  Musculoskeletal: Good range of motion in all major joints. No tenderness to palpation or major deformities noted.   Neurologic: Alert & oriented x 3, Normal motor function, Normal sensory function, No focal deficits noted. Reflexes are normal.  Psychiatric: Affect normal, Judgment normal, Mood normal. There is no suicidal ideation or patient reported hallucinations.       COURSE & MEDICAL DECISION MAKING  Pertinent Labs & Imaging studies reviewed. (See chart for details)  Patient has a chronic left foot ulcer with no evidence of cellulitis on examination and he has normal vital " signs.  I doubt osteomyelitis and imaging is not obtained as this would not be helpful.  I think he needs to be referred to the [facility] which I have done and he is given Motrin and Tylenol for discomfort discharged in stable condition will return if any significant change in symptoms    FINAL IMPRESSION  1. Chronic foot ulcer, left, with fat layer exposed (HCC)             Electronically signed by: Reyes Zendejas M.D., 3/7/2022 1:48 PM

## 2022-03-07 NOTE — ED TRIAGE NOTES
Pt to triage .  Chief Complaint   Patient presents with   • Foot Ulcer   • Wound Check     Pt c/o left foot wound x 2 months

## 2022-03-08 NOTE — ED NOTES
Pt Transport ([facility]) at bedside to transfer pt back to residence. Pt signed D/C paperwork, POC explained to transfer staff and D/C paperwork transferred w/pt.

## 2022-08-29 NOTE — CARE PLAN
Problem: Safety  Goal: Will remain free from injury  Outcome: PROGRESSING AS EXPECTED  Patient educated on call light use, call light in reach, bed in low position, bed alarm on,    Problem: Safety:  Goal: Will remain free from injury  Outcome: PROGRESSING AS EXPECTED  Patient educated on call light use, call light in reach, bed in low position, bed alarm on,       Positive

## 2023-09-23 NOTE — ASSESSMENT & PLAN NOTE
Follow up with your primary care provider. I refilled your metformin and wrote you a prescription for zofran. Return to the ER for chest pain or shortness of breath. Had 2 occasions, skipping only 1 complex.  Reported asymptomatic.   Case was discussed with cardiology Dr. Escoto--recommended continue to monitor telemetry for symptomatic block.  Will decrease Lopressor dose if recurrent.

## 2023-10-11 ENCOUNTER — APPOINTMENT (OUTPATIENT)
Dept: RADIOLOGY | Facility: MEDICAL CENTER | Age: 68
End: 2023-10-11
Attending: EMERGENCY MEDICINE
Payer: COMMERCIAL

## 2023-10-11 ENCOUNTER — HOSPITAL ENCOUNTER (OUTPATIENT)
Facility: MEDICAL CENTER | Age: 68
End: 2023-10-18
Attending: EMERGENCY MEDICINE | Admitting: INTERNAL MEDICINE
Payer: COMMERCIAL

## 2023-10-11 DIAGNOSIS — N18.9 ACUTE KIDNEY INJURY SUPERIMPOSED ON CHRONIC KIDNEY DISEASE (HCC): ICD-10-CM

## 2023-10-11 DIAGNOSIS — D63.8 ANEMIA, CHRONIC DISEASE: ICD-10-CM

## 2023-10-11 DIAGNOSIS — E55.9 VITAMIN D DEFICIENCY: ICD-10-CM

## 2023-10-11 DIAGNOSIS — N17.9 ACUTE KIDNEY INJURY SUPERIMPOSED ON CHRONIC KIDNEY DISEASE (HCC): ICD-10-CM

## 2023-10-11 DIAGNOSIS — R53.1 RIGHT SIDED WEAKNESS: ICD-10-CM

## 2023-10-11 DIAGNOSIS — Z86.73 HISTORY OF CVA (CEREBROVASCULAR ACCIDENT): ICD-10-CM

## 2023-10-11 DIAGNOSIS — R29.90 STROKE-LIKE SYMPTOM: ICD-10-CM

## 2023-10-11 DIAGNOSIS — I42.9 CARDIOMYOPATHY, UNSPECIFIED TYPE (HCC): ICD-10-CM

## 2023-10-11 DIAGNOSIS — N18.9 CHRONIC KIDNEY DISEASE, UNSPECIFIED CKD STAGE: ICD-10-CM

## 2023-10-11 DIAGNOSIS — I63.10 CEREBROVASCULAR ACCIDENT (CVA) DUE TO EMBOLISM OF PRECEREBRAL ARTERY (HCC): ICD-10-CM

## 2023-10-11 DIAGNOSIS — M19.09 PRIMARY OSTEOARTHRITIS OF OTHER SITE: ICD-10-CM

## 2023-10-11 DIAGNOSIS — N40.0 BENIGN PROSTATIC HYPERPLASIA WITHOUT LOWER URINARY TRACT SYMPTOMS: ICD-10-CM

## 2023-10-11 DIAGNOSIS — E11.9 TYPE 2 DIABETES, DIET CONTROLLED (HCC): ICD-10-CM

## 2023-10-11 PROBLEM — R79.89 ELEVATED TROPONIN: Status: ACTIVE | Noted: 2023-10-11

## 2023-10-11 PROBLEM — N17.0 ACUTE RENAL FAILURE WITH ACUTE TUBULAR NECROSIS SUPERIMPOSED ON CHRONIC KIDNEY DISEASE (HCC): Status: ACTIVE | Noted: 2023-10-11

## 2023-10-11 LAB
ABO GROUP BLD: NORMAL
ALBUMIN SERPL BCP-MCNC: 4 G/DL (ref 3.2–4.9)
ALBUMIN/GLOB SERPL: 0.8 G/DL
ALP SERPL-CCNC: 150 U/L (ref 30–99)
ALT SERPL-CCNC: 14 U/L (ref 2–50)
ANION GAP SERPL CALC-SCNC: 13 MMOL/L (ref 7–16)
APPEARANCE UR: CLEAR
APTT PPP: 30.1 SEC (ref 24.7–36)
AST SERPL-CCNC: 19 U/L (ref 12–45)
BACTERIA #/AREA URNS HPF: ABNORMAL /HPF
BASOPHILS # BLD AUTO: 0.5 % (ref 0–1.8)
BASOPHILS # BLD: 0.03 K/UL (ref 0–0.12)
BILIRUB SERPL-MCNC: 0.3 MG/DL (ref 0.1–1.5)
BILIRUB UR QL STRIP.AUTO: NEGATIVE
BLD GP AB SCN SERPL QL: NORMAL
BUN SERPL-MCNC: 48 MG/DL (ref 8–22)
CALCIUM ALBUM COR SERPL-MCNC: 10.7 MG/DL (ref 8.5–10.5)
CALCIUM SERPL-MCNC: 10.7 MG/DL (ref 8.5–10.5)
CHLORIDE SERPL-SCNC: 106 MMOL/L (ref 96–112)
CO2 SERPL-SCNC: 22 MMOL/L (ref 20–33)
COLOR UR: YELLOW
CREAT SERPL-MCNC: 3.05 MG/DL (ref 0.5–1.4)
EKG IMPRESSION: NORMAL
EOSINOPHIL # BLD AUTO: 0.18 K/UL (ref 0–0.51)
EOSINOPHIL NFR BLD: 3 % (ref 0–6.9)
EPI CELLS #/AREA URNS HPF: NEGATIVE /HPF
ERYTHROCYTE [DISTWIDTH] IN BLOOD BY AUTOMATED COUNT: 47.3 FL (ref 35.9–50)
EST. AVERAGE GLUCOSE BLD GHB EST-MCNC: 137 MG/DL
GFR SERPLBLD CREATININE-BSD FMLA CKD-EPI: 21 ML/MIN/1.73 M 2
GLOBULIN SER CALC-MCNC: 5.1 G/DL (ref 1.9–3.5)
GLUCOSE SERPL-MCNC: 178 MG/DL (ref 65–99)
GLUCOSE UR STRIP.AUTO-MCNC: NEGATIVE MG/DL
HBA1C MFR BLD: 6.4 % (ref 4–5.6)
HCT VFR BLD AUTO: 42.8 % (ref 42–52)
HGB BLD-MCNC: 14 G/DL (ref 14–18)
HYALINE CASTS #/AREA URNS LPF: ABNORMAL /LPF
IMM GRANULOCYTES # BLD AUTO: 0.04 K/UL (ref 0–0.11)
IMM GRANULOCYTES NFR BLD AUTO: 0.7 % (ref 0–0.9)
INR PPP: 1.18 (ref 0.87–1.13)
KETONES UR STRIP.AUTO-MCNC: NEGATIVE MG/DL
LEUKOCYTE ESTERASE UR QL STRIP.AUTO: ABNORMAL
LYMPHOCYTES # BLD AUTO: 1.55 K/UL (ref 1–4.8)
LYMPHOCYTES NFR BLD: 25.5 % (ref 22–41)
MCH RBC QN AUTO: 28.4 PG (ref 27–33)
MCHC RBC AUTO-ENTMCNC: 32.7 G/DL (ref 32.3–36.5)
MCV RBC AUTO: 86.8 FL (ref 81.4–97.8)
MICRO URNS: ABNORMAL
MONOCYTES # BLD AUTO: 0.49 K/UL (ref 0–0.85)
MONOCYTES NFR BLD AUTO: 8.1 % (ref 0–13.4)
NEUTROPHILS # BLD AUTO: 3.79 K/UL (ref 1.82–7.42)
NEUTROPHILS NFR BLD: 62.2 % (ref 44–72)
NITRITE UR QL STRIP.AUTO: NEGATIVE
NRBC # BLD AUTO: 0 K/UL
NRBC BLD-RTO: 0 /100 WBC (ref 0–0.2)
PH UR STRIP.AUTO: 7.5 [PH] (ref 5–8)
PLATELET # BLD AUTO: 185 K/UL (ref 164–446)
PMV BLD AUTO: 11 FL (ref 9–12.9)
POTASSIUM SERPL-SCNC: 4.1 MMOL/L (ref 3.6–5.5)
PROT SERPL-MCNC: 9.1 G/DL (ref 6–8.2)
PROT UR QL STRIP: 30 MG/DL
PROTHROMBIN TIME: 15.2 SEC (ref 12–14.6)
RBC # BLD AUTO: 4.93 M/UL (ref 4.7–6.1)
RBC # URNS HPF: ABNORMAL /HPF
RBC UR QL AUTO: NEGATIVE
RH BLD: NORMAL
SODIUM SERPL-SCNC: 141 MMOL/L (ref 135–145)
SP GR UR STRIP.AUTO: 1.04
TROPONIN T SERPL-MCNC: 35 NG/L (ref 6–19)
UROBILINOGEN UR STRIP.AUTO-MCNC: 0.2 MG/DL
WBC # BLD AUTO: 6.1 K/UL (ref 4.8–10.8)
WBC #/AREA URNS HPF: ABNORMAL /HPF

## 2023-10-11 PROCEDURE — 700105 HCHG RX REV CODE 258: Performed by: NURSE PRACTITIONER

## 2023-10-11 PROCEDURE — 70496 CT ANGIOGRAPHY HEAD: CPT

## 2023-10-11 PROCEDURE — 81001 URINALYSIS AUTO W/SCOPE: CPT

## 2023-10-11 PROCEDURE — G0378 HOSPITAL OBSERVATION PER HR: HCPCS

## 2023-10-11 PROCEDURE — 71045 X-RAY EXAM CHEST 1 VIEW: CPT

## 2023-10-11 PROCEDURE — 86850 RBC ANTIBODY SCREEN: CPT

## 2023-10-11 PROCEDURE — 83036 HEMOGLOBIN GLYCOSYLATED A1C: CPT

## 2023-10-11 PROCEDURE — 80053 COMPREHEN METABOLIC PANEL: CPT

## 2023-10-11 PROCEDURE — 99222 1ST HOSP IP/OBS MODERATE 55: CPT | Performed by: NURSE PRACTITIONER

## 2023-10-11 PROCEDURE — A9270 NON-COVERED ITEM OR SERVICE: HCPCS | Performed by: NURSE PRACTITIONER

## 2023-10-11 PROCEDURE — 0042T CT-CEREBRAL PERFUSION ANALYSIS: CPT

## 2023-10-11 PROCEDURE — 86901 BLOOD TYPING SEROLOGIC RH(D): CPT

## 2023-10-11 PROCEDURE — 85025 COMPLETE CBC W/AUTO DIFF WBC: CPT

## 2023-10-11 PROCEDURE — 94760 N-INVAS EAR/PLS OXIMETRY 1: CPT

## 2023-10-11 PROCEDURE — 36415 COLL VENOUS BLD VENIPUNCTURE: CPT

## 2023-10-11 PROCEDURE — 84484 ASSAY OF TROPONIN QUANT: CPT

## 2023-10-11 PROCEDURE — 70450 CT HEAD/BRAIN W/O DYE: CPT

## 2023-10-11 PROCEDURE — 700102 HCHG RX REV CODE 250 W/ 637 OVERRIDE(OP): Performed by: NURSE PRACTITIONER

## 2023-10-11 PROCEDURE — 70498 CT ANGIOGRAPHY NECK: CPT

## 2023-10-11 PROCEDURE — 86900 BLOOD TYPING SEROLOGIC ABO: CPT

## 2023-10-11 PROCEDURE — 93005 ELECTROCARDIOGRAM TRACING: CPT | Performed by: EMERGENCY MEDICINE

## 2023-10-11 PROCEDURE — 85730 THROMBOPLASTIN TIME PARTIAL: CPT

## 2023-10-11 PROCEDURE — 99285 EMERGENCY DEPT VISIT HI MDM: CPT

## 2023-10-11 PROCEDURE — 700117 HCHG RX CONTRAST REV CODE 255: Performed by: EMERGENCY MEDICINE

## 2023-10-11 PROCEDURE — 85610 PROTHROMBIN TIME: CPT

## 2023-10-11 PROCEDURE — 99222 1ST HOSP IP/OBS MODERATE 55: CPT | Mod: FS | Performed by: NURSE PRACTITIONER

## 2023-10-11 RX ORDER — BUMETANIDE 2 MG/1
2 TABLET ORAL EVERY MORNING
Status: ON HOLD | COMMUNITY
End: 2023-10-18

## 2023-10-11 RX ORDER — ACETAMINOPHEN 325 MG/1
325 TABLET ORAL 2 TIMES DAILY
Status: DISCONTINUED | OUTPATIENT
Start: 2023-10-11 | End: 2023-10-18 | Stop reason: HOSPADM

## 2023-10-11 RX ORDER — ACETAMINOPHEN 325 MG/1
325 TABLET ORAL 2 TIMES DAILY
Status: ON HOLD | COMMUNITY
End: 2023-10-18 | Stop reason: SDUPTHER

## 2023-10-11 RX ORDER — VITAMIN B COMPLEX
1000 TABLET ORAL EVERY MORNING
Status: ON HOLD | COMMUNITY
End: 2023-10-18 | Stop reason: SDUPTHER

## 2023-10-11 RX ORDER — SODIUM CHLORIDE, SODIUM LACTATE, POTASSIUM CHLORIDE, CALCIUM CHLORIDE 600; 310; 30; 20 MG/100ML; MG/100ML; MG/100ML; MG/100ML
INJECTION, SOLUTION INTRAVENOUS CONTINUOUS
Status: DISCONTINUED | OUTPATIENT
Start: 2023-10-11 | End: 2023-10-12

## 2023-10-11 RX ORDER — EPLERENONE 25 MG/1
12.5 TABLET, FILM COATED ORAL EVERY MORNING
Status: ON HOLD | COMMUNITY
End: 2023-10-18

## 2023-10-11 RX ORDER — DEXTROSE MONOHYDRATE 25 G/50ML
25 INJECTION, SOLUTION INTRAVENOUS
Status: DISCONTINUED | OUTPATIENT
Start: 2023-10-11 | End: 2023-10-13

## 2023-10-11 RX ORDER — ELECTROLYTES/DEXTROSE
1 SOLUTION, ORAL ORAL EVERY MORNING
COMMUNITY

## 2023-10-11 RX ORDER — METOPROLOL SUCCINATE 50 MG/1
50 TABLET, EXTENDED RELEASE ORAL EVERY MORNING
Status: DISCONTINUED | OUTPATIENT
Start: 2023-10-12 | End: 2023-10-14

## 2023-10-11 RX ORDER — FINASTERIDE 5 MG/1
5 TABLET, FILM COATED ORAL EVERY MORNING
Status: DISCONTINUED | OUTPATIENT
Start: 2023-10-12 | End: 2023-10-18 | Stop reason: HOSPADM

## 2023-10-11 RX ORDER — ATORVASTATIN CALCIUM 40 MG/1
80 TABLET, FILM COATED ORAL
Status: DISCONTINUED | OUTPATIENT
Start: 2023-10-11 | End: 2023-10-18 | Stop reason: HOSPADM

## 2023-10-11 RX ORDER — GUAIFENESIN 200 MG/10ML
10 LIQUID ORAL EVERY MORNING
Status: ON HOLD | COMMUNITY
End: 2023-10-18

## 2023-10-11 RX ORDER — CALCITRIOL 0.25 UG/1
0.25 CAPSULE, LIQUID FILLED ORAL EVERY MORNING
Status: ON HOLD | COMMUNITY
End: 2023-10-18 | Stop reason: SDUPTHER

## 2023-10-11 RX ORDER — METOPROLOL SUCCINATE 50 MG/1
50 TABLET, EXTENDED RELEASE ORAL EVERY MORNING
Status: ON HOLD | COMMUNITY
End: 2023-10-18 | Stop reason: SDUPTHER

## 2023-10-11 RX ORDER — TAMSULOSIN HYDROCHLORIDE 0.4 MG/1
0.8 CAPSULE ORAL NIGHTLY
Status: DISCONTINUED | OUTPATIENT
Start: 2023-10-11 | End: 2023-10-18 | Stop reason: HOSPADM

## 2023-10-11 RX ORDER — BENZONATATE 100 MG/1
100 CAPSULE ORAL 3 TIMES DAILY PRN
Status: ON HOLD | COMMUNITY
End: 2023-10-18

## 2023-10-11 RX ORDER — LISINOPRIL 10 MG/1
10 TABLET ORAL EVERY MORNING
Status: ON HOLD | COMMUNITY
End: 2023-10-18

## 2023-10-11 RX ADMIN — APIXABAN 2.5 MG: 5 TABLET, FILM COATED ORAL at 20:09

## 2023-10-11 RX ADMIN — IOHEXOL 100 ML: 350 INJECTION, SOLUTION INTRAVENOUS at 16:26

## 2023-10-11 RX ADMIN — ATORVASTATIN CALCIUM 80 MG: 80 TABLET, FILM COATED ORAL at 20:20

## 2023-10-11 RX ADMIN — IOHEXOL 40 ML: 350 INJECTION, SOLUTION INTRAVENOUS at 16:36

## 2023-10-11 RX ADMIN — SODIUM CHLORIDE, POTASSIUM CHLORIDE, SODIUM LACTATE AND CALCIUM CHLORIDE: 600; 310; 30; 20 INJECTION, SOLUTION INTRAVENOUS at 20:09

## 2023-10-11 RX ADMIN — ACETAMINOPHEN 325 MG: 325 TABLET, FILM COATED ORAL at 20:09

## 2023-10-11 RX ADMIN — TAMSULOSIN HYDROCHLORIDE 0.8 MG: 0.4 CAPSULE ORAL at 20:20

## 2023-10-11 ASSESSMENT — LIFESTYLE VARIABLES
HAVE YOU EVER FELT YOU SHOULD CUT DOWN ON YOUR DRINKING: NO
TOTAL SCORE: 0
EVER FELT BAD OR GUILTY ABOUT YOUR DRINKING: NO
TOTAL SCORE: 0
AVERAGE NUMBER OF DAYS PER WEEK YOU HAVE A DRINK CONTAINING ALCOHOL: 0
CONSUMPTION TOTAL: NEGATIVE
HOW MANY TIMES IN THE PAST YEAR HAVE YOU HAD 5 OR MORE DRINKS IN A DAY: 0
DOES PATIENT WANT TO STOP DRINKING: NO
ALCOHOL_USE: YES
HAVE PEOPLE ANNOYED YOU BY CRITICIZING YOUR DRINKING: NO
TOTAL SCORE: 0
ON A TYPICAL DAY WHEN YOU DRINK ALCOHOL HOW MANY DRINKS DO YOU HAVE: 1
EVER HAD A DRINK FIRST THING IN THE MORNING TO STEADY YOUR NERVES TO GET RID OF A HANGOVER: NO

## 2023-10-11 ASSESSMENT — ENCOUNTER SYMPTOMS
SHORTNESS OF BREATH: 0
ABDOMINAL PAIN: 0
DEPRESSION: 0
COUGH: 0
WEIGHT LOSS: 0
SINUS PAIN: 0
HEADACHES: 0
NERVOUS/ANXIOUS: 0
BLOOD IN STOOL: 0
VOMITING: 0
SORE THROAT: 0
FOCAL WEAKNESS: 1
DIARRHEA: 0
DIAPHORESIS: 0
HEARTBURN: 0
CONSTIPATION: 0
CHILLS: 0
MYALGIAS: 0
EYES NEGATIVE: 1
HEMOPTYSIS: 0
PALPITATIONS: 0
DIZZINESS: 0
NAUSEA: 0
ORTHOPNEA: 0
WHEEZING: 0
FLANK PAIN: 0
FEVER: 0

## 2023-10-11 ASSESSMENT — CHA2DS2 SCORE
AGE 75 OR GREATER: NO
CHF OR LEFT VENTRICULAR DYSFUNCTION: NO
VASCULAR DISEASE: YES
PRIOR STROKE OR TIA OR THROMBOEMBOLISM: YES
SEX: MALE
CHA2DS2 VASC SCORE: 6
AGE 65 TO 74: YES
HYPERTENSION: YES
DIABETES: YES

## 2023-10-11 ASSESSMENT — COGNITIVE AND FUNCTIONAL STATUS - GENERAL
SUGGESTED CMS G CODE MODIFIER MOBILITY: CL
TURNING FROM BACK TO SIDE WHILE IN FLAT BAD: A LOT
PERSONAL GROOMING: A LOT
CLIMB 3 TO 5 STEPS WITH RAILING: TOTAL
MOVING FROM LYING ON BACK TO SITTING ON SIDE OF FLAT BED: A LOT
TOILETING: A LITTLE
MOBILITY SCORE: 10
DRESSING REGULAR LOWER BODY CLOTHING: A LOT
WALKING IN HOSPITAL ROOM: TOTAL
EATING MEALS: A LITTLE
DAILY ACTIVITIY SCORE: 14
DRESSING REGULAR UPPER BODY CLOTHING: A LOT
MOVING TO AND FROM BED TO CHAIR: A LOT
HELP NEEDED FOR BATHING: A LOT
SUGGESTED CMS G CODE MODIFIER DAILY ACTIVITY: CK
STANDING UP FROM CHAIR USING ARMS: A LOT

## 2023-10-11 ASSESSMENT — FIBROSIS 4 INDEX
FIB4 SCORE: 1.87
FIB4 SCORE: 1.38

## 2023-10-11 ASSESSMENT — PAIN DESCRIPTION - PAIN TYPE: TYPE: ACUTE PAIN

## 2023-10-11 ASSESSMENT — PATIENT HEALTH QUESTIONNAIRE - PHQ9
1. LITTLE INTEREST OR PLEASURE IN DOING THINGS: NOT AT ALL
2. FEELING DOWN, DEPRESSED, IRRITABLE, OR HOPELESS: NOT AT ALL
SUM OF ALL RESPONSES TO PHQ9 QUESTIONS 1 AND 2: 0

## 2023-10-11 NOTE — ED TRIAGE NOTES
Chief Complaint   Patient presents with    Possible Stroke      Pt BIB EMS from [facility] home. Per report Pt went to an apt. With MD when he returned to home staff noted right facial droop, right arm and leg weakness.    Pt presents with mild right facial paralysis, right arm and leg weakness. Pt . Per report Pt takes Apixaban.

## 2023-10-11 NOTE — CONSULTS
Chief Complaint   Patient presents with    Possible Stroke       Problem List Items Addressed This Visit    None      Neurology Stroke Alert Consultation     History of present illness:  This is a 68-year old male with Pmhx significant for hypertension, CKD, Afib on Eliquis, multiple strokes, further details are limited who presented to [facility] on 10/11/23 for a chief complaint of confusion and Right sided weakness. The patient was reportedly last seen in his usual state of health at 1400 today when he went to an outpatient nephrology appointment; when he returned after 1500, he was noted to be confused with RLE>RUE weakness. EMS called; BG WNL, /61. Stat CT head revealed no acute intracranial abnormality; multiple old areas of apparent infarct about the brain. CTA head/neck with no LVO nor hemodynamically significant stenosis. Patient's exam was largely inconsistent; he appeared confused/encephalopathic; first noting LLE pain, then RLE weakness. Speech at baseline. No report of sensory deficit. Patient was ultimately determined not to be a candidate for IV thrombolytics secondary to concurrent use of Eliquis (as well as unclear nature of symptoms/deficits). Currently patient is sitting up in stretcher; awake and alert. He is oriented to self, however is difficult to engage in exam; he admits to persistent RLE weakness and pain throughout. He denies dizziness, problem with vision speech or swallowing.     Neurology has been consulted by Dr. Shahram Qiu to further evaluate the findings noted above.     Past medical history:   Past Medical History:   Diagnosis Date    History of brain cancer     Hypertension     Valvular heart disease        Past surgical history:   Past Surgical History:   Procedure Laterality Date    CRANIOTOMY         Family history:   Family History   Problem Relation Age of Onset    Heart Disease Neg Hx        Social history:   Social History     Socioeconomic History     Marital status: Single     Spouse name: Not on file    Number of children: Not on file    Years of education: Not on file    Highest education level: Not on file   Occupational History    Not on file   Tobacco Use    Smoking status: Former     Current packs/day: 0.00     Types: Cigarettes     Quit date: 3/26/2008     Years since quitting: 15.5    Smokeless tobacco: Never   Substance and Sexual Activity    Alcohol use: Not on file    Drug use: Not on file    Sexual activity: Not on file   Other Topics Concern    Not on file   Social History Narrative    Not on file     Social Determinants of Health     Financial Resource Strain: Not on file   Food Insecurity: Not on file   Transportation Needs: Not on file   Physical Activity: Not on file   Stress: Not on file   Social Connections: Not on file   Intimate Partner Violence: Not on file   Housing Stability: Not on file       Current medications:   No current facility-administered medications for this encounter.     Current Outpatient Medications   Medication    metoprolol SR (TOPROL XL) 50 MG TABLET SR 24 HR    guaiFENesin (ROBITUSSIN) 100 MG/5ML liquid    acetaminophen (TYLENOL) 325 MG Tab    Multiple Vitamin (MULTIVITAMIN ADULT) Tab    benzonatate (TESSALON) 100 MG Cap    lisinopril (PRINIVIL) 10 MG Tab    apixaban (ELIQUIS) 2.5mg Tab    bumetanide (BUMEX) 2 MG tablet    calcitRIOL (ROCALTROL) 0.25 MCG Cap    vitamin D3 (CHOLECALCIFEROL) 1000 Unit (25 mcg) Tab    eplerenone (INSPRA) 25 MG Tab    atorvastatin (LIPITOR) 80 MG tablet    ferrous sulfate 325 (65 Fe) MG tablet    finasteride (PROSCAR) 5 MG Tab    tamsulosin (FLOMAX) 0.4 MG capsule       Medication Allergy:  Allergies   Allergen Reactions    Pcn [Penicillins]        Review of systems:   Constitutional: denies fever, night sweats, weight loss.   Eyes: denies acute vision change, eye pain or secretion.   Ears, Nose, Mouth, Throat: denies nasal secretion, nasal bleeding, difficulty swallowing, hearing loss,  "tinnitus, vertigo, ear pain, acute dental problems, oral ulcers or lesions.   Endocrine: denies recent weight changes, heat or cold intolerance, polyuria, polydypsia, polyphagia,abnormal hair growth.  Cardiovascular: denies new onset of chest pain, palpitations, syncope, or dyspnea of exertion.  Pulmonary: denies shortness of breath, new onset of cough, hemoptysis, wheezing, chest pain or flu-like symptoms.   GI: denies nausea, vomiting, diarrhea, GI bleeding, change in appetite, abdominal pain, and change in bowel habits.  : denies dysuria, urinary incontinence, hematuria.  Heme/oncology: denies history of easy bruising or bleeding. No history of cancer, DVTor PE.  Allergy/immunology: denies hives/urticaria, or itching.   Dermatologic: denies new rash, or new skin lesions.  Musculoskeletal:denies joint swelling or pain, muscle pain, neck and back pain.   Neurologic: As noted in detail above.   Psychiatric: denies symptoms of depression, anxiety, hallucinations, mood swings or changes, suicidal or homicidal thoughts.     Physical examination:   Vitals:    10/11/23 1629   BP: 120/61   Pulse: (!) 107   Resp: 16   SpO2: 95%   Weight: (!) 132 kg (290 lb)   Height: 1.803 m (5' 11\")     General: Patient in no acute distress, pleasant and cooperative.  HEENT: Normocephalic, no signs of acute trauma.   Neck: supple, no meningeal signs. There is normal range of motion. No tenderness on exam.   Chest: clear to auscultation. No cough.   CV: RRR, no murmurs.   Skin: no signs of acute rashes or trauma.   Musculoskeletal: joints exhibit full range of motion, without any pain to palpation. There are no signs of joint or muscle swelling. There is no tenderness to deep palpation of muscles.   Psychiatric: No hallucinatory behavior.       NEUROLOGICAL EXAM:   Mental status, orientation: Awake, alert. Oriented to self, place, age.    Speech and language: speech is dysarthric (absent/poor dentition), fluent, comprehensible. The " patient is able to name, repeat and comprehend.   Cranial nerve exam: Pupils are 3-4 mm bilaterally and equally reactive to light. Visual fields are intact by confrontation.  There is no nystagmus on primary or secondary gaze. Intact full EOM in all directions of gaze. Face appears symmetric on activation. Sensation in the face is intact to light touch. Tongue is midline and without any signs of tongue biting. Shoulder shrug is intact bilaterally.   Motor exam: Strength is 5/5 to LUE, 4+/5 to RUE, LLE appx 4/5 limited secondary to pain; RLE 2+/5 with minimal antigravity. Tone is normal. No abnormal movements were seen on exam.   Sensory exam grimace to nox stim x 4.   Deep tendon reflexes:  Plantar responses are flexor.   Coordination: shows a normal finger-nose-finger.   Gait: Not assessed at this time as patient is a fall risk.       NIH Stroke Scale    1a Level of Consciousness   1b Orientation Questions   1c Response to Commands   2 Gaze   3 Visual Fields   4 Facial Movement   5 Motor Function (arm)   a Left   b Right 1  6 Motor Function (leg)   a Left 1  b Right 3  7 Limb Ataxia   8 Sensory   9 Language   10 Articulation 1  11 Extinction/Inattention     Score: 6      ANCILLARY DATA REVIEWED:     Lab Data Review:  Recent Results (from the past 24 hour(s))   CBC WITH DIFFERENTIAL    Collection Time: 10/11/23  4:04 PM   Result Value Ref Range    WBC 6.1 4.8 - 10.8 K/uL    RBC 4.93 4.70 - 6.10 M/uL    Hemoglobin 14.0 14.0 - 18.0 g/dL    Hematocrit 42.8 42.0 - 52.0 %    MCV 86.8 81.4 - 97.8 fL    MCH 28.4 27.0 - 33.0 pg    MCHC 32.7 32.3 - 36.5 g/dL    RDW 47.3 35.9 - 50.0 fL    Platelet Count 185 164 - 446 K/uL    MPV 11.0 9.0 - 12.9 fL    Neutrophils-Polys 62.20 44.00 - 72.00 %    Lymphocytes 25.50 22.00 - 41.00 %    Monocytes 8.10 0.00 - 13.40 %    Eosinophils 3.00 0.00 - 6.90 %    Basophils 0.50 0.00 - 1.80 %    Immature Granulocytes 0.70 0.00 - 0.90 %    Nucleated RBC 0.00 0.00 - 0.20 /100 WBC    Neutrophils  (Absolute) 3.79 1.82 - 7.42 K/uL    Lymphs (Absolute) 1.55 1.00 - 4.80 K/uL    Monos (Absolute) 0.49 0.00 - 0.85 K/uL    Eos (Absolute) 0.18 0.00 - 0.51 K/uL    Baso (Absolute) 0.03 0.00 - 0.12 K/uL    Immature Granulocytes (abs) 0.04 0.00 - 0.11 K/uL    NRBC (Absolute) 0.00 K/uL   EKG (NOW)    Collection Time: 10/11/23  4:32 PM   Result Value Ref Range    Report       [facility]    Test Date:  2023-10-11  Pt Name:    TUSHAR YARBROUGH                Department: ER  MRN:        [id number]                      Room:        24  Gender:     Male                         Technician: [id number]  :        1955                   Requested By:SRIKANTH OLSON  Order #:    [id number]                    Reading MD:    Measurements  Intervals                                Axis  Rate:       107                          P:          46  ME:         177                          QRS:        -35  QRSD:       91                           T:          91  QT:         358  QTc:        478    Interpretive Statements  Sinus tachycardia  Probable left atrial enlargement  Abnormal R-wave progression, early transition  Inferior infarct, old  Compared to ECG 2019 16:00:01  Myocardial infarct finding now present  Sinus rhythm no longer present  Prolonged QT interval no longer present         Labs reviewed by me.         Imaging reviewed by me:     CT-CEREBRAL PERFUSION ANALYSIS   Final Result      1.  Cerebral blood flow less than 30% likely representing completed infarct = 0 mL.      2.  T Max more than 6 seconds likely representing combination of completed infarct and ischemia = 0 mL.      3.  Mismatched volume likely representing ischemic brain/penumbra = 0 mL.      Please note that the cerebral perfusion was performed on the limited brain tissue around the basal ganglia region. Infarct/ischemia outside the CT perfusion sections can be missed in this study.      CT-CTA NECK WITH & W/O-POST  PROCESSING   Final Result      No focal high-grade stenosis, dissection or occlusion of the cervical carotid or vertebral arteries.      CT-CTA HEAD WITH & W/O-POST PROCESS   Final Result      No intracranial aneurysm, focal stenosis, or abrupt large vessel cut off.      CT-HEAD W/O   Final Result      1.  No evidence of acute territorial infarct, intracranial hemorrhage or mass lesion.   2.  Moderate diffuse cerebral substance loss.   3.  Advanced microangiopathic ischemic change versus demyelination or gliosis.   4.  Areas of encephalomalacia in the right frontal and right occipital lobe which may related to previous infarct or trauma.         DX-CHEST-PORTABLE (1 VIEW)    (Results Pending)         Presumed mechanism by TOAST:  __Large Artery Atherosclerosis  __Small Vessel (Lacunar)  __Cardioembolic  __Other (Sickle Cell, Vasculitis, Hypercoagulable)  _X_Unknown    Modified [place] Scale (MRS): 3 = Moderate disability; requires some help, but able to walk without assistance      ASSESSMENT AND PLAN:  68-year old male with Pmhx significant for hypertension, CKD, Afib on Eliquis, multiple strokes, further details are limited who presented to [facility] on 10/11/23 for a chief complaint of confusion and Right sided weakness; was found to have RLE>RUE weakness while at his group home, EMS called; here CT head revealed no acute intracranial abnormality; multiple old areas of apparent infarct about the brain. CTA head/neck with no LVO nor hemodynamically significant stenosis. CT perfusion with no perfusion mismatch. Patient was ultimately determined not to be a candidate for IV thrombolytics given mild and somewhat inconsistent neurological exam-- patient appears encephalopathic--likely at least partially explained by his elevated Cr; last Cr in 2021 was 2.67-->3.05 today; his only focal deficit appears to be RLE weakness (note DEACON's appear patent). Recommend MRI Brain wo contrast; additional work up determined  upon results of MRI.     Recommendations/Plan:     -q4h and PRN neuro assessment. VS per nursing/unit protocol. BP goal is normotension, 100-130/60-80. Antihypertensives per primary team.   -Obtain MRI Brain wo contrast.   -Continue home dose Eliquis 2.5 mg PO BID.  -Continue home dose Atorvastatin 80 mg PO q HS.    -Recommend aggressive BG management per primary team. Avoid IVF with Dextrose. Check hemoglobin A1c.   -PT/OT/SLP eval and treat.   -Check UA, UDS, ETOH, troponin to rule out other potential sources of encephalopathy.   -Will follow up with [facility] and make additional recommendations accordingly; All other medical management per primary team.   -DVT PPX: SCDs.      The plan of care above has been discussed with Dr. Cifuentes. Please call with questions.     JOCELYN Fabian.  [employer]

## 2023-10-11 NOTE — ED NOTES
Pharmacy Medication Reconciliation      ~Medication reconciliation updated and complete per patient caregiver Al over the phone @[phone]  ~Patient home pharmacy :  [facility]      ~Anticoagulants (rivaroxaban, apixaban, edoxaban, dabigatran, warfarin, enoxaparin) taken in the last 14 days? Yes  ~Anticoagulant: Eliquis, Last dose: 10/11/2023 @ 0800

## 2023-10-11 NOTE — ED PROVIDER NOTES
ER Provider Note    Scribed for Shahram Qiu M.d. by Wilmar Dos Santos. 10/11/2023  4:05 PM    Primary Care Provider: Pcp Not In Computer    CHIEF COMPLAINT  Chief Complaint   Patient presents with    Possible Stroke     EXTERNAL RECORDS REVIEWED  Inpatient Notes shows that the patient was last admitted to the hospital in 2019 for a CVA.     HPI/ROS  LIMITATION TO HISTORY   Select: Patient's clinical condition  OUTSIDE HISTORIAN(S):  EMS , who was able to help contribute to the patient's symptoms    Brannon Silver is a 68 y.o. male who presents to the ED via EMS for evaluation of a possible stroke. Per EMS, patient was at a kidney appointment. When he got to his group home at around 3:20 PM, the caretaker noticed that the patient seemed to have some right sided deficits. She noticed right sided facial droop with right sided weakness. EMS was called. Per Paramedic, patient was unable to speak with him for the first five minutes of their arrival. He also had altered mental status. Patient was then taken to the ED for further evaluation. Currently in the ED, patient does have some facial droop on the right with some right sided weakness. History of a stroke in 2019, but patient is unable to recall deficits of it at this time. However, he does note that the right sided leg weakness is new for him. No alleviating or exacerbating factors reported. EMS notes that the patient is on Apixaban, but does not know when his last dose was. Drug allergies to Penicillins.     PAST MEDICAL HISTORY  Past Medical History:   Diagnosis Date    History of brain cancer     Hypertension     Valvular heart disease    CVA in 2019    SURGICAL HISTORY  Past Surgical History:   Procedure Laterality Date    CRANIOTOMY         FAMILY HISTORY  Family History   Problem Relation Age of Onset    Heart Disease Neg Hx        SOCIAL HISTORY   reports that he quit smoking about 15 years ago. He has never used smokeless tobacco.    CURRENT  "MEDICATIONS  Previous Medications    ACETAMINOPHEN (TYLENOL) 325 MG TAB    Take 325 mg by mouth 2 times a day.    APIXABAN (ELIQUIS) 2.5MG TAB    Take 2.5 mg by mouth 2 times a day.    ATORVASTATIN (LIPITOR) 80 MG TABLET    Take 1 Tab by mouth every evening.    BENZONATATE (TESSALON) 100 MG CAP    Take 100 mg by mouth 3 times a day as needed for Cough.    BUMETANIDE (BUMEX) 2 MG TABLET    Take 2 mg by mouth every morning.    CALCITRIOL (ROCALTROL) 0.25 MCG CAP    Take 0.25 mcg by mouth every morning.    EPLERENONE (INSPRA) 25 MG TAB    Take 12.5 mg by mouth every morning.    FERROUS SULFATE 325 (65 FE) MG TABLET    Take 325 mg by mouth 3 times a week. Monday, tuesday & saturday    FINASTERIDE (PROSCAR) 5 MG TAB    Take 5 mg by mouth every morning.    GUAIFENESIN (ROBITUSSIN) 100 MG/5ML LIQUID    Take 10 mL by mouth every morning.    LISINOPRIL (PRINIVIL) 10 MG TAB    Take 10 mg by mouth every morning.    METOPROLOL SR (TOPROL XL) 50 MG TABLET SR 24 HR    Take 50 mg by mouth every morning.    MULTIPLE VITAMIN (MULTIVITAMIN ADULT) TAB    Take 1 Tablet by mouth every morning.    TAMSULOSIN (FLOMAX) 0.4 MG CAPSULE    Take 0.8 mg by mouth every evening.    VITAMIN D3 (CHOLECALCIFEROL) 1000 UNIT (25 MCG) TAB    Take 1,000 Units by mouth every morning.       ALLERGIES  Pcn [penicillins]    PHYSICAL EXAM  /61   Pulse (!) 107   Resp 16   Ht 1.803 m (5' 11\")   Wt (!) 132 kg (290 lb)   SpO2 95%   BMI 40.45 kg/m²   Constitutional: Well developed, Well nourished  HENT: Normocephalic, Atraumatic, slight facial droop on the right.   Eyes: Conjunctiva normal, No discharge.   Neck: Supple, No stridor   Cardiovascular: Normal heart rate, Normal rhythm, No murmurs, equal pulses.   Pulmonary: Normal breath sounds, No respiratory distress, No wheezing, No rales, No rhonchi.  Chest: No chest wall tenderness or deformity.   Abdomen:Soft, No tenderness, No masses, no rebound, no guarding.   Back: No CVA tenderness. "   Musculoskeletal: No major deformities noted, No tenderness.   Skin: Warm, Dry, No erythema, No rash.   Neurologic: Alert & oriented x 3, Normal motor function,  He has right arm and leg drift. Mild facial droop on the right side. NIH Score of 4.   Psychiatric: Affect normal, Judgment normal, Mood normal.       DIAGNOSTIC STUDIES    Labs:   Results for orders placed or performed during the hospital encounter of 10/11/23   CBC WITH DIFFERENTIAL   Result Value Ref Range    WBC 6.1 4.8 - 10.8 K/uL    RBC 4.93 4.70 - 6.10 M/uL    Hemoglobin 14.0 14.0 - 18.0 g/dL    Hematocrit 42.8 42.0 - 52.0 %    MCV 86.8 81.4 - 97.8 fL    MCH 28.4 27.0 - 33.0 pg    MCHC 32.7 32.3 - 36.5 g/dL    RDW 47.3 35.9 - 50.0 fL    Platelet Count 185 164 - 446 K/uL    MPV 11.0 9.0 - 12.9 fL    Neutrophils-Polys 62.20 44.00 - 72.00 %    Lymphocytes 25.50 22.00 - 41.00 %    Monocytes 8.10 0.00 - 13.40 %    Eosinophils 3.00 0.00 - 6.90 %    Basophils 0.50 0.00 - 1.80 %    Immature Granulocytes 0.70 0.00 - 0.90 %    Nucleated RBC 0.00 0.00 - 0.20 /100 WBC    Neutrophils (Absolute) 3.79 1.82 - 7.42 K/uL    Lymphs (Absolute) 1.55 1.00 - 4.80 K/uL    Monos (Absolute) 0.49 0.00 - 0.85 K/uL    Eos (Absolute) 0.18 0.00 - 0.51 K/uL    Baso (Absolute) 0.03 0.00 - 0.12 K/uL    Immature Granulocytes (abs) 0.04 0.00 - 0.11 K/uL    NRBC (Absolute) 0.00 K/uL   COMP METABOLIC PANEL   Result Value Ref Range    Sodium 141 135 - 145 mmol/L    Potassium 4.1 3.6 - 5.5 mmol/L    Chloride 106 96 - 112 mmol/L    Co2 22 20 - 33 mmol/L    Anion Gap 13.0 7.0 - 16.0    Glucose 178 (H) 65 - 99 mg/dL    Bun 48 (H) 8 - 22 mg/dL    Creatinine 3.05 (H) 0.50 - 1.40 mg/dL    Calcium 10.7 (H) 8.5 - 10.5 mg/dL    Correct Calcium 10.7 (H) 8.5 - 10.5 mg/dL    AST(SGOT) 19 12 - 45 U/L    ALT(SGPT) 14 2 - 50 U/L    Alkaline Phosphatase 150 (H) 30 - 99 U/L    Total Bilirubin 0.3 0.1 - 1.5 mg/dL    Albumin 4.0 3.2 - 4.9 g/dL    Total Protein 9.1 (H) 6.0 - 8.2 g/dL    Globulin 5.1 (H) 1.9  - 3.5 g/dL    A-G Ratio 0.8 g/dL   PROTHROMBIN TIME   Result Value Ref Range    PT 15.2 (H) 12.0 - 14.6 sec    INR 1.18 (H) 0.87 - 1.13   APTT   Result Value Ref Range    APTT 30.1 24.7 - 36.0 sec   COD (ADULT)   Result Value Ref Range    ABO Grouping Only B     Rh Grouping Only POS     Antibody Screen-Cod NEG    TROPONIN   Result Value Ref Range    Troponin T 35 (H) 6 - 19 ng/L   ESTIMATED GFR   Result Value Ref Range    GFR (CKD-EPI) 21 (A) >60 mL/min/1.73 m 2   EKG (NOW)   Result Value Ref Range    Report       [facility]    Test Date:  2023-10-11  Pt Name:    TUSHAR YARBROUGH                Department: ER  MRN:        [id number]                      Room:       [facility]  Gender:     Male                         Technician: [id number]  :        1955                   Requested By:SRIKANTH OLSON  Order #:    [id number]                    Reading MD: SRIKANTH OLSON MD    Measurements  Intervals                                Axis  Rate:       107                          P:          46  CO:         177                          QRS:        -35  QRSD:       91                           T:          91  QT:         358  QTc:        478    Interpretive Statements  Sinus tachycardia, Rate of 107, leftward axis, no ST elevation  Probable left atrial enlargement  Abnormal R-wave progression, early transition  Inferior infarct, old  Compared to ECG 2019 16:00:01  Myocardial infarct finding now present  Sinus rhythm no longer present  Prolonged QT interv al no longer present  Electronically Signed On 10- 16:55:06 PDT by SRIKANTH OLSON MD          EKG:   I have independently interpreted this EKG above     Radiology:   The attending emergency physician has independently interpreted the diagnostic imaging associated with this visit and am waiting the final reading from the radiologist.   Preliminary interpretation is a follows: CT of the head does not show any  intracranial hemorrhage.  Radiologist interpretation:   DX-CHEST-PORTABLE (1 VIEW)   Final Result      1.  Hypoinflation with minimal bibasilar atelectasis   2.  No pneumonia or pneumothorax.   3.  Stable cardiomegaly.      CT-CEREBRAL PERFUSION ANALYSIS   Final Result      1.  Cerebral blood flow less than 30% likely representing completed infarct = 0 mL.      2.  T Max more than 6 seconds likely representing combination of completed infarct and ischemia = 0 mL.      3.  Mismatched volume likely representing ischemic brain/penumbra = 0 mL.      Please note that the cerebral perfusion was performed on the limited brain tissue around the basal ganglia region. Infarct/ischemia outside the CT perfusion sections can be missed in this study.      CT-CTA NECK WITH & W/O-POST PROCESSING   Final Result      No focal high-grade stenosis, dissection or occlusion of the cervical carotid or vertebral arteries.      CT-CTA HEAD WITH & W/O-POST PROCESS   Final Result      No intracranial aneurysm, focal stenosis, or abrupt large vessel cut off.      CT-HEAD W/O   Final Result      1.  No evidence of acute territorial infarct, intracranial hemorrhage or mass lesion.   2.  Moderate diffuse cerebral substance loss.   3.  Advanced microangiopathic ischemic change versus demyelination or gliosis.   4.  Areas of encephalomalacia in the right frontal and right occipital lobe which may related to previous infarct or trauma.         MR-BRAIN-W/O    (Results Pending)        COURSE & MEDICAL DECISION MAKING     ED Observation Status? no      INITIAL ASSESSMENT, COURSE AND PLAN  Care Narrative:     4:14 PM - Patient was seen and evaluated emergently by the Charge Desk as a Stroke Alert. Neurology at bedside. After my exam, I discussed with the patient the plan of care, which includes treating the patient with medication for their symptoms, as well as obtaining lab work and imaging for further evaluation. Patient understands and verbalizes  agreement to plan of care. Ordered DX-chest, CT-head without, CT-cerebral perfusion analysis, CT-CTA head with and without, CT-CTA neck with and without, EKG, CBC with diff, CMP, Prothrombin Time, APTT, COD (Adult), and Troponin to evaluate.       4:30 PM - I discussed the patient's case and the above findings with Neurology who reviewed the patient's imaging and see nothing on perfusion. He did see evidence of an old stroke. He recommends admission as he is not sure if the patient had a seizure or had a TIA.    4:55 PM - Paged Hospitalist    5:06 PM - Patient was reevaluated at bedside. Discussed lab and radiology results with the patient and informed them of the plan for admission. Patient understands and verbalizes agreement to plan of care.      5:30 PM - Hospitalist responded. I discussed the patient's case and the above findings with the Hospitalist PA who agrees to evaluate the patient for hospitalization.         PROBLEM LIST  Problem #1 right-sided weakness with aphasia.  By arrival to the emergency department patient's symptoms have been improving rapidly.  It is unclear if this is a TIA versus a stroke.  Patient still has some weakness in the right leg.  CT of the head was obtained there is no intracranial hemorrhage or obvious large vessel occlusion.  He is not a candidate for thrombolytics given his anticoagulation on Eliquis.  At this point time I think he benefit from hospitalization for stroke work-up.  And possible PT and OT evaluation.    Problem #2 chronic kidney disease patient does have known chronic kidney disease is unclear if this is worse from baseline since her last labs are from 2021.  This will need to be trended.    DISPOSITION AND DISCUSSIONS  I have discussed management of the patient with the following physicians and SHEILA's:  Neurology and Hospitalist PA    Discussion of management with other [facility] [place] or appropriate source(s): Pharmacy in regards to medication        Barriers to care at  this time, including but not limited to:  None .     Decision tools and prescription drugs considered including, but not limited to: NIH Stroke Scale 4 .    Patient will be hospitalized by Hospitalist PA in guarded condition.      FINAL DIAGNOSIS  1. Stroke-like symptom    2. Right sided weakness    3. Chronic kidney disease, unspecified CKD stage         I, Wilmar Dos Santos (Scribe), am scribing for, and in the presence of, KAL Arias*.    Electronically signed by: Wilmar Dos Santos (Scribe), 10/11/2023    IShahram M.* personally performed the services described in this documentation, as scribed by Wilmar Dos Santos in my presence, and it is both accurate and complete.      The note accurately reflects work and decisions made by me.  Shahram Qiu M.D.  10/11/2023  5:43 PM

## 2023-10-12 ENCOUNTER — APPOINTMENT (OUTPATIENT)
Dept: RADIOLOGY | Facility: MEDICAL CENTER | Age: 68
End: 2023-10-12
Attending: INTERNAL MEDICINE
Payer: COMMERCIAL

## 2023-10-12 PROBLEM — F09 COGNITIVE DISORDER: Status: ACTIVE | Noted: 2023-10-12

## 2023-10-12 LAB
ALBUMIN SERPL BCP-MCNC: 3.3 G/DL (ref 3.2–4.9)
ALBUMIN/GLOB SERPL: 0.8 G/DL
ALP SERPL-CCNC: 118 U/L (ref 30–99)
ALT SERPL-CCNC: 7 U/L (ref 2–50)
ANION GAP SERPL CALC-SCNC: 12 MMOL/L (ref 7–16)
AST SERPL-CCNC: 17 U/L (ref 12–45)
BILIRUB SERPL-MCNC: 0.2 MG/DL (ref 0.1–1.5)
BUN SERPL-MCNC: 48 MG/DL (ref 8–22)
CALCIUM ALBUM COR SERPL-MCNC: 10.1 MG/DL (ref 8.5–10.5)
CALCIUM SERPL-MCNC: 9.5 MG/DL (ref 8.5–10.5)
CHLORIDE SERPL-SCNC: 105 MMOL/L (ref 96–112)
CHOLEST SERPL-MCNC: 84 MG/DL (ref 100–199)
CO2 SERPL-SCNC: 21 MMOL/L (ref 20–33)
CREAT SERPL-MCNC: 3.22 MG/DL (ref 0.5–1.4)
GFR SERPLBLD CREATININE-BSD FMLA CKD-EPI: 20 ML/MIN/1.73 M 2
GLOBULIN SER CALC-MCNC: 4.2 G/DL (ref 1.9–3.5)
GLUCOSE BLD STRIP.AUTO-MCNC: 110 MG/DL (ref 65–99)
GLUCOSE BLD STRIP.AUTO-MCNC: 122 MG/DL (ref 65–99)
GLUCOSE BLD STRIP.AUTO-MCNC: 95 MG/DL (ref 65–99)
GLUCOSE BLD STRIP.AUTO-MCNC: 97 MG/DL (ref 65–99)
GLUCOSE SERPL-MCNC: 93 MG/DL (ref 65–99)
HDLC SERPL-MCNC: 31 MG/DL
LDLC SERPL CALC-MCNC: 39 MG/DL
MAGNESIUM SERPL-MCNC: 2.1 MG/DL (ref 1.5–2.5)
POTASSIUM SERPL-SCNC: 4.7 MMOL/L (ref 3.6–5.5)
PROT SERPL-MCNC: 7.5 G/DL (ref 6–8.2)
SODIUM SERPL-SCNC: 138 MMOL/L (ref 135–145)
TRIGL SERPL-MCNC: 68 MG/DL (ref 0–149)
TROPONIN T SERPL-MCNC: 37 NG/L (ref 6–19)
TSH SERPL DL<=0.005 MIU/L-ACNC: 1.63 UIU/ML (ref 0.38–5.33)
VIT B12 SERPL-MCNC: 720 PG/ML (ref 211–911)

## 2023-10-12 PROCEDURE — 70551 MRI BRAIN STEM W/O DYE: CPT

## 2023-10-12 PROCEDURE — 80053 COMPREHEN METABOLIC PANEL: CPT

## 2023-10-12 PROCEDURE — 97162 PT EVAL MOD COMPLEX 30 MIN: CPT

## 2023-10-12 PROCEDURE — 99233 SBSQ HOSP IP/OBS HIGH 50: CPT | Performed by: INTERNAL MEDICINE

## 2023-10-12 PROCEDURE — A9270 NON-COVERED ITEM OR SERVICE: HCPCS | Performed by: INTERNAL MEDICINE

## 2023-10-12 PROCEDURE — 92523 SPEECH SOUND LANG COMPREHEN: CPT

## 2023-10-12 PROCEDURE — A9270 NON-COVERED ITEM OR SERVICE: HCPCS | Performed by: NURSE PRACTITIONER

## 2023-10-12 PROCEDURE — 92610 EVALUATE SWALLOWING FUNCTION: CPT

## 2023-10-12 PROCEDURE — 82607 VITAMIN B-12: CPT

## 2023-10-12 PROCEDURE — 84484 ASSAY OF TROPONIN QUANT: CPT

## 2023-10-12 PROCEDURE — 51798 US URINE CAPACITY MEASURE: CPT

## 2023-10-12 PROCEDURE — 84443 ASSAY THYROID STIM HORMONE: CPT

## 2023-10-12 PROCEDURE — 36415 COLL VENOUS BLD VENIPUNCTURE: CPT

## 2023-10-12 PROCEDURE — 700102 HCHG RX REV CODE 250 W/ 637 OVERRIDE(OP): Performed by: INTERNAL MEDICINE

## 2023-10-12 PROCEDURE — 80061 LIPID PANEL: CPT

## 2023-10-12 PROCEDURE — 82962 GLUCOSE BLOOD TEST: CPT | Mod: 91

## 2023-10-12 PROCEDURE — 97166 OT EVAL MOD COMPLEX 45 MIN: CPT

## 2023-10-12 PROCEDURE — G0378 HOSPITAL OBSERVATION PER HR: HCPCS

## 2023-10-12 PROCEDURE — 83735 ASSAY OF MAGNESIUM: CPT

## 2023-10-12 PROCEDURE — 700102 HCHG RX REV CODE 250 W/ 637 OVERRIDE(OP): Performed by: NURSE PRACTITIONER

## 2023-10-12 RX ORDER — CALCITRIOL 0.25 UG/1
0.25 CAPSULE, LIQUID FILLED ORAL EVERY MORNING
Status: DISCONTINUED | OUTPATIENT
Start: 2023-10-12 | End: 2023-10-18 | Stop reason: HOSPADM

## 2023-10-12 RX ADMIN — ACETAMINOPHEN 325 MG: 325 TABLET, FILM COATED ORAL at 16:48

## 2023-10-12 RX ADMIN — APIXABAN 2.5 MG: 5 TABLET, FILM COATED ORAL at 05:28

## 2023-10-12 RX ADMIN — ATORVASTATIN CALCIUM 80 MG: 80 TABLET, FILM COATED ORAL at 21:35

## 2023-10-12 RX ADMIN — TAMSULOSIN HYDROCHLORIDE 0.8 MG: 0.4 CAPSULE ORAL at 21:35

## 2023-10-12 RX ADMIN — APIXABAN 2.5 MG: 5 TABLET, FILM COATED ORAL at 16:48

## 2023-10-12 RX ADMIN — CALCITRIOL CAPSULES 0.25 MCG 0.25 MCG: 0.25 CAPSULE ORAL at 15:12

## 2023-10-12 RX ADMIN — FINASTERIDE 5 MG: 5 TABLET, FILM COATED ORAL at 05:27

## 2023-10-12 RX ADMIN — ACETAMINOPHEN 325 MG: 325 TABLET, FILM COATED ORAL at 05:28

## 2023-10-12 ASSESSMENT — COGNITIVE AND FUNCTIONAL STATUS - GENERAL
WALKING IN HOSPITAL ROOM: TOTAL
MOVING FROM LYING ON BACK TO SITTING ON SIDE OF FLAT BED: UNABLE
STANDING UP FROM CHAIR USING ARMS: A LOT
DRESSING REGULAR LOWER BODY CLOTHING: A LOT
DAILY ACTIVITIY SCORE: 12
DRESSING REGULAR UPPER BODY CLOTHING: A LOT
HELP NEEDED FOR BATHING: A LOT
TURNING FROM BACK TO SIDE WHILE IN FLAT BAD: UNABLE
MOBILITY SCORE: 7
PERSONAL GROOMING: A LOT
SUGGESTED CMS G CODE MODIFIER MOBILITY: CM
CLIMB 3 TO 5 STEPS WITH RAILING: TOTAL
EATING MEALS: A LOT
MOVING TO AND FROM BED TO CHAIR: UNABLE
SUGGESTED CMS G CODE MODIFIER DAILY ACTIVITY: CL
TOILETING: A LOT

## 2023-10-12 ASSESSMENT — ENCOUNTER SYMPTOMS
DIARRHEA: 0
NAUSEA: 0
FOCAL WEAKNESS: 1
DOUBLE VISION: 0
COUGH: 0
PALPITATIONS: 0
WEIGHT LOSS: 0
FEVER: 0
CLAUDICATION: 0
WEAKNESS: 1
SPEECH CHANGE: 1
HEMOPTYSIS: 0
MYALGIAS: 0
VOMITING: 0
BLURRED VISION: 0
ABDOMINAL PAIN: 0
PHOTOPHOBIA: 0
NECK PAIN: 0
CONSTIPATION: 0
ORTHOPNEA: 0
CHILLS: 0
DIZZINESS: 0

## 2023-10-12 ASSESSMENT — ACTIVITIES OF DAILY LIVING (ADL): TOILETING: REQUIRES ASSIST

## 2023-10-12 ASSESSMENT — PAIN DESCRIPTION - PAIN TYPE: TYPE: ACUTE PAIN

## 2023-10-12 ASSESSMENT — GAIT ASSESSMENTS: GAIT LEVEL OF ASSIST: UNABLE TO PARTICIPATE

## 2023-10-12 ASSESSMENT — FIBROSIS 4 INDEX: FIB4 SCORE: 2.36

## 2023-10-12 NOTE — ED NOTES
Bedside report received from off going RN: Jelly, assumed care of patient.  POC discussed with patient. Call light within reach, all needs addressed at this time.       Fall risk interventions in place: Move the patient closer to the nurse's station, Patient's personal possessions are with in their safe reach, Place socks on patient, Place fall risk sign on patient's door, and Give patient urinal if applicable (all applicable per [place] Fall risk assessment)   Continuous monitoring: Cardiac Leads, Pulse Ox, or Blood Pressure  IVF/IV medications: Not Applicable   Oxygen: Room Air  Bedside sitter: Not Applicable   Isolation: Not Applicable

## 2023-10-12 NOTE — THERAPY
Speech Language Pathology   Clinical Swallow Evaluation     Patient Name: Brannon Silver  AGE:  68 y.o., SEX:  male  Medical Record #: [id number]  Date of Service: 10/12/2023      History of Present Illness  68 y.o. male who presented on 10/11 with right sided weakness, right sided facial droop, and AMS.    PMHx: CVA in 2019, Type II diabetes, CKD stage IV, brain cancer s/p craniotomy with cognitive decline    CXR:   1.  Hypoinflation with minimal bibasilar atelectasis  2.  No pneumonia or pneumothorax.  3.  Stable cardiomegaly.    CT-Head:   1.  No evidence of acute territorial infarct, intracranial hemorrhage or mass lesion.  2.  Moderate diffuse cerebral substance loss.  3.  Advanced microangiopathic ischemic change versus demyelination or gliosis.  4.  Areas of encephalomalacia in the right frontal and right occipital lobe which may related to previous infarct or trauma.      General Information:  Vitals  O2 Delivery Device: None - Room Air  Level of Consciousness: Alert, Awake  Orientation: Self, General place, Current month, Current year  Follows Directives: Inconsistent (Related to being uncooperative with certain tasks)      Prior Living Situation & Level of Function:  Housing / Facility: Group Home  Lives with - Patient's Self Care Capacity: Attendant / Paid Care Giver  Swallowing: WFL       Oral Mechanism Evaluation:  Dentition: Edentulous   Facial Symmetry: Pt did not follow commands to assess  Labial Observations: WFL   Lingual Observations: Pt did not follow commands to assess  Motor Speech: WFL          Laryngeal Function:  Secretion Management: Adequate  Voice Quality: WFL  Cough: Perceptually WNL         Subjective  RN cleared patient for clinical swallow evaluation. Pt received awake, alert, upright in bed, demanding food. Impulsivity and inappropriate comments appreciated throughout session. Limited participation in oral mechanism evaluation d/t unwillingness to participate and instead requesting  "to be given food. Pt is edentulous and reported that he used to have full dentures but lost them; therefore he consumes food without dentures in discharge setting. During evaluation, pt stated \"did you know that I am part cannibal?\"      Assessment  Current Method of Nutrition: Oral diet (Regular solids/thin liquids)  Positioning: Blackmon's (60-90 degrees)  Bolus Administration: Patient  O2 Delivery Device: None - Room Air  Factor(s) Affecting Performance: None  Tracheostomy : No      Swallowing Trials:  Swallowing Trials  Thin Liquid (TN0): WFL  Liquidised (LQ3): WFL  Soft & Bite Sized (SB6): WFL      Comments: Pt able to self-feed without difficulty. Demo'd adequate oral bolus acceptance, labial assimilation, and oral bolus containment. Unable to determine presence of oral bolus residue as pt refused to open oral cavity for inspection. Prolonged mastication of soft solids, suspect related to edentulous state. Pt refused trial of regular solids (juan cracker), stating \"fuck a cracker.\" No cough/throat clear appreciated with trials. Vocal quality remained stable throughout oral intake. Single swallow completed per bolus. Provided education regarding general aspiration precautions as well as signs of aspiration.       Clinical Impressions  Patient presents with mild oral phase deficits characterized by prolonged mastication, suspect related to edentulous state. Recommend downgrade of oral diet to soft and bite sized solids with thin liquids. Due to impulsivity and oral phase deficits appreciated, service to follow 1-2x to ensure tolerance to diet. Please hold PO with any overt s/sx of aspiration or decline in respiratory status with oral intake.       Recommendations  Diet Consistency: Soft and bite sized solids/thin liquids  Instrumentation: None indicated at this time  Medication: As tolerated  Supervision: Monitor due to impulsive behavior  Positioning: Fully upright and midline during oral intake, Meals sitting " "upright in a chair, as tolerated  Risk Management : Small bites/sips, Slow rate of intake  Oral Care: BID         SLP Treatment Plan  Treatment Plan: Dysphagia Treatment  SLP Frequency: 2x Per Week  Estimated Duration: Until Therapy Goals Met      Anticipated Discharge Needs  Discharge Recommendations: Anticipate that the patient will have no further speech therapy needs after discharge from the hospital   Therapy Recommendations Upon DC: Not Indicated        Patient / Family Goals  Patient / Family Goal #1: \"give me food\"  Short Term Goals  Short Term Goal # 1: Pt will consume a diet of soft and bite sized solids with thin liquids with no overt s/sx of aspiration or decline in respiratory status      Ramy Link, JOSE   "

## 2023-10-12 NOTE — PROGRESS NOTES
Bedside report completed with José Miguel MONTIEL. Assumed pt care. Pt currently lying awake in bed. On 2LNC. Pt A&Ox3, disoriented to situation. Denies any pain. Will continue to monitor.

## 2023-10-12 NOTE — PROGRESS NOTES
[facility] Medicine Daily Progress Note    Date of Service  10/12/2023    Chief Complaint  Brannon Silver is a 68 y.o. male admitted 10/11/2023 with right-sided weakness    Hospital Course  68-year-old male with complicated history of diabetes, brain cancer? chronic kidney disease, coronary artery disease, hypertension and prostatic cardiac valve on Eliquis with a AAA who presented 10/11 with possible stroke.  Patient lives in a group home, patient not able to provide good history due to cognitive disorder.  Patient has history of for brain cancer and treated with a craniotomy.  Patient complained of right-sided weakness with slurred speech and worsening confusion.  On admission patient right-sided weakness  some with facial droop.  CT scan of her head did not show any acute finding or bleeding, CTA head and neck did not show any stenosis, EKG showed sinus rhythm with no arrhythmia.  Troponin was mildly elevated at 35.  Neurology evaluated the patient and recommended MRI, patient was not a candidate for tPA.        Interval Problem Update  -Evaluated and examined the patient at bedside, patient is alert oriented x3 however not cooperative with exam,  -Patient is on room air, blood pressure is stable  -Continue Eliquis and waiting for MRI result  -PT and OT evaluation      I have discussed this patient's plan of care and discharge plan at IDT rounds today with Case Management, Nursing, Nursing leadership, and other members of the IDT team.    Consultants/Specialty  neurology    Code Status  Prior    Disposition  The patient is not medically cleared for discharge to home or a post-acute facility.  Anticipate discharge to: skilled nursing facility    I have placed the appropriate orders for post-discharge needs.    Review of Systems  Review of Systems   Constitutional:  Negative for chills, fever and weight loss.   HENT:  Negative for ear pain, hearing loss and tinnitus.    Eyes:  Negative for blurred vision, double  vision and photophobia.   Respiratory:  Negative for cough and hemoptysis.    Cardiovascular:  Negative for chest pain, palpitations, orthopnea and claudication.   Gastrointestinal:  Negative for abdominal pain, constipation, diarrhea, nausea and vomiting.   Genitourinary:  Negative for dysuria, frequency and urgency.   Musculoskeletal:  Negative for myalgias and neck pain.   Skin:  Negative for rash.   Neurological:  Positive for speech change, focal weakness and weakness. Negative for dizziness.        Physical Exam  Temp:  [36.4 °C (97.5 °F)-36.7 °C (98.1 °F)] 36.4 °C (97.5 °F)  Pulse:  [] 94  Resp:  [16-24] 16  BP: ()/() 103/75  SpO2:  [95 %-100 %] 100 %    Physical Exam  Constitutional:       Appearance: He is not ill-appearing.      Comments: Patient is not very cooperative with exam   Eyes:      General: No scleral icterus.  Cardiovascular:      Rate and Rhythm: Normal rate.      Heart sounds: No murmur heard.  Pulmonary:      Effort: No respiratory distress.      Breath sounds: No wheezing.   Abdominal:      General: Bowel sounds are normal. There is no distension.      Palpations: Abdomen is soft.      Tenderness: There is no abdominal tenderness. There is no right CVA tenderness, left CVA tenderness or guarding.   Musculoskeletal:      Right lower leg: No edema.      Left lower leg: No edema.   Lymphadenopathy:      Cervical: No cervical adenopathy.   Skin:     Coloration: Skin is not jaundiced.      Findings: No bruising, lesion or rash.   Neurological:      General: No focal deficit present.      Mental Status: He is alert and oriented to person, place, and time. Mental status is at baseline.      Cranial Nerves: No cranial nerve deficit.      Motor: Weakness present.      Gait: Gait abnormal.         Fluids    Intake/Output Summary (Last 24 hours) at 10/12/2023 1440  Last data filed at 10/12/2023 1300  Gross per 24 hour   Intake --   Output 350 ml   Net -350 ml       Laboratory  Recent  Labs     10/11/23  1604   WBC 6.1   RBC 4.93   HEMOGLOBIN 14.0   HEMATOCRIT 42.8   MCV 86.8   MCH 28.4   MCHC 32.7   RDW 47.3   PLATELETCT 185   MPV 11.0     Recent Labs     10/11/23  1604 10/12/23  0120   SODIUM 141 138   POTASSIUM 4.1 4.7   CHLORIDE 106 105   CO2 22 21   GLUCOSE 178* 93   BUN 48* 48*   CREATININE 3.05* 3.22*   CALCIUM 10.7* 9.5     Recent Labs     10/11/23  1604   APTT 30.1   INR 1.18*         Recent Labs     10/12/23  0120   TRIGLYCERIDE 68   HDL 31*   LDL 39       Imaging  DX-CHEST-PORTABLE (1 VIEW)   Final Result      1.  Hypoinflation with minimal bibasilar atelectasis   2.  No pneumonia or pneumothorax.   3.  Stable cardiomegaly.      CT-CEREBRAL PERFUSION ANALYSIS   Final Result      1.  Cerebral blood flow less than 30% likely representing completed infarct = 0 mL.      2.  T Max more than 6 seconds likely representing combination of completed infarct and ischemia = 0 mL.      3.  Mismatched volume likely representing ischemic brain/penumbra = 0 mL.      Please note that the cerebral perfusion was performed on the limited brain tissue around the basal ganglia region. Infarct/ischemia outside the CT perfusion sections can be missed in this study.      CT-CTA NECK WITH & W/O-POST PROCESSING   Final Result      No focal high-grade stenosis, dissection or occlusion of the cervical carotid or vertebral arteries.      CT-CTA HEAD WITH & W/O-POST PROCESS   Final Result      No intracranial aneurysm, focal stenosis, or abrupt large vessel cut off.      CT-HEAD W/O   Final Result      1.  No evidence of acute territorial infarct, intracranial hemorrhage or mass lesion.   2.  Moderate diffuse cerebral substance loss.   3.  Advanced microangiopathic ischemic change versus demyelination or gliosis.   4.  Areas of encephalomalacia in the right frontal and right occipital lobe which may related to previous infarct or trauma.         MR-BRAIN-W/O    (Results Pending)        Assessment/Plan  * Stroke-like  symptom- (present on admission)  Assessment & Plan  Reported right sided weakness and facial droop. Hx multiple CVAs, noted on head CT  CT head did not show any acute finding and CTA for head and neck did not show any stenosis  Waiting for MRI  Not candidate for tPA, neurology on board   PT and OT   neurochecks every 4 hours        Cognitive disorder  Assessment & Plan  Patient is alert and oriented x3 however patient is not answering all questions, not cooperative with exam, patient might have some psychosis versus declining on cognitive disorder and memory problems, waiting for MRI.    Type 2 diabetes, diet controlled (HCC)- (present on admission)  Assessment & Plan  Last A1c 6.4 on 8/4/23. Diet controlled  Sliding scale    Elevated troponin- (present on admission)  Assessment & Plan  Trop T mildly elevated at 35, possibly secondary to FARZAD on CKD stage 4. Patient is asymptomatic, EKG with no acute ST-T changes  Telemetry, denied any chest pain, continue Eliquis    Acute renal failure with acute tubular necrosis superimposed on chronic kidney disease (HCC)- (present on admission)  Assessment & Plan  History of CKD stage 4. Cr on 8/4/23 was 2.7 with eGFR 25. Cr 3.05 with eGFR 21 at presentation today.  Avoid nephrotoxic medications  Labs daily    H/O aortic valve replacement- (present on admission)  Assessment & Plan  Chronically on Eliquis      Brain tumor (HCC)- (present on admission)  Assessment & Plan  We will order MRI for brain    Essential hypertension- (present on admission)  Assessment & Plan  Holding lisinopril due to worsening creatinine  Continue metoprolol    CAD (coronary artery disease)- (present on admission)  Assessment & Plan  S/p stent placement  Continue home medication metoprolol and Eliquis with atorvastatin  Telemetry, denied any chest pain    Severe aortic stenosis- (present on admission)  Assessment & Plan  Aortic valve stenosis  On Eliquis         VTE prophylaxis:   SCDs/TEDs   therapeutic  anticoagulation with eliquis 5 mg BID      I have performed a physical exam and reviewed and updated ROS and Plan today (10/12/2023). In review of yesterday's note (10/11/2023), there are no changes except as documented above.      Greater than 51 minutes spent prepping to see patient (e.g. review of tests) obtaining and/or reviewing separately obtained history. Performing a medically appropriate examination and/ evaluation.  Counseling and educating the patient/family/caregiver.  Ordering medications, tests, or procedures.  Referring and communicating with other health care professionals.  Documenting clinical information in EPIC.  Independently interpreting results and communicating results to patient/family/caregiver.  Care coordination

## 2023-10-12 NOTE — H&P
Hospital Medicine History & Physical Note    Date of Service  10/11/2023    Primary Care Physician  Pcp Not In Computer    Consultants  neurology    Specialist Names: TUAN Shepard    Code Status  Prior    Chief Complaint  Chief Complaint   Patient presents with    Possible Stroke       History of Presenting Illness  Brannon Silver is a 68 y.o. male who lives in a group home and has a past medical history of diabetes type 2, CKD stage IV, CAD, HTN, prostatic cardiac valve, chronic anticoagulation on Eliquis, dilated cardiomyopathy, AAA, multiple CVAs, vitamin D deficiency, history of brain cancer status post craniotomy with cognitive decline. The patient presented to the ED on 10/11/2023 with complaints of right-sided weakness, confusion and slurred speech.  The patient was noted to be at his baseline around 2 PM today when he went to his outpatient nephrology appointment.  However, when he returned to his group home around 330, he was noted to be more confused than normal with right-sided weakness. EMS was called and patient brought to the ED for further evaluation. ERP evaluation reported to have possible right-sided facial droop, and right arm weakness. On my exam, no facial droop is noted, + RLE weakness. Patient tells me he is normally not able to walk due to leg weakness bilaterally. He uses a motorized WC and transfers with a slide board.     CT head is negative for acute findings, but shows multiple areas of old infarcts. CTA head and neck showed no significant stenosis. EKG is ST, 107 bpm, negative for acute ST-T changes or significant arrhythmia. Troponin is mildly elevated at 35. CBC is unremarkable. CMP is significant for glucose 178, BUN 48, creatinine 3.05, GFR 21, calcium 10.7.       I discussed the plan of care with patient.    Review of Systems  Review of Systems   Constitutional:  Negative for chills, diaphoresis, fever, malaise/fatigue and weight loss.   HENT:  Negative for congestion, ear  pain, sinus pain and sore throat.    Eyes: Negative.    Respiratory:  Negative for cough, hemoptysis, shortness of breath and wheezing.    Cardiovascular:  Negative for chest pain, palpitations, orthopnea and leg swelling.   Gastrointestinal:  Negative for abdominal pain, blood in stool, constipation, diarrhea, heartburn, melena, nausea and vomiting.   Genitourinary:  Negative for dysuria, flank pain, frequency, hematuria and urgency.   Musculoskeletal:  Negative for joint pain and myalgias.   Skin:  Negative for itching and rash.   Neurological:  Positive for focal weakness (right sided weakness). Negative for dizziness and headaches.   Psychiatric/Behavioral:  Negative for depression. The patient is not nervous/anxious.        Past Medical History   has a past medical history of History of brain cancer, Hypertension, and Valvular heart disease.  diabetes type 2, CKD stage IV, CAD, HTN, prostatic cardiac valve, chronic anticoagulation on Eliquis, dilated cardiomyopathy, AAA, multiple CVAs, vitamin D deficiency, history of brain cancer status post craniotomy with cognitive decline  Surgical History   has a past surgical history that includes craniotomy.     Family History  Family history reviewed with patient. There is no family history that is pertinent to the chief complaint.     Social History   reports that he quit smoking about 15 years ago. He has never used smokeless tobacco.    Allergies  Allergies   Allergen Reactions    Pcn [Penicillins]        Medications  Prior to Admission Medications   Prescriptions Last Dose Informant Patient Reported? Taking?   Multiple Vitamin (MULTIVITAMIN ADULT) Tab 10/11/2023 at 0800 Caregiver Yes Yes   Sig: Take 1 Tablet by mouth every morning.   acetaminophen (TYLENOL) 325 MG Tab 10/11/2023 at 0800 Caregiver Yes Yes   Sig: Take 325 mg by mouth 2 times a day.   apixaban (ELIQUIS) 2.5mg Tab 10/11/2023 at 0800 Caregiver Yes Yes   Sig: Take 2.5 mg by mouth 2 times a day.    atorvastatin (LIPITOR) 80 MG tablet 10/10/2023 at HS Caregiver No No   Sig: Take 1 Tab by mouth every evening.   benzonatate (TESSALON) 100 MG Cap 10/4/2023 at PRN Caregiver Yes Yes   Sig: Take 100 mg by mouth 3 times a day as needed for Cough.   bumetanide (BUMEX) 2 MG tablet 10/11/2023 at 0800 Caregiver Yes Yes   Sig: Take 2 mg by mouth every morning.   calcitRIOL (ROCALTROL) 0.25 MCG Cap 10/11/2023 at 0800 Caregiver Yes Yes   Sig: Take 0.25 mcg by mouth every morning.   eplerenone (INSPRA) 25 MG Tab 10/11/2023 at 0800 Caregiver Yes Yes   Sig: Take 12.5 mg by mouth every morning.   ferrous sulfate 325 (65 Fe) MG tablet 10/10/2023 at Am Caregiver Yes No   Sig: Take 325 mg by mouth 3 times a week. Monday, tuesday & saturday   finasteride (PROSCAR) 5 MG Tab 10/11/2023 at 0800 Caregiver Yes No   Sig: Take 5 mg by mouth every morning.   guaiFENesin (ROBITUSSIN) 100 MG/5ML liquid 10/11/2023 at 0800 Caregiver Yes Yes   Sig: Take 10 mL by mouth every morning.   lisinopril (PRINIVIL) 10 MG Tab 10/11/2023 at 0800 Caregiver Yes Yes   Sig: Take 10 mg by mouth every morning.   metoprolol SR (TOPROL XL) 50 MG TABLET SR 24 HR 10/11/2023 at 0800 Caregiver Yes Yes   Sig: Take 50 mg by mouth every morning.   tamsulosin (FLOMAX) 0.4 MG capsule 10/10/2023 at PM Caregiver Yes No   Sig: Take 0.8 mg by mouth every evening.   vitamin D3 (CHOLECALCIFEROL) 1000 Unit (25 mcg) Tab 10/11/2023 at 0800 Caregiver Yes Yes   Sig: Take 1,000 Units by mouth every morning.      Facility-Administered Medications: None       Physical Exam  Pulse:  [101-107] 101  Resp:  [16-18] 18  BP: (118-120)/(61-81) 118/81  SpO2:  [95 %-98 %] 98 %  Blood Pressure : 120/61       Pulse: (!) 107   Respiration: 16   Pulse Oximetry: 95 %       Physical Exam  Vitals and nursing note reviewed.   Constitutional:       General: He is not in acute distress.     Appearance: Normal appearance. He is not ill-appearing.   HENT:      Head: Normocephalic and atraumatic.       "Nose: Nose normal. No congestion or rhinorrhea.      Mouth/Throat:      Mouth: Mucous membranes are dry.      Pharynx: Oropharynx is clear.   Eyes:      Extraocular Movements: Extraocular movements intact.      Pupils: Pupils are equal, round, and reactive to light.   Cardiovascular:      Rate and Rhythm: Normal rate and regular rhythm.      Pulses: Normal pulses.      Heart sounds: Normal heart sounds. No murmur heard.     No gallop.   Pulmonary:      Effort: Pulmonary effort is normal. No respiratory distress.      Breath sounds: Normal breath sounds. No wheezing, rhonchi or rales.   Abdominal:      General: There is no distension.      Palpations: Abdomen is soft.      Tenderness: There is no abdominal tenderness. There is no guarding.   Musculoskeletal:         General: Normal range of motion.      Cervical back: Normal range of motion.   Skin:     General: Skin is warm and dry.      Capillary Refill: Capillary refill takes less than 2 seconds.      Findings: No lesion or rash.   Neurological:      Mental Status: He is alert and oriented to person, place, and time.      Motor: Weakness (RLE 2-3/5) present.      Comments: CN 2-12 normal   Psychiatric:         Mood and Affect: Mood normal.         Behavior: Behavior normal.         Laboratory:  Recent Labs     10/11/23  1604   WBC 6.1   RBC 4.93   HEMOGLOBIN 14.0   HEMATOCRIT 42.8   MCV 86.8   MCH 28.4   MCHC 32.7   RDW 47.3   PLATELETCT 185   MPV 11.0     Recent Labs     10/11/23  1604   SODIUM 141   POTASSIUM 4.1   CHLORIDE 106   CO2 22   GLUCOSE 178*   BUN 48*   CREATININE 3.05*   CALCIUM 10.7*     Recent Labs     10/11/23  1604   ALTSGPT 14   ASTSGOT 19   ALKPHOSPHAT 150*   TBILIRUBIN 0.3   GLUCOSE 178*     Recent Labs     10/11/23  1604   APTT 30.1   INR 1.18*     No results for input(s): \"NTPROBNP\" in the last 72 hours.      Recent Labs     10/11/23  1604   TROPONINT 35*       Imaging:  DX-CHEST-PORTABLE (1 VIEW)   Final Result      1.  Hypoinflation with " minimal bibasilar atelectasis   2.  No pneumonia or pneumothorax.   3.  Stable cardiomegaly.      CT-CEREBRAL PERFUSION ANALYSIS   Final Result      1.  Cerebral blood flow less than 30% likely representing completed infarct = 0 mL.      2.  T Max more than 6 seconds likely representing combination of completed infarct and ischemia = 0 mL.      3.  Mismatched volume likely representing ischemic brain/penumbra = 0 mL.      Please note that the cerebral perfusion was performed on the limited brain tissue around the basal ganglia region. Infarct/ischemia outside the CT perfusion sections can be missed in this study.      CT-CTA NECK WITH & W/O-POST PROCESSING   Final Result      No focal high-grade stenosis, dissection or occlusion of the cervical carotid or vertebral arteries.      CT-CTA HEAD WITH & W/O-POST PROCESS   Final Result      No intracranial aneurysm, focal stenosis, or abrupt large vessel cut off.      CT-HEAD W/O   Final Result      1.  No evidence of acute territorial infarct, intracranial hemorrhage or mass lesion.   2.  Moderate diffuse cerebral substance loss.   3.  Advanced microangiopathic ischemic change versus demyelination or gliosis.   4.  Areas of encephalomalacia in the right frontal and right occipital lobe which may related to previous infarct or trauma.         MR-BRAIN-W/O    (Results Pending)       EKG 10/11/23  Measurements   Intervals                                Axis   Rate:       107                          P:          46   LA:         177                          QRS:     -35   QRSD:     91                           T:          91   QT:         358   QTc:        478     Interpretive Statements   Sinus tachycardia, Rate of 107, leftward axis, no ST elevation   Probable left atrial enlargement   Abnormal R-wave progression, early transition   Inferior infarct, old   Compared to ECG 06/30/2019 16:00:01   Myocardial infarct finding now present   Sinus rhythm no longer present    Prolonged QT interval no longer present    ASSESSMENT/PLAN:  * Stroke-like symptom- (present on admission)  Assessment & Plan  Reported right sided weakness and facial droop. Hx multiple CVAs, noted on head CT  - Telemetry monitor  - Neurochecks and NIH per protocol  - Lipid panel, A1c, TSH, vitamin B12, UA  - MRI brain without  - Echocardiogram with bubble  - PT/OT/ST  - Continue high dose statin  - N.p.o. until passes bedside swallow    Acute renal failure with acute tubular necrosis superimposed on chronic kidney disease (HCC)- (present on admission)  Assessment & Plan  History of CKD stage 4. Cr on 8/4/23 was 2.7 with eGFR 25. Cr 3.05 with eGFR 21 at presentation today.  - Gentle IV hydration  - Avoid nephrotoxic agents  - hold cacitriol due to mildly elevated caclium  - repeat labs in am    CAD (coronary artery disease)- (present on admission)  Assessment & Plan  S/p stent placement  - continue home meds  - Continue statin    Essential hypertension- (present on admission)  Assessment & Plan  - Hold lisinopril for now due to FARZAD on CKD  - continue other antihypertensives    Type 2 diabetes, diet controlled (HCC)- (present on admission)  Assessment & Plan  Last A1c 6.4 on 8/4/23. Diet controlled  - Accuchecks achs overnight  - DM diet once bedside swallow eval passed    Elevated troponin- (present on admission)  Assessment & Plan  Trop T mildly elevated at 35, possibly secondary to FARZAD on CKD stage 4. Patient is asymptomatic, EKG with no acute ST-T changes  - telemonitor  - trend troponinI    H/O aortic valve replacement- (present on admission)  Assessment & Plan  Chronically on Eliquis  - continue Eliquis    Justification for Admission Status  I anticipate this patient is appropriate for observation status at this time because patient requires further workup for possible CVA with echo, MRI brain    Patient will need a Telemetry bed on MEDICAL service. The need is secondary to evaluating arrhythmia as possibly  contributing to CVA symptoms.    VTE prophylaxis: SCDs/TEDs and therapeutic anticoagulation with Eliquis

## 2023-10-12 NOTE — THERAPY
Physical Therapy   Initial Evaluation     Patient Name: Brannon Silver  Age:  68 y.o., Sex:  male  Medical Record #: [id number]  Today's Date: 10/12/2023     Precautions  Precautions: Fall Risk    Assessment  Patient is 68 y.o. male admitted with R weakness, confusion, and slurred speech.  CT negative, MRI pending.  PMH includes DM2, CKD stage IV, CAD, HTN, dilated cardiomyopathy, AAA, CVAs, and brain cancer s/p craniotomy with cognitive decline.  Pt presented for acute PT evaluation with impaired cognition, balance, activity tolerance, and strength.  He mobilized as detailed below with mod A for supine <> sit.  Pt reported he normally mobilizes with 2 person assist at his group home.  Pt reported he feels near his baseline level of functioning.  Will follow to progress functional mobility & reduce caregiver burden.  Would benefit from confirmation of PLOF from group home.      Plan    Physical Therapy Initial Treatment Plan   Treatment Plan : Bed Mobility, Equipment, Neuro Re-Education / Balance, Therapeutic Activities, Self Care / Home Evaluation, Therapeutic Exercise  Treatment Frequency: 3 Times per Week  Duration: Until Therapy Goals Met    DC Equipment Recommendations: None  Discharge Recommendations: Recommend home health for continued physical therapy services     Objective     10/12/23 1428   Precautions   Precautions Fall Risk   Pain 0 - 10 Group   Therapist Pain Assessment During Activity;Nurse Notified (Not quantified)   Prior Living Situation   Prior Services Continuous (24 Hour) Care Giving Per Service (Per pt report, unclear if reliable)   Housing / Facility Group Home   Equipment Owned Power Wheel Chair;Slide Board   Lives with - Patient's Self Care Capacity Attendant / Paid Care Giver   Prior Level of Functional Mobility   Bed Mobility Required Assist   Transfer Status Required Assist   Assistive Devices Used Power Wheel Chair (slideboard transfer)   Wheelchair Independent   Comments Pt reported he  "requires 2 person assistance for mobility   Cognition    Cognition / Consciousness X   Level of Consciousness Alert   Safety Awareness Impaired   New Learning Impaired   Sequencing Impaired   Initiation Impaired   Comments Cooperative, multiple tangential outbursts, e.g. \"Damn you!\" and growling at therapist at times   Active ROM Lower Body    Active ROM Lower Body  X   Comments RLE WFL except limited by hamstring tightness. L knee appears to be contracted at about 80-90 degrees. Pt reported L \"busted kneecap\" about 10 years ago.   Strength Lower Body   Lower Body Strength  X   Comments R knee extension 3/5, L knee extension appears 3/5 in available ROM. Unable to formally test due to pt behavior   Sensation Lower Body   Comments Denied N/T   Balance Assessment   Sitting Balance (Static) Fair -   Sitting Balance (Dynamic) Fair -   Weight Shift Sitting Poor   Bed Mobility    Supine to Sit Moderate Assist   Sit to Supine Moderate Assist   Scooting Moderate Assist   Gait Analysis   Gait Level Of Assist Unable to Participate   Functional Mobility   Sit to Stand Unable to Participate   Bed, Chair, Wheelchair Transfer Unable to Participate   Mobility EOB   Comments attempted lateral scooting at EOB - total A, pt not participating   Activity Tolerance   Sitting Edge of Bed 5+ min   Short Term Goals    Short Term Goal # 1 Pt will perform supine <> sit with min A in 6 visits to progress bed mobility   Short Term Goal # 2 Pt will perform slide board transfer with mod A in 6 visits to progress toward PLOF     "

## 2023-10-12 NOTE — DISCHARGE PLANNING
Received Choice form at [id number]  Agency/Facility Name: [facility]  Referral sent per Choice form @ [phone]

## 2023-10-12 NOTE — FACE TO FACE
Face to Face Supporting Documentation - Home Health    The encounter with this patient was in whole or in part the primary reason for home health admission.    Date of encounter:   Patient:                    MRN:                       YOB: 2023  Brannon Silver  [id number]  1955     Home health to see patient for:  Skilled Nursing care for assessment, interventions & education, Home health aide, Physical Therapy evaluation and treatment, and Occupational therapy evaluation and treatment    Skilled need for:  Exacerbation of Chronic Disease State cva    Skilled nursing interventions to include:  Venous access care and Line/Drain/Airway education and care    Homebound status evidenced by:  Need the aid of supportive devices such as crutches, canes, wheelchairs or walkers. Leaving home requires a considerable and taxing effort. There is a normal inability to leave the home.    Community Physician to provide follow up care: Pcp Not In Computer     Optional Interventions? No      I certify the face to face encounter for this home health care referral meets the CMS requirements and the encounter/clinical assessment with the patient was, in whole, or in part, for the medical condition(s) listed above, which is the primary reason for home health care. Based on my clinical findings: the service(s) are medically necessary, support the need for home health care, and the homebound criteria are met.  I certify that this patient has had a face to face encounter by myself.  Catherine French M.D. - CRISTOPHERI: [phone]

## 2023-10-12 NOTE — THERAPY
Occupational Therapy   Initial Evaluation     Patient Name: Brannon Silver  Age:  68 y.o., Sex:  male  Medical Record #: [id number]  Today's Date: 10/12/2023     Precautions: (P) Fall Risk    Assessment  Patient is 68 y.o. male admitted with R weakness, confusion, and slurred speech.  CT negative, MRI pending.  PMH includes DM2, CKD stage IV, CAD, HTN, dilated cardiomyopathy, AAA, CVAs, and brain cancer s/p craniotomy with cognitive decline.    Pt seen for OT evaluation and presented with impaired balance, impaired cognition, poor visual tracking, poor endurance, however likely close to baseline level of function. Pt's UE strength equal bilaterally and sensation in BUE WFL. Pt reports he has assist with all self cares at , will need to clarify. Anticipate pt can DC home with HHOT services if group home able to support pt at current functional level. Will follow to maximize function in acute setting.    Plan    Occupational Therapy Initial Treatment Plan   Treatment Interventions: (P) Self Care / Activities of Daily Living, Neuro Re-Education / Balance, Therapeutic Exercises, Therapeutic Activity  Treatment Frequency: (P) 3 Times per Week  Duration: (P) Until Therapy Goals Met    DC Equipment Recommendations: (P) Unable to determine at this time  Discharge Recommendations: (P) Recommend home health for continued occupational therapy services (if group home able to help with all self cares and OOB activities)      10/12/23 6858   Prior Living Situation   Prior Services Continuous (24 Hour) Care Giving Per Service   Housing / Facility Group Home   Equipment Owned Power Wheel Chair;Slide Board   Lives with - Patient's Self Care Capacity Attendant / Paid Care Giver   Comments will need to confirm, but reports he has assist with all ADLs (wears depends and uses urinal), and 2 person assist with transfers at group home   Prior Level of ADL Function   Self Feeding Independent   Grooming / Hygiene Requires Assist    Bathing Requires Assist   Dressing Requires Assist   Toileting Requires Assist   Prior Level of IADL Function   Medication Management Dependent   Laundry Dependent   Kitchen Mobility Dependent   Finances Dependent   Home Management Dependent   Shopping Dependent   Precautions   Precautions Fall Risk   Cognition    Cognition / Consciousness X   Level of Consciousness Alert   Safety Awareness Impaired   New Learning Impaired   Sequencing Impaired   Initiation Impaired   Comments   (agreeable, tangential and impulsive)   Active ROM Upper Body   Active ROM Upper Body  WDL   Comments ranges shoulder to 90 degrees, functional and likely baseline   Strength Upper Body   Upper Body Strength  WDL   Comments 5/5 strength bilaterally   Sensation Upper Body   Upper Extremity Sensation  WDL   Balance Assessment   Sitting Balance (Static) Fair -   Sitting Balance (Dynamic) Fair -   Weight Shift Sitting Poor   Bed Mobility    Supine to Sit Moderate Assist   Sit to Supine Moderate Assist   Scooting Moderate Assist   ADL Assessment   Grooming Moderate Assist  (with tunk support)   Lower Body Dressing Maximal Assist   Toileting Maximal Assist   How much help from another person does the patient currently need...   6 Clicks Daily Activity Score 12   mRS Prior to admission   Prior to admission mRS 4   Modified Timoteo (mRS)   Modified Timoteo Score 4   Functional Mobility   Sit to Stand Unable to Participate   Bed, Chair, Wheelchair Transfer Unable to Participate   Toilet Transfers Unable to Participate   Visual Perception   Visual Perception  X   Comments delayed tracking, unclear baseline   Patient / Family Goals   Patient / Family Goal #1 none stated   Short Term Goals   Short Term Goal # 1 Pt will tolerate 5-7 min EOB ADLs unsupported   Short Term Goal # 2 Pt will demo functional txf mod A   Education Group   Role of Occupational Therapist Patient Response Patient;Acceptance;Explanation   Occupational Therapy Initial Treatment Plan     Treatment Interventions Self Care / Activities of Daily Living;Neuro Re-Education / Balance;Therapeutic Exercises;Therapeutic Activity   Treatment Frequency 3 Times per Week   Duration Until Therapy Goals Met   Problem List   Problem List Decreased Active Daily Living Skills;Decreased Functional Mobility;Decreased Activity Tolerance;Impaired Postural Control / Balance;Impaired Vision   Anticipated Discharge Equipment and Recommendations   DC Equipment Recommendations Unable to determine at this time   Discharge Recommendations Recommend home health for continued occupational therapy services  (if group home able to help with all self cares and OOB activities)

## 2023-10-12 NOTE — HOSPITAL COURSE
68-year-old male with complicated history of diabetes, brain cancer? chronic kidney disease, coronary artery disease, hypertension and prostatic cardiac valve on Eliquis with a AAA who presented 10/11 with possible stroke.  Patient lives in a group home, patient not able to provide good history due to cognitive disorder.  Patient has history of for brain cancer and treated with a craniotomy.  Patient complained of right-sided weakness with slurred speech and worsening confusion.  On admission patient right-sided weakness  some with facial droop.  CT scan of her head did not show any acute finding or bleeding, CTA head and neck did not show any stenosis, EKG showed sinus rhythm with no arrhythmia.  Troponin was mildly elevated at 35.  Neurology evaluated the patient and recommended MRI, patient was not a candidate for tPA.

## 2023-10-12 NOTE — PROGRESS NOTES
4 Eyes Skin Assessment Completed by TIANA Valdez and TIANA Wade.    Head Scab and Scar  Ears Discoloration  Nose WDL  Mouth Redness  Neck WDL  Breast/Chest WDL  Shoulder Blades WDL  Spine WDL  (R) Arm/Elbow/Hand Redness and Blanching  (L) Arm/Elbow/Hand Discoloration  Abdomen WDL  Groin Blanching and Excoriation, Discoloration  Scrotum/Coccyx/Buttocks Redness, Blanching, and Discoloration  (R) Leg Redness, Abrasion, and Edema  (L) Leg Redness, Blanching, and Edema  (R) Heel/Foot/Toe Redness, Blanching, Discoloration, Boggy, and Scab, Flaky   (L) Heel/Foot/Toe Redness, Blanching, Discoloration, Boggy, and Scab, Flaky          Devices In Places Tele Box, Blood Pressure Cuff, Pulse Ox, and Nasal Cannula      Interventions In Place NC W/Ear Foams, Pillows, Q2 Turns, and Heels Loaded W/Pillows    Possible Skin Injury No    Pictures Uploaded Into Epic N/A  Wound Consult Placed N/A  RN Wound Prevention Protocol Ordered No

## 2023-10-12 NOTE — CARE PLAN
The patient is Stable - Low risk of patient condition declining or worsening    Shift Goals  Clinical Goals: Monitor neuro status  Patient Goals: Eat  Family Goals: NEELAM    Progress made toward(s) clinical / shift goals:      Problem: Optimal Care of the Stroke Patient  Goal: Optimal emergency care for the stroke patient  Description: Target End Date:  End of day 1    Time of Onset    1.  Time of last known well obtained  2.  Patient and family/caregiver verbalize understanding of diagnosis, medications and testing  3.  NIHSS performed and documented, including date and time, for ischemic stroke patients prior to any acute recanalization therapy (thrombolytics or mechanical) or within 12 hours of arrival if no intervention is warranted  4.  Consults and referrals placed to appropriate departments    Medications Administration as Ordered:    1.  Implement appropriate reversal agents for INR greater than 1.5  2.  Pre-alteplase administration of antihypertensives for SBP >185 DBP >110  3.  Post-alteplase administration of antihypertensives for SBP >185, DBP >105  4.  Thrombolytic Therapy for qualifying ischemic stroke patients who arrive within 4.5 hours of time of Last Known Well. Thrombolytic therapy administered within 30 minutes or a documented reason for delay  Outcome: Progressing  Goal: Optimal acute care for the stroke patient  Description: Target End Date:  1 to 3 days    - Vital signs and neuro checks performed and documented per order  - NIHSS completed and documented per order  - Continuous telemetry monitoring for 72 hours or until discontinued by provider  - Head CT without contrast obtained  - Consideration of MRI/MRA  - MRI screening form completed in worklist if MRI ordered  - Echocardiogram with Bubble Study ordered/completed with consideration of KUSUM  - Carotid Doppler ordered/completed (Not required if CTA of neck completed in ED)  - Lipid Panel obtained within 48 hours of admission  - PT, PTT, INR  obtained per Anticoagulation orders (if applicable)  - Antithrombotic therapy by end of hospital day 2 for ischemic stroke. Provider must document reason if contraindicated.  - Venous Thromboembolism (VTE) Prophylaxis by end of hospital day 2 for ischemic and hemorrhagic stroke. Provider must document reason if contraindicated  - Dysphagia screen completed and documented prior to any PO intake. Patient to remain NPO until Speech Therapy evaluation if thrombolytic or thrombectomy performed  - Rehabilitation assessment including PT/OT/SLP evaluations for referral to Physical Medicine and Rehabilitation services. If none needed, provider needs to document reason  - Neurology consult placed  - Consideration of cardiology consult for cryptogenic strokes  Outcome: Progressing     Problem: Knowledge Deficit - Stroke Education  Goal: Patient's knowledge of stroke and risk factors will improve  Description: Target End Date:  1-3 days or as soon as patient condition allows    Document in Patient Education    1.  Stroke education booklet provided  2.  Education regarding EMS activation, need for follow up, medication prescribed at discharge, risk factors for stroke/lifestyle modifications, warning signs and symptoms of stroke provided  Outcome: Progressing     Problem: Psychosocial - Patient Condition  Goal: Patient's ability to verbalize feelings about condition will improve  Description: Target End Date:  Prior to discharge or change in level of care    1.  Discuss coping with medical condition and its effects  2.  Encourage patient participation in care  3.  Encourage acknowledgement of body changes and accompanying emotions  4.  Perform depression screening  Outcome: Progressing  Goal: Patient's ability to re-evaluate and adapt role responsibilities will improve  Description: Target End Date:  Prior to discharge or change in level of care    1.  Assess family support  2.  Encourage support system participation in care  3.   Encouraged verbalization of feelings regarding caregiver responsibilities  4.  Discuss changes in role and responsibilities caused by patient's condition  Outcome: Progressing     Problem: Discharge Planning - Stroke  Goal: Ensure Stroke Core Measures are met prior to discharge  Description: Target End Date:  Prior to discharge or change in level of care    1. Patient discharged on antithrombotic therapy (Ischemic Stroke)  2. Patient discharged on intensive statin if LDL is greater than or equal to 70 mg/dl (Ischemic Stroke)  3. Patient discharged on anticoagulation therapy for patients with atrial fibrillation/flutter (Ischemic Stroke)  4. Smoking education/cessation provided if applicable  Outcome: Progressing  Goal: Patient’s continuum of care needs will be met  Description: Target End Date:  Prior to discharge or change in level of care    1.  Potential discharge barriers identified upon admission and throughout hospital stay  2.  Ensure appropriate referrals in place for follow up with specialists after discharge  3.  Ensure appropriate referrals in place for DMEs if applicable  4.  Collaboration with transitional care team and interdisciplinary team to meet discharge needs  5.  Involve patient and family/caregiver in prioritizing goals for discharge planning  6.  Educate patient and caregiver about discharge instructions, medications, and follow up appointments  7.  Referral placed to [facility]  8.  Assure orders and follow up appointments are made for outpatient extended cardiac monitoring if appropriate  Outcome: Progressing     Problem: Neuro Status  Goal: Neuro status will remain stable or improve  Description: Target End Date:  Prior to discharge or change in level of care    Document on Neuro assessment in the Assessment flowsheet    1.  Assess and monitor neurologic status per provider order/protocol/unit policy  2.  Assess level of consciousness and orientation  3.  Assess for speech,  dysarthria, dysphagia, facial symmetry  4.  Assess visual field, eye movements, gaze preference, pupil reaction and size  5.  Assess muscle strength and motor response in all four extremities  6.  Assess for sensation (numbness and tingling)  7.  Assess basic neuro reflexes (cough, gag, corneal)  8.  Identify changes in neuro status and report to provider for testing/treatment orders  Outcome: Progressing     Problem: Hemodynamic Monitoring  Goal: Patient's hemodynamics, fluid balance and neurologic status will be stable or improve  Description: Target End Date:  Prior to discharge or change in level of care    1.  Vital signs, pulse oximetry and cardiac monitor per provider order and/or policy  2.  Frequent pulse checks performed post thrombectomy  3.  Frequent monitoring for signs of bleeding post TPA administration  4.  Proper management of IV infusions  5.  Intake and output monitored per provider order  6.  Daily weight obtained per unit policy or provider order  7.  Peripheral pulses and capillary refill assessed as needed  8.  Monitor for signs/symptoms of excessive bleeding  9.  Body temperature assessed and fevers treated  10. Patient positioned for maximum circulation/cardiac output  Outcome: Progressing     Problem: Respiratory - Stroke Patient  Goal: Patient will achieve/maintain optimum respiratory rate/effort  Description: Target End Date:  Prior to discharge or change in level of care    Document on Assessment flowsheet    1.  Assess and monitor respiratory rate, rhythm, depth and effort of respiration  2.  Oxygenation assessed throughout shift (recommendation of >94% for new stroke patients)  3.  Oxygen administered and/or titrated per order  4.  Collaboration with RT to administer medication/treatments per order  5.  Patient educated on importance of turning, coughing, and deep breathing  6.  Patient positioned for maximum ventilatory efficiency  7.  Airway suctioning provided as needed  8.   Incentive spirometry encouraged 5-10 times every hour or while awake  Outcome: Progressing     Problem: Dysphagia  Goal: Dysphagia will improve  Description: Target End Date:  Prior to discharge or change in level of care    1.  Assess and monitor ability to swallow  2.  Collaborate with Speech Therapy to determine appropriate adaptation for safe administration of medications and oral nutrition  3.  Elevate head of bed to 90 degrees during feedings and for 30 minutes after each feeding  4.  Encourage proper swallowing techniques  5.  Screening on admission or as soon as possible  Outcome: Progressing     Problem: Risk for Aspiration  Goal: Patient's risk for aspiration will be absent or decrease  Description: Target End Date:  Prior to discharge or change in level of care    1.   Complete dysphagia screening on admission  2.   NPO until dysphagia screening complete or medically cleared  3.   Collaborate with Speech Therapy, Clinical Dietitian and interdisciplinary team  4.   Implement aspiration precautions  5.   Assist patient up to chair for meals  6.   Elevate head of bed 90 degrees if patient is unable to get out of bed  7.   Encourage small bites  8.   Ensure foods/liquids are of appropriate consistency  9.   Assess for any signs/symptoms of aspiration  10. Assess breath sounds and vital signs after oral intake  Outcome: Progressing     Problem: Urinary Elimination  Goal: Establish and maintain regular urinary output  Description: Target End Date:  Prior to discharge or change in level of care    Document on I/O and Assessment flowsheets    1.  Evaluate need to continue indwelling catheter every shift  2.  Assess signs and symptoms of urinary retention  3.  Assess post-void residual volumes  4.  Implement bladder training program  5.  Encourage scheduled voidings  6.  Assist patient to sit on bedside commode or toilet for voiding  7.  Educate patient and family/caregiver on use and purpose of urine collection  devices (document in Patient Education)  Outcome: Progressing     Problem: Bowel Elimination  Goal: Establish and maintain regular bowel function  Description: Target End Date:  Prior to discharge or change in level of care    1.   Note date of last BM  2.   Educate about diet, fluid intake, medication and activity to promote bowel function  3.   Educate signs and symptoms of constipation and interventions to implement  4.   Pharmacologic bowel management per provider order  5.   Regular toileting schedule  6.   Upright position for toileting  7.   High fiber diet  8.   Encourage hydration  9.   Collaborate with Clinical Dietician  10. Care and maintenance of ostomy if applicable  Outcome: Progressing     Problem: Mobility - Stroke  Goal: Patient's capacity to carry out activities will improve  Description: Target End Date:  Prior to discharge or change in level of care    1.  Assess for barriers to mobility/activity  2.  Implement activity per interdisciplinary team recommendations  3.  Target activity level identified and patient/family/caregiver aware of goal  4.  Provide assistive devices  5.  Instruct patient/caregiver on proper use of assistive/adaptive devices  6.  Schedule activities and rest periods to decrease effects of fatigue  7.  Encourage mobilization to extent of ability  8.  Maintain proper body alignment  9.  Provide adequate pain management to allow progressive mobilization  10. Implement pace maker precautions as needed  Outcome: Progressing  Goal: Spasticity will be prevented or improved  Description: Target End Date:  Prior to discharge or change in level of care    1.  Muscle relaxing agents considered or administered per order  2.  Splints applied properly and used accordingly to therapist's recommendations  3.  Assistance with stretching and range of motion exercises provided  Outcome: Progressing  Goal: Subluxation will be prevented or improved  Description: Target End Date:  Prior to  discharge or change in level of care    1.   Ensure proper handling during transfers, ambulation, and repositioning in bed  2.   Reduce traction to affected limb and provide adequate support of weight  3.   Perform passive range of motion  4.  Assist with active range of motion  Outcome: Progressing     Problem: Self Care  Goal: Patient will have the ability to perform ADLs independently or with assistance (bathe, groom, dress, toilet and feed)  Description: Target End Date:  Prior to discharge or change in level of care    Document on ADL flowsheet    1.  Assess the capability and level of deficiency to perform ADLs  2.  Encourage family/care giver involvement  3.  Provide assistive devices  4.  Consider PT/OT evaluations  5.  Maintain support, give positive feedback, encourage self-care allowing extra time and verbal cuing as needed  6.  Avoid doing something for patients they can do themselves, but provide assistance as needed  7.  Assist in anticipating/planning individual needs  8.  Collaborate with Case Management and  to meet discharge needs  Outcome: Progressing     Problem: Knowledge Deficit - Standard  Goal: Patient and family/care givers will demonstrate understanding of plan of care, disease process/condition, diagnostic tests and medications  Description: Target End Date:  1-3 days or as soon as patient condition allows    Document in Patient Education    1.  Patient and family/caregiver oriented to unit, equipment, visitation policy and means for communicating concern  2.  Complete/review Learning Assessment  3.  Assess knowledge level of disease process/condition, treatment plan, diagnostic tests and medications  4.  Explain disease process/condition, treatment plan, diagnostic tests and medications  Outcome: Progressing     Problem: Skin Integrity  Goal: Skin integrity is maintained or improved  Description: Target End Date:  Prior to discharge or change in level of care    Document  interventions on Skin Risk/Wilian flowsheet groups and corresponding LDA    1.  Assess and monitor skin integrity, appearance and/or temperature  2.  Assess risk factors for impaired skin integrity and/or pressures ulcers  3.  Implement precautions to protect skin integrity in collaboration with interdisciplinary team  4.  Implement pressure ulcer prevention protocol if at risk for skin breakdown  5.  Confirm wound care consult if at risk for skin breakdown  6.  Ensure patient use of pressure relieving devices  (Low air loss bed, waffle overlay, heel protectors, ROHO cushion, etc)  Outcome: Progressing     Problem: Fall Risk  Goal: Patient will remain free from falls  Description: Target End Date:  Prior to discharge or change in level of care    Document interventions on the Isabella Johnson Fall Risk Assessment    1.  Assess for fall risk factors  2.  Implement fall precautions  Outcome: Progressing

## 2023-10-12 NOTE — DISCHARGE PLANNING
Case Management Discharge Planning    Admission Date: 10/11/2023  GMLOS:    ALOS: 0    6-Clicks ADL Score: 14  6-Clicks Mobility Score: 10  PT, ST and/or OT Eval ordered: Yes  Post-acute Referrals Ordered: Yes  Post-acute Choice Obtained: Yes  Has referral(s) been sent to post-acute provider:  Yes      Anticipated Discharge Dispo: Discharge Disposition: D/T to home under HHA care in anticipation of covered skilled care (06)    DME Needed: Pending    Action(s) Taken: Updated Provider/Nurse on Discharge Plan, DC Assessment Complete (See below), Choice obtained, and Referral(s) sent    RN CM met with Pt at bedside to discuss Home Health service. Pt gave a verbal consent for Home Health. Choice form obtained and fax to [facility].    Escalations Completed: None    Medically Clear: No    Next Steps: CM will continue to assist Pt with discharge needs.    Barriers to Discharge: Medical clearance    Is the patient up for discharge tomorrow: No      Care Transition Team Assessment  RN PORTILLO met with Pt at bedside to obtain assessment informations. Demographics verified. Pt lives in a group home with a ramp to main entrance. Pt uses electric wheelchair.      Information Source  Orientation Level: Oriented X4  Information Given By: Patient  Who is responsible for making decisions for patient? : Other (Sibling)  Name(s) of Primary Decision Maker: Joann Silver    Readmission Evaluation  Is this a readmission?: No    Elopement Risk  Legal Hold: No  Ambulatory or Self Mobile in Wheelchair: No-Not an Elopement Risk    Interdisciplinary Discharge Planning  Lives with - Patient's Self Care Capacity: Attendant / Paid Care Giver  Patient or legal guardian wants to designate a caregiver: No  Support Systems: Home Care Staff, Other (Comments) (Pt lives in a group home with a 24/7 supervision)  Housing / Facility: [facility]  Do You Take your Prescribed Medications Regularly: Yes  Mobility Issues: Yes  Prior Services: Intermittent Physical Support  for ADL Per Service  Assistance Needed: Yes  Durable Medical Equipment: Other - Specify (electric wheelchair)    Discharge Preparedness  What is your plan after discharge?: jail  What are your discharge supports?: Other (comment) (Pt lives in a group home with a 24/7 supervision)  Prior Functional Level: Uses Wheelchair  Difficulity with ADLs: Walking  Difficulity with IADLs: Cooking, Laundry, Driving    Functional Assesment  Prior Functional Level: Uses Wheelchair    Finances  Financial Barriers to Discharge: No  Prescription Coverage: Yes    Vision / Hearing Impairment  Vision Impairment : No  Hearing Impairment : No         Advance Directive  Advance Directive?: POLST    Domestic Abuse  Have you ever been the victim of abuse or violence?: No  Physical Abuse or Sexual Abuse: No  Verbal Abuse or Emotional Abuse: No  Possible Abuse/Neglect Reported to:: Not Applicable    Psychological Assessment  History of Substance Abuse: None  History of Psychiatric Problems: No  Non-compliant with Treatment: No  Newly Diagnosed Illness: Yes    Discharge Risks or Barriers  Discharge risks or barriers?: Complex medical needs  Patient risk factors: Vulnerable adult, Cognitive / sensory / physical deficit, Complex medical needs    Anticipated Discharge Information  Discharge Disposition: D/T to home under A care in anticipation of covered skilled care (06)

## 2023-10-12 NOTE — CARE PLAN
The patient is Watcher - Medium risk of patient condition declining or worsening         Progress made toward(s) clinical / shift goals:      Problem: Neuro Status  Goal: Neuro status will remain stable or improve  Outcome: Progressing  Flowsheets (Taken 10/12/2023 0124)  Level of Consciousness: Agitated  Note: Patient is A&Ox3, disoriented to situation. Assessed speech, facial symmetry, muscle strength, and sensation.      Problem: Urinary Elimination  Goal: Establish and maintain regular urinary output  Outcome: Progressing  Note: Disposed of patient's saturated disposable brief. Provided patient with a urinal.        Patient is not progressing towards the following goals:      Problem: Knowledge Deficit - Stroke Education  Goal: Patient's knowledge of stroke and risk factors will improve  Outcome: Not Progressing  Note: Patient not responding currently to education prompts at this time.

## 2023-10-13 ENCOUNTER — APPOINTMENT (OUTPATIENT)
Dept: RADIOLOGY | Facility: MEDICAL CENTER | Age: 68
End: 2023-10-13
Attending: INTERNAL MEDICINE
Payer: COMMERCIAL

## 2023-10-13 ENCOUNTER — PATIENT OUTREACH (OUTPATIENT)
Dept: SCHEDULING | Facility: IMAGING CENTER | Age: 68
End: 2023-10-13
Payer: COMMERCIAL

## 2023-10-13 LAB
ANION GAP SERPL CALC-SCNC: 11 MMOL/L (ref 7–16)
BASOPHILS # BLD AUTO: 0.5 % (ref 0–1.8)
BASOPHILS # BLD: 0.04 K/UL (ref 0–0.12)
BUN SERPL-MCNC: 49 MG/DL (ref 8–22)
CALCIUM SERPL-MCNC: 9.2 MG/DL (ref 8.5–10.5)
CHLORIDE SERPL-SCNC: 107 MMOL/L (ref 96–112)
CO2 SERPL-SCNC: 17 MMOL/L (ref 20–33)
CREAT SERPL-MCNC: 3.58 MG/DL (ref 0.5–1.4)
EOSINOPHIL # BLD AUTO: 0.24 K/UL (ref 0–0.51)
EOSINOPHIL NFR BLD: 3.3 % (ref 0–6.9)
ERYTHROCYTE [DISTWIDTH] IN BLOOD BY AUTOMATED COUNT: 47.5 FL (ref 35.9–50)
GFR SERPLBLD CREATININE-BSD FMLA CKD-EPI: 18 ML/MIN/1.73 M 2
GLUCOSE BLD STRIP.AUTO-MCNC: 101 MG/DL (ref 65–99)
GLUCOSE SERPL-MCNC: 147 MG/DL (ref 65–99)
HCT VFR BLD AUTO: 32.7 % (ref 42–52)
HGB BLD-MCNC: 10.4 G/DL (ref 14–18)
IMM GRANULOCYTES # BLD AUTO: 0.02 K/UL (ref 0–0.11)
IMM GRANULOCYTES NFR BLD AUTO: 0.3 % (ref 0–0.9)
LYMPHOCYTES # BLD AUTO: 1.63 K/UL (ref 1–4.8)
LYMPHOCYTES NFR BLD: 22.4 % (ref 22–41)
MAGNESIUM SERPL-MCNC: 2 MG/DL (ref 1.5–2.5)
MCH RBC QN AUTO: 28 PG (ref 27–33)
MCHC RBC AUTO-ENTMCNC: 31.8 G/DL (ref 32.3–36.5)
MCV RBC AUTO: 88.1 FL (ref 81.4–97.8)
MONOCYTES # BLD AUTO: 0.63 K/UL (ref 0–0.85)
MONOCYTES NFR BLD AUTO: 8.6 % (ref 0–13.4)
NEUTROPHILS # BLD AUTO: 4.73 K/UL (ref 1.82–7.42)
NEUTROPHILS NFR BLD: 64.9 % (ref 44–72)
NRBC # BLD AUTO: 0 K/UL
NRBC BLD-RTO: 0 /100 WBC (ref 0–0.2)
PLATELET # BLD AUTO: 152 K/UL (ref 164–446)
PMV BLD AUTO: 11.1 FL (ref 9–12.9)
POTASSIUM SERPL-SCNC: 4.6 MMOL/L (ref 3.6–5.5)
RBC # BLD AUTO: 3.71 M/UL (ref 4.7–6.1)
SODIUM SERPL-SCNC: 135 MMOL/L (ref 135–145)
WBC # BLD AUTO: 7.3 K/UL (ref 4.8–10.8)

## 2023-10-13 PROCEDURE — 700102 HCHG RX REV CODE 250 W/ 637 OVERRIDE(OP): Performed by: NURSE PRACTITIONER

## 2023-10-13 PROCEDURE — 83735 ASSAY OF MAGNESIUM: CPT

## 2023-10-13 PROCEDURE — G0378 HOSPITAL OBSERVATION PER HR: HCPCS

## 2023-10-13 PROCEDURE — A9270 NON-COVERED ITEM OR SERVICE: HCPCS | Performed by: NURSE PRACTITIONER

## 2023-10-13 PROCEDURE — 82962 GLUCOSE BLOOD TEST: CPT

## 2023-10-13 PROCEDURE — 85025 COMPLETE CBC W/AUTO DIFF WBC: CPT

## 2023-10-13 PROCEDURE — A9270 NON-COVERED ITEM OR SERVICE: HCPCS | Performed by: INTERNAL MEDICINE

## 2023-10-13 PROCEDURE — 76775 US EXAM ABDO BACK WALL LIM: CPT

## 2023-10-13 PROCEDURE — 80048 BASIC METABOLIC PNL TOTAL CA: CPT

## 2023-10-13 PROCEDURE — 36415 COLL VENOUS BLD VENIPUNCTURE: CPT

## 2023-10-13 PROCEDURE — 99233 SBSQ HOSP IP/OBS HIGH 50: CPT | Performed by: INTERNAL MEDICINE

## 2023-10-13 PROCEDURE — 700102 HCHG RX REV CODE 250 W/ 637 OVERRIDE(OP): Performed by: INTERNAL MEDICINE

## 2023-10-13 RX ADMIN — CALCITRIOL CAPSULES 0.25 MCG 0.25 MCG: 0.25 CAPSULE ORAL at 05:50

## 2023-10-13 RX ADMIN — TAMSULOSIN HYDROCHLORIDE 0.8 MG: 0.4 CAPSULE ORAL at 20:54

## 2023-10-13 RX ADMIN — FINASTERIDE 5 MG: 5 TABLET, FILM COATED ORAL at 05:49

## 2023-10-13 RX ADMIN — ACETAMINOPHEN 325 MG: 325 TABLET, FILM COATED ORAL at 16:43

## 2023-10-13 RX ADMIN — APIXABAN 2.5 MG: 5 TABLET, FILM COATED ORAL at 05:50

## 2023-10-13 RX ADMIN — ATORVASTATIN CALCIUM 80 MG: 80 TABLET, FILM COATED ORAL at 20:54

## 2023-10-13 RX ADMIN — ACETAMINOPHEN 325 MG: 325 TABLET, FILM COATED ORAL at 05:50

## 2023-10-13 RX ADMIN — APIXABAN 2.5 MG: 5 TABLET, FILM COATED ORAL at 16:42

## 2023-10-13 ASSESSMENT — ENCOUNTER SYMPTOMS
DIARRHEA: 0
DOUBLE VISION: 0
DIZZINESS: 0
FEVER: 0
WEIGHT LOSS: 0
ORTHOPNEA: 0
MYALGIAS: 0
FOCAL WEAKNESS: 1
CHILLS: 0
SPEECH CHANGE: 1
NECK PAIN: 0
PHOTOPHOBIA: 0
HEMOPTYSIS: 0
BLURRED VISION: 0
PALPITATIONS: 0
CONSTIPATION: 0
WEAKNESS: 1
ABDOMINAL PAIN: 0
COUGH: 0
VOMITING: 0
NAUSEA: 0
CLAUDICATION: 0

## 2023-10-13 ASSESSMENT — PAIN DESCRIPTION - PAIN TYPE
TYPE: ACUTE PAIN

## 2023-10-13 ASSESSMENT — FIBROSIS 4 INDEX: FIB4 SCORE: 2.87

## 2023-10-13 NOTE — CARE PLAN
The patient is Stable - Low risk of patient condition declining or worsening    Shift Goals  Clinical Goals: NIHS, Q4 Neuro  Patient Goals: Rest  Family Goals: NEELAM    Progress made toward(s) clinical / shift goals:        Problem: Knowledge Deficit - Stroke Education  Goal: Patient's knowledge of stroke and risk factors will improve  Outcome: Progressing     Problem: Neuro Status  Goal: Neuro status will remain stable or improve  Outcome: Progressing

## 2023-10-13 NOTE — DISCHARGE PLANNING
Titus Gilmore assistant. Took a report and will be following up with supervisor Mando and call us back. She will call John NATH supervisor at [phone].    [facility] will not take back per Kateryna [phone]. Attempted to get [place] group home. Declined. May from [facility] trying to help us facilitate a group home. Probable d/c on Monday.

## 2023-10-13 NOTE — DISCHARGE PLANNING
Care Transition Team Discharge Planning    Anticipated Discharge Information  Discharge Disposition: D/T to home under HHA care in anticipation of covered skilled care (06)              Discharge Plan:  Pt recommended HH. [facility] Spoke to the patient and he agreed. Called the group home Kateryna at [phone] and she is refusing to take him back. She states she can no longer care from him and he needs a snf. I explained the d/c plan and recommendations from Physical therapy and she still insisted he could not come back. Stating she and her caregiver can not lift him or do everything for him anymore. She had no phone number for sister in [place]. I called  patient's fiduciary and Therese [phone] stated she only pays the bills-she makes no decisions and has no contact with sister. She states he has the medicare waiver that subsidizes his $2000 a month for the group home. I call Leidy the [facility] for patient. She was able to give the number  for sister Naheed in [place] [phone] stating that she is no help because she has no interest in him and they never speak. Escalated to leadership.

## 2023-10-13 NOTE — DISCHARGE PLANNING
Case Management Discharge Planning  [facility] [phone]. Dr Priscilla Richard approved HH with [employer]. They will provide their own speech therapy but will allow PT/OT services.       Admission Date: 10/11/2023  GMLOS:    ALOS: 0    6-Clicks ADL Score: 12  6-Clicks Mobility Score: 7  PT and/or OT Eval ordered: Yes  HH referral with [facility].  Post-acute Referrals Ordered: Yes  Post-acute Choice Obtained: Yes  Has referral(s) been sent to post-acute provider:  Yes      Anticipated Discharge Dispo: Discharge Disposition: D/T to home under A care in anticipation of covered skilled care (06)    DME Needed: No    Action(s) Taken: DC Assessment Complete (See below), Choice obtained, Referral(s) sent, Acceptance Received, and Transport Arranged     Escalations Completed: Ride Line    Medically Clear: Yes    Next Steps: set up transfer to [facility].     Barriers to Discharge: Transportation    Is the patient up for discharge tomorrow: No  D/c planned for today.

## 2023-10-13 NOTE — CARE PLAN
The patient is Stable - Low risk of patient condition declining or worsening    Shift Goals  Clinical Goals: Monitor Neuro status  Patient Goals: Discharge  Family Goals: NEELAM    Progress made toward(s) clinical / shift goals:      Problem: Optimal Care of the Stroke Patient  Goal: Optimal emergency care for the stroke patient  Description: Target End Date:  End of day 1    Time of Onset    1.  Time of last known well obtained  2.  Patient and family/caregiver verbalize understanding of diagnosis, medications and testing  3.  NIHSS performed and documented, including date and time, for ischemic stroke patients prior to any acute recanalization therapy (thrombolytics or mechanical) or within 12 hours of arrival if no intervention is warranted  4.  Consults and referrals placed to appropriate departments    Medications Administration as Ordered:    1.  Implement appropriate reversal agents for INR greater than 1.5  2.  Pre-alteplase administration of antihypertensives for SBP >185 DBP >110  3.  Post-alteplase administration of antihypertensives for SBP >185, DBP >105  4.  Thrombolytic Therapy for qualifying ischemic stroke patients who arrive within 4.5 hours of time of Last Known Well. Thrombolytic therapy administered within 30 minutes or a documented reason for delay  Outcome: Progressing  Goal: Optimal acute care for the stroke patient  Description: Target End Date:  1 to 3 days    - Vital signs and neuro checks performed and documented per order  - NIHSS completed and documented per order  - Continuous telemetry monitoring for 72 hours or until discontinued by provider  - Head CT without contrast obtained  - Consideration of MRI/MRA  - MRI screening form completed in worklist if MRI ordered  - Echocardiogram with Bubble Study ordered/completed with consideration of KUSUM  - Carotid Doppler ordered/completed (Not required if CTA of neck completed in ED)  - Lipid Panel obtained within 48 hours of admission  - PT, PTT,  INR obtained per Anticoagulation orders (if applicable)  - Antithrombotic therapy by end of hospital day 2 for ischemic stroke. Provider must document reason if contraindicated.  - Venous Thromboembolism (VTE) Prophylaxis by end of hospital day 2 for ischemic and hemorrhagic stroke. Provider must document reason if contraindicated  - Dysphagia screen completed and documented prior to any PO intake. Patient to remain NPO until Speech Therapy evaluation if thrombolytic or thrombectomy performed  - Rehabilitation assessment including PT/OT/SLP evaluations for referral to Physical Medicine and Rehabilitation services. If none needed, provider needs to document reason  - Neurology consult placed  - Consideration of cardiology consult for cryptogenic strokes  Outcome: Progressing     Problem: Knowledge Deficit - Stroke Education  Goal: Patient's knowledge of stroke and risk factors will improve  Description: Target End Date:  1-3 days or as soon as patient condition allows    Document in Patient Education    1.  Stroke education booklet provided  2.  Education regarding EMS activation, need for follow up, medication prescribed at discharge, risk factors for stroke/lifestyle modifications, warning signs and symptoms of stroke provided  Outcome: Progressing     Problem: Psychosocial - Patient Condition  Goal: Patient's ability to verbalize feelings about condition will improve  Description: Target End Date:  Prior to discharge or change in level of care    1.  Discuss coping with medical condition and its effects  2.  Encourage patient participation in care  3.  Encourage acknowledgement of body changes and accompanying emotions  4.  Perform depression screening  Outcome: Progressing  Goal: Patient's ability to re-evaluate and adapt role responsibilities will improve  Description: Target End Date:  Prior to discharge or change in level of care    1.  Assess family support  2.  Encourage support system participation in  care  3.  Encouraged verbalization of feelings regarding caregiver responsibilities  4.  Discuss changes in role and responsibilities caused by patient's condition  Outcome: Progressing     Problem: Discharge Planning - Stroke  Goal: Ensure Stroke Core Measures are met prior to discharge  Description: Target End Date:  Prior to discharge or change in level of care    1. Patient discharged on antithrombotic therapy (Ischemic Stroke)  2. Patient discharged on intensive statin if LDL is greater than or equal to 70 mg/dl (Ischemic Stroke)  3. Patient discharged on anticoagulation therapy for patients with atrial fibrillation/flutter (Ischemic Stroke)  4. Smoking education/cessation provided if applicable  Outcome: Progressing  Goal: Patient’s continuum of care needs will be met  Description: Target End Date:  Prior to discharge or change in level of care    1.  Potential discharge barriers identified upon admission and throughout hospital stay  2.  Ensure appropriate referrals in place for follow up with specialists after discharge  3.  Ensure appropriate referrals in place for DMEs if applicable  4.  Collaboration with transitional care team and interdisciplinary team to meet discharge needs  5.  Involve patient and family/caregiver in prioritizing goals for discharge planning  6.  Educate patient and caregiver about discharge instructions, medications, and follow up appointments  7.  Referral placed to [facility]  8.  Assure orders and follow up appointments are made for outpatient extended cardiac monitoring if appropriate  Outcome: Progressing     Problem: Neuro Status  Goal: Neuro status will remain stable or improve  Description: Target End Date:  Prior to discharge or change in level of care    Document on Neuro assessment in the Assessment flowsheet    1.  Assess and monitor neurologic status per provider order/protocol/unit policy  2.  Assess level of consciousness and orientation  3.  Assess for  speech, dysarthria, dysphagia, facial symmetry  4.  Assess visual field, eye movements, gaze preference, pupil reaction and size  5.  Assess muscle strength and motor response in all four extremities  6.  Assess for sensation (numbness and tingling)  7.  Assess basic neuro reflexes (cough, gag, corneal)  8.  Identify changes in neuro status and report to provider for testing/treatment orders  Outcome: Progressing     Problem: Hemodynamic Monitoring  Goal: Patient's hemodynamics, fluid balance and neurologic status will be stable or improve  Description: Target End Date:  Prior to discharge or change in level of care    1.  Vital signs, pulse oximetry and cardiac monitor per provider order and/or policy  2.  Frequent pulse checks performed post thrombectomy  3.  Frequent monitoring for signs of bleeding post TPA administration  4.  Proper management of IV infusions  5.  Intake and output monitored per provider order  6.  Daily weight obtained per unit policy or provider order  7.  Peripheral pulses and capillary refill assessed as needed  8.  Monitor for signs/symptoms of excessive bleeding  9.  Body temperature assessed and fevers treated  10. Patient positioned for maximum circulation/cardiac output  Outcome: Progressing     Problem: Respiratory - Stroke Patient  Goal: Patient will achieve/maintain optimum respiratory rate/effort  Description: Target End Date:  Prior to discharge or change in level of care    Document on Assessment flowsheet    1.  Assess and monitor respiratory rate, rhythm, depth and effort of respiration  2.  Oxygenation assessed throughout shift (recommendation of >94% for new stroke patients)  3.  Oxygen administered and/or titrated per order  4.  Collaboration with RT to administer medication/treatments per order  5.  Patient educated on importance of turning, coughing, and deep breathing  6.  Patient positioned for maximum ventilatory efficiency  7.  Airway suctioning provided as needed  8.   Incentive spirometry encouraged 5-10 times every hour or while awake  Outcome: Progressing     Problem: Dysphagia  Goal: Dysphagia will improve  Description: Target End Date:  Prior to discharge or change in level of care    1.  Assess and monitor ability to swallow  2.  Collaborate with Speech Therapy to determine appropriate adaptation for safe administration of medications and oral nutrition  3.  Elevate head of bed to 90 degrees during feedings and for 30 minutes after each feeding  4.  Encourage proper swallowing techniques  5.  Screening on admission or as soon as possible  Outcome: Progressing     Problem: Risk for Aspiration  Goal: Patient's risk for aspiration will be absent or decrease  Description: Target End Date:  Prior to discharge or change in level of care    1.   Complete dysphagia screening on admission  2.   NPO until dysphagia screening complete or medically cleared  3.   Collaborate with Speech Therapy, Clinical Dietitian and interdisciplinary team  4.   Implement aspiration precautions  5.   Assist patient up to chair for meals  6.   Elevate head of bed 90 degrees if patient is unable to get out of bed  7.   Encourage small bites  8.   Ensure foods/liquids are of appropriate consistency  9.   Assess for any signs/symptoms of aspiration  10. Assess breath sounds and vital signs after oral intake  Outcome: Progressing     Problem: Urinary Elimination  Goal: Establish and maintain regular urinary output  Description: Target End Date:  Prior to discharge or change in level of care    Document on I/O and Assessment flowsheets    1.  Evaluate need to continue indwelling catheter every shift  2.  Assess signs and symptoms of urinary retention  3.  Assess post-void residual volumes  4.  Implement bladder training program  5.  Encourage scheduled voidings  6.  Assist patient to sit on bedside commode or toilet for voiding  7.  Educate patient and family/caregiver on use and purpose of urine collection  devices (document in Patient Education)  Outcome: Progressing     Problem: Bowel Elimination  Goal: Establish and maintain regular bowel function  Description: Target End Date:  Prior to discharge or change in level of care    1.   Note date of last BM  2.   Educate about diet, fluid intake, medication and activity to promote bowel function  3.   Educate signs and symptoms of constipation and interventions to implement  4.   Pharmacologic bowel management per provider order  5.   Regular toileting schedule  6.   Upright position for toileting  7.   High fiber diet  8.   Encourage hydration  9.   Collaborate with Clinical Dietician  10. Care and maintenance of ostomy if applicable  Outcome: Progressing     Problem: Mobility - Stroke  Goal: Patient's capacity to carry out activities will improve  Description: Target End Date:  Prior to discharge or change in level of care    1.  Assess for barriers to mobility/activity  2.  Implement activity per interdisciplinary team recommendations  3.  Target activity level identified and patient/family/caregiver aware of goal  4.  Provide assistive devices  5.  Instruct patient/caregiver on proper use of assistive/adaptive devices  6.  Schedule activities and rest periods to decrease effects of fatigue  7.  Encourage mobilization to extent of ability  8.  Maintain proper body alignment  9.  Provide adequate pain management to allow progressive mobilization  10. Implement pace maker precautions as needed  Outcome: Progressing  Goal: Spasticity will be prevented or improved  Description: Target End Date:  Prior to discharge or change in level of care    1.  Muscle relaxing agents considered or administered per order  2.  Splints applied properly and used accordingly to therapist's recommendations  3.  Assistance with stretching and range of motion exercises provided  Outcome: Progressing  Goal: Subluxation will be prevented or improved  Description: Target End Date:  Prior to  discharge or change in level of care    1.   Ensure proper handling during transfers, ambulation, and repositioning in bed  2.   Reduce traction to affected limb and provide adequate support of weight  3.   Perform passive range of motion  4.  Assist with active range of motion  Outcome: Progressing     Problem: Self Care  Goal: Patient will have the ability to perform ADLs independently or with assistance (bathe, groom, dress, toilet and feed)  Description: Target End Date:  Prior to discharge or change in level of care    Document on ADL flowsheet    1.  Assess the capability and level of deficiency to perform ADLs  2.  Encourage family/care giver involvement  3.  Provide assistive devices  4.  Consider PT/OT evaluations  5.  Maintain support, give positive feedback, encourage self-care allowing extra time and verbal cuing as needed  6.  Avoid doing something for patients they can do themselves, but provide assistance as needed  7.  Assist in anticipating/planning individual needs  8.  Collaborate with Case Management and  to meet discharge needs  Outcome: Progressing     Problem: Knowledge Deficit - Standard  Goal: Patient and family/care givers will demonstrate understanding of plan of care, disease process/condition, diagnostic tests and medications  Description: Target End Date:  1-3 days or as soon as patient condition allows    Document in Patient Education    1.  Patient and family/caregiver oriented to unit, equipment, visitation policy and means for communicating concern  2.  Complete/review Learning Assessment  3.  Assess knowledge level of disease process/condition, treatment plan, diagnostic tests and medications  4.  Explain disease process/condition, treatment plan, diagnostic tests and medications  Outcome: Progressing     Problem: Skin Integrity  Goal: Skin integrity is maintained or improved  Description: Target End Date:  Prior to discharge or change in level of care    Document  interventions on Skin Risk/Wilian flowsheet groups and corresponding LDA    1.  Assess and monitor skin integrity, appearance and/or temperature  2.  Assess risk factors for impaired skin integrity and/or pressures ulcers  3.  Implement precautions to protect skin integrity in collaboration with interdisciplinary team  4.  Implement pressure ulcer prevention protocol if at risk for skin breakdown  5.  Confirm wound care consult if at risk for skin breakdown  6.  Ensure patient use of pressure relieving devices  (Low air loss bed, waffle overlay, heel protectors, ROHO cushion, etc)  Outcome: Progressing     Problem: Fall Risk  Goal: Patient will remain free from falls  Description: Target End Date:  Prior to discharge or change in level of care    Document interventions on the Isabella Johnson Fall Risk Assessment    1.  Assess for fall risk factors  2.  Implement fall precautions  Outcome: Progressing

## 2023-10-13 NOTE — DISCHARGE PLANNING
RN CM received a call from Titus with [facility].  Titus explained that a report will be made to the [facility] regarding [facility]'s refusal to take the patient back.  [facility] will follow up with the patient within 7 days.

## 2023-10-13 NOTE — PROGRESS NOTES
Assumed care of patient after receiving report from Celia night shift RN. Patient is currently Alert and Oriented x4 on Room Air. Bed locked and in lowest position. Call light within reach.

## 2023-10-13 NOTE — DISCHARGE PLANNING
Agency/Facility Name: [facility]  Spoke To: Sofi  Outcome: DPA informed  that Pt's group home is no longer able to take Pt back, per RN CM's update.  [facility] still [facility].     RN CM notified.

## 2023-10-13 NOTE — PROGRESS NOTES
[facility] Medicine Daily Progress Note    Date of Service  10/13/2023    Chief Complaint  Brannon Silver is a 68 y.o. male admitted 10/11/2023 with right-sided weakness    Hospital Course  68-year-old male with complicated history of diabetes, chronic kidney disease, brain cancer? chronic kidney disease, coronary artery disease, hypertension and prostatic cardiac valve on Eliquis with a AAA who presented 10/11 with possible stroke.  Patient lives in a group home, patient not able to provide good history due to cognitive disorder.  Patient has history of for brain cancer and treated with a craniotomy.  Patient complained of right-sided weakness with slurred speech and worsening confusion.  On admission patient right-sided weakness  some with facial droop.  CT scan of her head did not show any acute finding or bleeding, CTA head and neck did not show any stenosis, EKG showed sinus rhythm with no arrhythmia.  Troponin was mildly elevated at 35.  Neurology evaluated the patient and recommended MRI which was negative for any new mass or any infarction, patient around his baseline and working to transfer the patient to his group home.      Interval Problem Update  -Evaluated and examined the patient at bedside, patient is alert oriented x3 no changes on his neuro exam  -MRI was reviewed with the patient, no acute stroke  -Patient is on room air, blood pressure is stable  -Continue Eliquis and waiting for MRI result  -Patient has POLST and he wants to be full code  -Ultrasound did not show any acute finding, chronic kidney disease.  -PT and OT and patient needs home health, patient lives in a group home who refused to take the patient back,  was consulted for discharge      I have discussed this patient's plan of care and discharge plan at IDT rounds today with Case Management, Nursing, Nursing leadership, and other members of the IDT team.    Consultants/Specialty  neurology    Code Status  Full  Code    Disposition  The patient is not medically cleared for discharge to home or a post-acute facility.  Anticipate discharge to: home with organized home healthcare and close outpatient follow-up    I have placed the appropriate orders for post-discharge needs.    Review of Systems  Review of Systems   Constitutional:  Negative for chills, fever and weight loss.   HENT:  Negative for ear pain, hearing loss and tinnitus.    Eyes:  Negative for blurred vision, double vision and photophobia.   Respiratory:  Negative for cough and hemoptysis.    Cardiovascular:  Negative for chest pain, palpitations, orthopnea and claudication.   Gastrointestinal:  Negative for abdominal pain, constipation, diarrhea, nausea and vomiting.   Genitourinary:  Negative for dysuria, frequency and urgency.   Musculoskeletal:  Negative for myalgias and neck pain.   Skin:  Negative for rash.   Neurological:  Positive for speech change, focal weakness and weakness. Negative for dizziness.        Physical Exam  Temp:  [36.5 °C (97.7 °F)-37.2 °C (99 °F)] 36.5 °C (97.7 °F)  Pulse:  [] 110  Resp:  [15-18] 18  BP: ()/(59-87) 132/80  SpO2:  [89 %-97 %] 97 %    Physical Exam  Constitutional:       Appearance: He is not ill-appearing.      Comments: Patient is not very cooperative with exam   Eyes:      General: No scleral icterus.  Cardiovascular:      Rate and Rhythm: Normal rate.      Heart sounds: No murmur heard.  Pulmonary:      Effort: No respiratory distress.      Breath sounds: No wheezing.   Abdominal:      General: Bowel sounds are normal. There is no distension.      Palpations: Abdomen is soft.      Tenderness: There is no abdominal tenderness. There is no right CVA tenderness, left CVA tenderness or guarding.   Musculoskeletal:      Right lower leg: No edema.      Left lower leg: No edema.   Lymphadenopathy:      Cervical: No cervical adenopathy.   Skin:     Coloration: Skin is not jaundiced.      Findings: No bruising,  lesion or rash.   Neurological:      General: No focal deficit present.      Mental Status: He is alert and oriented to person, place, and time. Mental status is at baseline.      Cranial Nerves: No cranial nerve deficit.      Motor: Weakness present.      Gait: Gait abnormal.         Fluids    Intake/Output Summary (Last 24 hours) at 10/13/2023 1425  Last data filed at 10/13/2023 1147  Gross per 24 hour   Intake 440 ml   Output --   Net 440 ml       Laboratory  Recent Labs     10/11/23  1604 10/13/23  0157   WBC 6.1 7.3   RBC 4.93 3.71*   HEMOGLOBIN 14.0 10.4*   HEMATOCRIT 42.8 32.7*   MCV 86.8 88.1   MCH 28.4 28.0   MCHC 32.7 31.8*   RDW 47.3 47.5   PLATELETCT 185 152*   MPV 11.0 11.1     Recent Labs     10/11/23  1604 10/12/23  0120 10/13/23  0157   SODIUM 141 138 135   POTASSIUM 4.1 4.7 4.6   CHLORIDE 106 105 107   CO2 22 21 17*   GLUCOSE 178* 93 147*   BUN 48* 48* 49*   CREATININE 3.05* 3.22* 3.58*   CALCIUM 10.7* 9.5 9.2     Recent Labs     10/11/23  1604   APTT 30.1   INR 1.18*         Recent Labs     10/12/23  0120   TRIGLYCERIDE 68   HDL 31*   LDL 39       Imaging  US-RENAL   Final Result      1.  Echogenic kidneys suggesting chronic renal parenchymal disease.   2.  No hydronephrosis.      MR-BRAIN-W/O   Final Result         No acute intracranial process.      Age-related volume loss and chronic microvascular ischemic changes.      Multiple remote infarcts involving the right frontal, right parieto-occipital, left occipital, bilateral inferior cerebellar regions. These are stable from the previous MRI from 2019.      Stable foci of hemosiderin deposition in the bilateral inferior cerebellum are likely related to remote hypertensive microhemorrhages.      DX-CHEST-PORTABLE (1 VIEW)   Final Result      1.  Hypoinflation with minimal bibasilar atelectasis   2.  No pneumonia or pneumothorax.   3.  Stable cardiomegaly.      CT-CEREBRAL PERFUSION ANALYSIS   Final Result      1.  Cerebral blood flow less than 30%  likely representing completed infarct = 0 mL.      2.  T Max more than 6 seconds likely representing combination of completed infarct and ischemia = 0 mL.      3.  Mismatched volume likely representing ischemic brain/penumbra = 0 mL.      Please note that the cerebral perfusion was performed on the limited brain tissue around the basal ganglia region. Infarct/ischemia outside the CT perfusion sections can be missed in this study.      CT-CTA NECK WITH & W/O-POST PROCESSING   Final Result      No focal high-grade stenosis, dissection or occlusion of the cervical carotid or vertebral arteries.      CT-CTA HEAD WITH & W/O-POST PROCESS   Final Result      No intracranial aneurysm, focal stenosis, or abrupt large vessel cut off.      CT-HEAD W/O   Final Result      1.  No evidence of acute territorial infarct, intracranial hemorrhage or mass lesion.   2.  Moderate diffuse cerebral substance loss.   3.  Advanced microangiopathic ischemic change versus demyelination or gliosis.   4.  Areas of encephalomalacia in the right frontal and right occipital lobe which may related to previous infarct or trauma.              Assessment/Plan  * Stroke-like symptom- (present on admission)  Assessment & Plan  Reported right sided weakness and facial droop. Hx multiple CVAs, noted on head CT  CT head did not show any acute finding and CTA for head and neck did not show any stenosis  Waiting for MRI  Not candidate for tPA, neurology on board   PT and OT   neurochecks every 4 hours        Cognitive disorder  Assessment & Plan  Patient is alert and oriented x3 however patient is not answering all questions, not cooperative with exam, patient might have some psychosis versus declining on cognitive disorder and memory problems, waiting for MRI.    Type 2 diabetes, diet controlled (HCC)- (present on admission)  Assessment & Plan  Last A1c 6.4 on 8/4/23. Diet controlled  Sliding scale    Elevated troponin- (present on admission)  Assessment &  Plan  Trop T mildly elevated at 35, possibly secondary to FARZAD on CKD stage 4. Patient is asymptomatic, EKG with no acute ST-T changes  Telemetry, denied any chest pain, continue Eliquis    Acute renal failure with acute tubular necrosis superimposed on chronic kidney disease (HCC)- (present on admission)  Assessment & Plan  History of CKD stage 4. Cr on 8/4/23 was 2.7 with eGFR 25. Cr 3.05 with eGFR 21 at presentation today.  Avoid nephrotoxic medications  Labs daily    H/O aortic valve replacement- (present on admission)  Assessment & Plan  Chronically on Eliquis      Brain tumor (HCC)- (present on admission)  Assessment & Plan  We will order MRI for brain    Essential hypertension- (present on admission)  Assessment & Plan  Holding lisinopril due to worsening creatinine  Continue metoprolol    CAD (coronary artery disease)- (present on admission)  Assessment & Plan  S/p stent placement  Continue home medication metoprolol and Eliquis with atorvastatin  Telemetry, denied any chest pain    Severe aortic stenosis- (present on admission)  Assessment & Plan  Aortic valve stenosis  On Eliquis         VTE prophylaxis:   SCDs/TEDs   enoxaparin ppx      I have performed a physical exam and reviewed and updated ROS and Plan today (10/13/2023). In review of yesterday's note (10/12/2023), there are no changes except as documented above.    Greater than 51 minutes spent prepping to see patient (e.g. review of tests) obtaining and/or reviewing separately obtained history. Performing a medically appropriate examination and/ evaluation.  Counseling and educating the patient/family/caregiver.  Ordering medications, tests, or procedures.  Referring and communicating with other health care professionals.  Documenting clinical information in EPIC.  Independently interpreting results and communicating results to patient/family/caregiver.  Care coordination

## 2023-10-13 NOTE — PROGRESS NOTES
Bedside report given by José Miguel MONTIEL. Assumed pt care. Pt is awake lying in bed watching TV. On RA. Denies any pain. Bed in low and locked position, call light within reach. Will continue to monitor.

## 2023-10-14 PROBLEM — D63.8 ANEMIA, CHRONIC DISEASE: Status: ACTIVE | Noted: 2019-06-25

## 2023-10-14 PROBLEM — N17.9 ACUTE KIDNEY INJURY SUPERIMPOSED ON CHRONIC KIDNEY DISEASE (HCC): Status: ACTIVE | Noted: 2023-10-11

## 2023-10-14 PROBLEM — E11.29 TYPE 2 DIABETES MELLITUS WITH KIDNEY COMPLICATION, WITHOUT LONG-TERM CURRENT USE OF INSULIN (HCC): Status: ACTIVE | Noted: 2023-10-11

## 2023-10-14 PROBLEM — R13.10 DYSPHAGIA: Status: ACTIVE | Noted: 2023-10-14

## 2023-10-14 PROBLEM — I27.20 PULMONARY HTN (HCC): Status: ACTIVE | Noted: 2023-10-14

## 2023-10-14 PROBLEM — Z86.73 HISTORY OF CVA (CEREBROVASCULAR ACCIDENT): Status: ACTIVE | Noted: 2023-10-14

## 2023-10-14 LAB
ANION GAP SERPL CALC-SCNC: 9 MMOL/L (ref 7–16)
BUN SERPL-MCNC: 46 MG/DL (ref 8–22)
CALCIUM SERPL-MCNC: 9.1 MG/DL (ref 8.5–10.5)
CHLORIDE SERPL-SCNC: 108 MMOL/L (ref 96–112)
CO2 SERPL-SCNC: 20 MMOL/L (ref 20–33)
CREAT SERPL-MCNC: 3.38 MG/DL (ref 0.5–1.4)
GFR SERPLBLD CREATININE-BSD FMLA CKD-EPI: 19 ML/MIN/1.73 M 2
GLUCOSE SERPL-MCNC: 108 MG/DL (ref 65–99)
NT-PROBNP SERPL IA-MCNC: 197 PG/ML (ref 0–125)
POTASSIUM SERPL-SCNC: 4.7 MMOL/L (ref 3.6–5.5)
PTH-INTACT SERPL-MCNC: 130 PG/ML (ref 14–72)
SODIUM SERPL-SCNC: 137 MMOL/L (ref 135–145)

## 2023-10-14 PROCEDURE — 36415 COLL VENOUS BLD VENIPUNCTURE: CPT

## 2023-10-14 PROCEDURE — 83880 ASSAY OF NATRIURETIC PEPTIDE: CPT

## 2023-10-14 PROCEDURE — 80048 BASIC METABOLIC PNL TOTAL CA: CPT

## 2023-10-14 PROCEDURE — 99233 SBSQ HOSP IP/OBS HIGH 50: CPT | Performed by: FAMILY MEDICINE

## 2023-10-14 PROCEDURE — G0378 HOSPITAL OBSERVATION PER HR: HCPCS

## 2023-10-14 PROCEDURE — A9270 NON-COVERED ITEM OR SERVICE: HCPCS | Performed by: NURSE PRACTITIONER

## 2023-10-14 PROCEDURE — 83970 ASSAY OF PARATHORMONE: CPT

## 2023-10-14 PROCEDURE — 700102 HCHG RX REV CODE 250 W/ 637 OVERRIDE(OP): Performed by: NURSE PRACTITIONER

## 2023-10-14 RX ORDER — METOPROLOL SUCCINATE 25 MG/1
25 TABLET, EXTENDED RELEASE ORAL EVERY MORNING
Status: DISCONTINUED | OUTPATIENT
Start: 2023-10-15 | End: 2023-10-18 | Stop reason: HOSPADM

## 2023-10-14 RX ADMIN — TAMSULOSIN HYDROCHLORIDE 0.8 MG: 0.4 CAPSULE ORAL at 20:53

## 2023-10-14 RX ADMIN — ATORVASTATIN CALCIUM 80 MG: 80 TABLET, FILM COATED ORAL at 20:53

## 2023-10-14 ASSESSMENT — ENCOUNTER SYMPTOMS
DIAPHORESIS: 0
VOMITING: 0
FEVER: 0
ABDOMINAL PAIN: 0
DIZZINESS: 0
HEADACHES: 0
CHILLS: 0
COUGH: 0
FOCAL WEAKNESS: 1
SHORTNESS OF BREATH: 0
SPEECH CHANGE: 0
SORE THROAT: 0
WEAKNESS: 1
MYALGIAS: 0
FLANK PAIN: 0
NAUSEA: 0
HEARTBURN: 0
BACK PAIN: 0
BLURRED VISION: 0
PALPITATIONS: 0
WHEEZING: 0
DIARRHEA: 0
NERVOUS/ANXIOUS: 1
SENSORY CHANGE: 0
NECK PAIN: 0

## 2023-10-14 ASSESSMENT — PAIN DESCRIPTION - PAIN TYPE
TYPE: ACUTE PAIN

## 2023-10-14 NOTE — PROGRESS NOTES
Assumed care of patient at 1900. Received report from day RN. Patient A&Ox4, sitting up in bed, on RA, Reporting a pain level of 4/10 in knee but declines interventions at this time, will continue to reassess and monitor pain throughout shift. Call light within reach, belongings within reach, Fall precautions in place, bed in lowest position, bed alarm on. Provided soda per pt request. Patient does not have any other needs at this time.     POC was discussed with patient. All questions were answered. Patient verbalized understanding.

## 2023-10-14 NOTE — PROGRESS NOTES
4 Eyes Skin Assessment Completed by TIANA Peña and TIANA Hallman.    Head Scab and Scar  Ears Discoloration, flaky  Nose WDL  Mouth WDL  Neck WDL  Breast/Chest WDL  Shoulder Blades WDL  Spine WDL  (R) Arm/Elbow/Hand WDL  (L) Arm/Elbow/Hand WDL  Abdomen WDL  Groin WDL  Scrotum/Coccyx/Buttocks Blanching and Discoloration  (R) Leg Scar and Abrasion on knee, flaky  (L) Leg Scar, flaky  (R) Heel/Foot/Toe Redness and Blanching, Flaky, Callous  (L) Heel/Foot/Toe Redness and Blanching, Callous          Devices In Places Blood Pressure Cuff and Condom Cath      Interventions In Place Waffle Overlay, TAP System, Pillows, Q2 Turns, and Pressure Redistribution Mattress    Possible Skin Injury No    Pictures Uploaded Into Epic N/A  Wound Consult Placed N/A  RN Wound Prevention Protocol Ordered No

## 2023-10-14 NOTE — CARE PLAN
The patient is Stable - Low risk of patient condition declining or worsening    Shift Goals  Clinical Goals: Monitor neuro status  Patient Goals: Sleep  Family Goals: NEELAM    Progress made toward(s) clinical / shift goals:  Pt has d2qqire assessments being performed. No changes to neuro status during shift. Pt A&Ox4. NIHSS ordered q12h. Pt provided with ear plugs and repositioned in bed to help with sleep.      Problem: Neuro Status  Goal: Neuro status will remain stable or improve  Outcome: Progressing     Problem: Knowledge Deficit - Standard  Goal: Patient and family/care givers will demonstrate understanding of plan of care, disease process/condition, diagnostic tests and medications  Outcome: Progressing     Problem: Skin Integrity  Goal: Skin integrity is maintained or improved  Outcome: Progressing     Problem: Fall Risk  Goal: Patient will remain free from falls  Outcome: Progressing       Patient is not progressing towards the following goals:

## 2023-10-14 NOTE — PROGRESS NOTES
Hospital Medicine Daily Progress Note    Date of Service  10/14/2023    Chief Complaint  Brannon Silver is a 68 y.o. male admitted 10/11/2023 with stroke-like symptoms    Hospital Course  Admitted with strokelike symptoms of right-sided weakness, aphasia, and increased confusion.  Patient with known history of CVA, AVR, was already on anticoagulation with Eliquis.  Neurology was consulted on the case, he was not a candidate for tPA.  MRI did not show any acute findings.  He was also noted to have FARZAD on CKD stage IV.  He has known history of cardiomyopathy, his Bumex, lisinopril, and eplerenone were all held.    Interval Problem Update  HTN - sbp   Cardiomyopathy - probnp 197  Diabetes -   FARZAD - crea 3.3    I have discussed this patient's plan of care and discharge plan at IDT rounds today with Case Management, Nursing, Nursing leadership, and other members of the IDT team.    Consultants/Specialty  neurology    Code Status  Full Code    Disposition  The patient is medically cleared for discharge to home or a post-acute facility.  Anticipate discharge to: skilled nursing facility    I have placed the appropriate orders for post-discharge needs.    Review of Systems  Review of Systems   Constitutional:  Positive for malaise/fatigue. Negative for chills, diaphoresis and fever.   HENT:  Negative for congestion, hearing loss and sore throat.    Eyes:  Negative for blurred vision.   Respiratory:  Negative for cough, shortness of breath and wheezing.    Cardiovascular:  Positive for leg swelling. Negative for chest pain and palpitations.   Gastrointestinal:  Negative for abdominal pain, diarrhea, heartburn, nausea and vomiting.   Genitourinary:  Negative for dysuria, flank pain and hematuria.   Musculoskeletal:  Negative for back pain, joint pain, myalgias and neck pain.   Skin:  Negative for rash.   Neurological:  Positive for focal weakness and weakness. Negative for dizziness, sensory change, speech  change and headaches.   Psychiatric/Behavioral:  The patient is nervous/anxious.         Physical Exam  Temp:  [36.3 °C (97.4 °F)-36.9 °C (98.5 °F)] 36.3 °C (97.4 °F)  Pulse:  [] 98  Resp:  [18] 18  BP: ()/(80-86) 135/80  SpO2:  [94 %-98 %] 98 %    Physical Exam  Vitals and nursing note reviewed.   Constitutional:       Appearance: He is obese.   HENT:      Head: Normocephalic.      Nose: No congestion.      Mouth/Throat:      Mouth: Mucous membranes are moist.   Eyes:      Extraocular Movements: Extraocular movements intact.      Conjunctiva/sclera: Conjunctivae normal.   Cardiovascular:      Rate and Rhythm: Normal rate and regular rhythm.      Heart sounds: Murmur heard.   Pulmonary:      Effort: Pulmonary effort is normal.      Breath sounds: Normal breath sounds.   Abdominal:      General: There is no distension.      Tenderness: There is no abdominal tenderness. There is no guarding or rebound.   Musculoskeletal:      Cervical back: No tenderness.      Right lower leg: Edema present.      Left lower leg: Edema present.   Skin:     General: Skin is warm and dry.   Neurological:      Mental Status: He is alert and oriented to person, place, and time.      Cranial Nerves: Facial asymmetry present.      Motor: Weakness (MMT RUE 3/5, LUE 4+/5, RLE 3/5, LLE 4/5) present.   Psychiatric:         Mood and Affect: Affect is angry.         Speech: Speech is delayed.         Cognition and Memory: He exhibits impaired recent memory.         Fluids    Intake/Output Summary (Last 24 hours) at 10/14/2023 1359  Last data filed at 10/14/2023 1200  Gross per 24 hour   Intake 120 ml   Output 525 ml   Net -405 ml       Laboratory  Recent Labs     10/11/23  1604 10/13/23  0157   WBC 6.1 7.3   RBC 4.93 3.71*   HEMOGLOBIN 14.0 10.4*   HEMATOCRIT 42.8 32.7*   MCV 86.8 88.1   MCH 28.4 28.0   MCHC 32.7 31.8*   RDW 47.3 47.5   PLATELETCT 185 152*   MPV 11.0 11.1     Recent Labs     10/12/23  0120 10/13/23  0157 10/14/23  0839    SODIUM 138 135 137   POTASSIUM 4.7 4.6 4.7   CHLORIDE 105 107 108   CO2 21 17* 20   GLUCOSE 93 147* 108*   BUN 48* 49* 46*   CREATININE 3.22* 3.58* 3.38*   CALCIUM 9.5 9.2 9.1     Recent Labs     10/11/23  1604   APTT 30.1   INR 1.18*         Recent Labs     10/12/23  0120   TRIGLYCERIDE 68   HDL 31*   LDL 39       Imaging  US-RENAL   Final Result      1.  Echogenic kidneys suggesting chronic renal parenchymal disease.   2.  No hydronephrosis.      MR-BRAIN-W/O   Final Result         No acute intracranial process.      Age-related volume loss and chronic microvascular ischemic changes.      Multiple remote infarcts involving the right frontal, right parieto-occipital, left occipital, bilateral inferior cerebellar regions. These are stable from the previous MRI from 2019.      Stable foci of hemosiderin deposition in the bilateral inferior cerebellum are likely related to remote hypertensive microhemorrhages.      DX-CHEST-PORTABLE (1 VIEW)   Final Result      1.  Hypoinflation with minimal bibasilar atelectasis   2.  No pneumonia or pneumothorax.   3.  Stable cardiomegaly.      CT-CEREBRAL PERFUSION ANALYSIS   Final Result      1.  Cerebral blood flow less than 30% likely representing completed infarct = 0 mL.      2.  T Max more than 6 seconds likely representing combination of completed infarct and ischemia = 0 mL.      3.  Mismatched volume likely representing ischemic brain/penumbra = 0 mL.      Please note that the cerebral perfusion was performed on the limited brain tissue around the basal ganglia region. Infarct/ischemia outside the CT perfusion sections can be missed in this study.      CT-CTA NECK WITH & W/O-POST PROCESSING   Final Result      No focal high-grade stenosis, dissection or occlusion of the cervical carotid or vertebral arteries.      CT-CTA HEAD WITH & W/O-POST PROCESS   Final Result      No intracranial aneurysm, focal stenosis, or abrupt large vessel cut off.      CT-HEAD W/O   Final  Result      1.  No evidence of acute territorial infarct, intracranial hemorrhage or mass lesion.   2.  Moderate diffuse cerebral substance loss.   3.  Advanced microangiopathic ischemic change versus demyelination or gliosis.   4.  Areas of encephalomalacia in the right frontal and right occipital lobe which may related to previous infarct or trauma.              Assessment/Plan  * Stroke-like symptom- (present on admission)  Assessment & Plan  PT and OT        Acute kidney injury superimposed on chronic kidney disease (HCC)- (present on admission)  Assessment & Plan  Follow bmp  Hold Lisinopril, Eplerenone and Bumex    Pulmonary HTN (HCC)- (present on admission)  Assessment & Plan  Monitor volume status  Hold Bumex and Eplerenone due to FARZAD    Dysphagia- (present on admission)  Assessment & Plan  Level 6, Liquid level 0  SLP to follow  Aspiration precautions    History of CVA (cerebrovascular accident)- (present on admission)  Assessment & Plan  Eliquis, Lipitor  PT and OT    Cognitive disorder- (present on admission)  Assessment & Plan  Avoid Benzodiazepines and Anticholinergics  Frequent orientation  Open window blinds during the day  Avoid naps during the day  Family at bedside whenever possible  Ensure adequate sleep at night - use Melatonin preferably  Avoid early morning labs  Avoid vital signs during sleep  Ambulate if possible  Provide adequate pain control  Monitor for urinary retention  Prevent and manage constipation  Discontinue IVs, Catheters, Tubes, Lines, Cardiac monitors, SCDs if possible    Type 2 diabetes mellitus with kidney complication, without long-term current use of insulin (HCC)- (present on admission)  Assessment & Plan  Change to Diabetic diet    Elevated troponin- (present on admission)  Assessment & Plan  monitor    Stage 4 chronic kidney disease (HCC)- (present on admission)  Assessment & Plan  Follow bmp    H/O aortic valve replacement- (present on admission)  Assessment &  Plan  Eliquis  Monitor volume status      Essential hypertension- (present on admission)  Assessment & Plan  Decrease Toprol to 25 mg  Hold Lisinopril, Eplerenone due to FARZAD    AAA (abdominal aortic aneurysm) (HCC)- (present on admission)  Assessment & Plan  Follow up as outpatient    CAD (coronary artery disease)- (present on admission)  Assessment & Plan  History of RCA stent  Toprol, Lipitor, Eliquis    Cardiomyopathy (HCC)- (present on admission)  Assessment & Plan  Toprol  Hold Lisinopril, Eplerenone, Bumex  Monitor volume status    Anemia, chronic disease- (present on admission)  Assessment & Plan  Follow cbc         VTE prophylaxis:    therapeutic anticoagulation with eliquis 2.5 mg BID      I have performed a physical exam and reviewed and updated ROS and Plan today (10/14/2023). In review of yesterday's note (10/13/2023), there are no changes except as documented above.

## 2023-10-15 LAB
ANION GAP SERPL CALC-SCNC: 10 MMOL/L (ref 7–16)
BUN SERPL-MCNC: 42 MG/DL (ref 8–22)
CALCIUM SERPL-MCNC: 9.1 MG/DL (ref 8.5–10.5)
CHLORIDE SERPL-SCNC: 110 MMOL/L (ref 96–112)
CO2 SERPL-SCNC: 17 MMOL/L (ref 20–33)
CREAT SERPL-MCNC: 2.97 MG/DL (ref 0.5–1.4)
GFR SERPLBLD CREATININE-BSD FMLA CKD-EPI: 22 ML/MIN/1.73 M 2
GLUCOSE SERPL-MCNC: 111 MG/DL (ref 65–99)
NT-PROBNP SERPL IA-MCNC: 188 PG/ML (ref 0–125)
POTASSIUM SERPL-SCNC: 4.8 MMOL/L (ref 3.6–5.5)
SODIUM SERPL-SCNC: 137 MMOL/L (ref 135–145)

## 2023-10-15 PROCEDURE — 99232 SBSQ HOSP IP/OBS MODERATE 35: CPT | Performed by: FAMILY MEDICINE

## 2023-10-15 PROCEDURE — A9270 NON-COVERED ITEM OR SERVICE: HCPCS | Performed by: NURSE PRACTITIONER

## 2023-10-15 PROCEDURE — 700102 HCHG RX REV CODE 250 W/ 637 OVERRIDE(OP): Performed by: INTERNAL MEDICINE

## 2023-10-15 PROCEDURE — 83880 ASSAY OF NATRIURETIC PEPTIDE: CPT

## 2023-10-15 PROCEDURE — 700102 HCHG RX REV CODE 250 W/ 637 OVERRIDE(OP): Performed by: NURSE PRACTITIONER

## 2023-10-15 PROCEDURE — G0378 HOSPITAL OBSERVATION PER HR: HCPCS

## 2023-10-15 PROCEDURE — A9270 NON-COVERED ITEM OR SERVICE: HCPCS | Performed by: FAMILY MEDICINE

## 2023-10-15 PROCEDURE — 80048 BASIC METABOLIC PNL TOTAL CA: CPT

## 2023-10-15 PROCEDURE — 36415 COLL VENOUS BLD VENIPUNCTURE: CPT

## 2023-10-15 PROCEDURE — A9270 NON-COVERED ITEM OR SERVICE: HCPCS | Performed by: INTERNAL MEDICINE

## 2023-10-15 PROCEDURE — 700102 HCHG RX REV CODE 250 W/ 637 OVERRIDE(OP): Performed by: FAMILY MEDICINE

## 2023-10-15 RX ADMIN — ACETAMINOPHEN 325 MG: 325 TABLET, FILM COATED ORAL at 05:56

## 2023-10-15 RX ADMIN — APIXABAN 2.5 MG: 5 TABLET, FILM COATED ORAL at 05:56

## 2023-10-15 RX ADMIN — APIXABAN 2.5 MG: 5 TABLET, FILM COATED ORAL at 18:13

## 2023-10-15 RX ADMIN — ATORVASTATIN CALCIUM 80 MG: 80 TABLET, FILM COATED ORAL at 20:16

## 2023-10-15 RX ADMIN — ACETAMINOPHEN 325 MG: 325 TABLET, FILM COATED ORAL at 18:14

## 2023-10-15 RX ADMIN — TAMSULOSIN HYDROCHLORIDE 0.8 MG: 0.4 CAPSULE ORAL at 20:16

## 2023-10-15 RX ADMIN — METOPROLOL SUCCINATE 25 MG: 25 TABLET, EXTENDED RELEASE ORAL at 05:56

## 2023-10-15 RX ADMIN — CALCITRIOL CAPSULES 0.25 MCG 0.25 MCG: 0.25 CAPSULE ORAL at 05:56

## 2023-10-15 RX ADMIN — FINASTERIDE 5 MG: 5 TABLET, FILM COATED ORAL at 05:56

## 2023-10-15 ASSESSMENT — ENCOUNTER SYMPTOMS
CHILLS: 0
COUGH: 0
VOMITING: 0
SPEECH CHANGE: 0
BLURRED VISION: 0
DIAPHORESIS: 0
DIARRHEA: 0
SENSORY CHANGE: 0
DIZZINESS: 0
HEARTBURN: 0
HEADACHES: 0
FLANK PAIN: 0
NECK PAIN: 0
SORE THROAT: 0
NERVOUS/ANXIOUS: 1
MYALGIAS: 0
NAUSEA: 0
WHEEZING: 0
SHORTNESS OF BREATH: 0
BACK PAIN: 0
PALPITATIONS: 0
ABDOMINAL PAIN: 0
FOCAL WEAKNESS: 1
FEVER: 0
WEAKNESS: 1

## 2023-10-15 ASSESSMENT — COGNITIVE AND FUNCTIONAL STATUS - GENERAL
HELP NEEDED FOR BATHING: A LOT
MOVING FROM LYING ON BACK TO SITTING ON SIDE OF FLAT BED: UNABLE
SUGGESTED CMS G CODE MODIFIER DAILY ACTIVITY: CL
CLIMB 3 TO 5 STEPS WITH RAILING: TOTAL
SUGGESTED CMS G CODE MODIFIER MOBILITY: CM
TURNING FROM BACK TO SIDE WHILE IN FLAT BAD: UNABLE
MOBILITY SCORE: 7
TOILETING: A LOT
STANDING UP FROM CHAIR USING ARMS: A LOT
MOVING TO AND FROM BED TO CHAIR: UNABLE
WALKING IN HOSPITAL ROOM: TOTAL
DRESSING REGULAR UPPER BODY CLOTHING: A LOT
DRESSING REGULAR LOWER BODY CLOTHING: A LOT
DAILY ACTIVITIY SCORE: 12
EATING MEALS: A LOT
PERSONAL GROOMING: A LOT

## 2023-10-15 ASSESSMENT — PAIN DESCRIPTION - PAIN TYPE
TYPE: ACUTE PAIN

## 2023-10-15 ASSESSMENT — PATIENT HEALTH QUESTIONNAIRE - PHQ9
SUM OF ALL RESPONSES TO PHQ9 QUESTIONS 1 AND 2: 0
1. LITTLE INTEREST OR PLEASURE IN DOING THINGS: NOT AT ALL
2. FEELING DOWN, DEPRESSED, IRRITABLE, OR HOPELESS: NOT AT ALL

## 2023-10-15 NOTE — CARE PLAN
The patient is Stable - Low risk of patient condition declining or worsening    Shift Goals  Clinical Goals: skin integrity  Patient Goals: comfort  Family Goals: ericka    Progress made toward(s) clinical / shift goals:  patient remained pain free, q2 turns done, pt independent with his meal tray. Able to state and call for his needs  Problem: Skin Integrity  Goal: Skin integrity is maintained or improved  Outcome: Progressing       Patient is not progressing towards the following goals:

## 2023-10-15 NOTE — PROGRESS NOTES
Hospital Medicine Daily Progress Note    Date of Service  10/15/2023    Chief Complaint  Brannon Silver is a 68 y.o. male admitted 10/11/2023 with stroke-like symptoms    Hospital Course  Admitted with strokelike symptoms of right-sided weakness, aphasia, and increased confusion.  Patient with known history of CVA, AVR, was already on anticoagulation with Eliquis.  Neurology was consulted on the case, he was not a candidate for tPA.  MRI did not show any acute findings.  He was also noted to have FARZAD on CKD stage IV.  He has known history of cardiomyopathy, his Bumex, lisinopril, and eplerenone were all held.    Interval Problem Update  HTN - sbp 125-153  Cardiomyopathy - probnp 188  Diabetes -   FARZAD - crea 2.9    I have discussed this patient's plan of care and discharge plan at IDT rounds today with Case Management, Nursing, Nursing leadership, and other members of the IDT team.    Consultants/Specialty  neurology    Code Status  Full Code    Disposition  The patient is medically cleared for discharge to home or a post-acute facility.  Anticipate discharge to: skilled nursing facility    I have placed the appropriate orders for post-discharge needs.    Review of Systems  Review of Systems   Constitutional:  Positive for malaise/fatigue. Negative for chills, diaphoresis and fever.   HENT:  Negative for congestion, hearing loss and sore throat.    Eyes:  Negative for blurred vision.   Respiratory:  Negative for cough, shortness of breath and wheezing.    Cardiovascular:  Positive for leg swelling. Negative for chest pain and palpitations.   Gastrointestinal:  Negative for abdominal pain, diarrhea, heartburn, nausea and vomiting.   Genitourinary:  Negative for dysuria, flank pain and hematuria.   Musculoskeletal:  Negative for back pain, joint pain, myalgias and neck pain.   Skin:  Negative for rash.   Neurological:  Positive for focal weakness and weakness. Negative for dizziness, sensory change, speech  change and headaches.   Psychiatric/Behavioral:  The patient is nervous/anxious.         Physical Exam  Temp:  [36.2 °C (97.1 °F)-36.8 °C (98.2 °F)] 36.5 °C (97.7 °F)  Pulse:  [] 98  Resp:  [16-20] 17  BP: (125-153)/(80-94) 140/84  SpO2:  [94 %-100 %] 98 %    Physical Exam  Vitals and nursing note reviewed.   Constitutional:       Appearance: He is obese.   HENT:      Head: Normocephalic.      Nose: No congestion.      Mouth/Throat:      Mouth: Mucous membranes are moist.   Eyes:      Extraocular Movements: Extraocular movements intact.      Conjunctiva/sclera: Conjunctivae normal.   Cardiovascular:      Rate and Rhythm: Normal rate and regular rhythm.      Heart sounds: Murmur heard.   Pulmonary:      Effort: Pulmonary effort is normal.      Breath sounds: Normal breath sounds.   Abdominal:      General: There is no distension.      Tenderness: There is no abdominal tenderness. There is no guarding or rebound.   Musculoskeletal:      Cervical back: No tenderness.      Right lower leg: Edema present.      Left lower leg: Edema present.   Skin:     General: Skin is warm and dry.   Neurological:      Mental Status: He is alert and oriented to person, place, and time.      Cranial Nerves: Facial asymmetry present.      Motor: Weakness (MMT RUE 3/5, LUE 4+/5, RLE 3/5, LLE 4/5) present.   Psychiatric:         Mood and Affect: Affect is angry.         Speech: Speech is delayed.         Cognition and Memory: Memory is impaired. He exhibits impaired recent memory.         Fluids    Intake/Output Summary (Last 24 hours) at 10/15/2023 1035  Last data filed at 10/15/2023 0430  Gross per 24 hour   Intake 240 ml   Output 1500 ml   Net -1260 ml         Laboratory  Recent Labs     10/13/23  0157   WBC 7.3   RBC 3.71*   HEMOGLOBIN 10.4*   HEMATOCRIT 32.7*   MCV 88.1   MCH 28.0   MCHC 31.8*   RDW 47.5   PLATELETCT 152*   MPV 11.1       Recent Labs     10/13/23  0157 10/14/23  0839 10/15/23  0829   SODIUM 135 137 137    POTASSIUM 4.6 4.7 4.8   CHLORIDE 107 108 110   CO2 17* 20 17*   GLUCOSE 147* 108* 111*   BUN 49* 46* 42*   CREATININE 3.58* 3.38* 2.97*   CALCIUM 9.2 9.1 9.1                         Imaging  US-RENAL   Final Result      1.  Echogenic kidneys suggesting chronic renal parenchymal disease.   2.  No hydronephrosis.      MR-BRAIN-W/O   Final Result         No acute intracranial process.      Age-related volume loss and chronic microvascular ischemic changes.      Multiple remote infarcts involving the right frontal, right parieto-occipital, left occipital, bilateral inferior cerebellar regions. These are stable from the previous MRI from 2019.      Stable foci of hemosiderin deposition in the bilateral inferior cerebellum are likely related to remote hypertensive microhemorrhages.      DX-CHEST-PORTABLE (1 VIEW)   Final Result      1.  Hypoinflation with minimal bibasilar atelectasis   2.  No pneumonia or pneumothorax.   3.  Stable cardiomegaly.      CT-CEREBRAL PERFUSION ANALYSIS   Final Result      1.  Cerebral blood flow less than 30% likely representing completed infarct = 0 mL.      2.  T Max more than 6 seconds likely representing combination of completed infarct and ischemia = 0 mL.      3.  Mismatched volume likely representing ischemic brain/penumbra = 0 mL.      Please note that the cerebral perfusion was performed on the limited brain tissue around the basal ganglia region. Infarct/ischemia outside the CT perfusion sections can be missed in this study.      CT-CTA NECK WITH & W/O-POST PROCESSING   Final Result      No focal high-grade stenosis, dissection or occlusion of the cervical carotid or vertebral arteries.      CT-CTA HEAD WITH & W/O-POST PROCESS   Final Result      No intracranial aneurysm, focal stenosis, or abrupt large vessel cut off.      CT-HEAD W/O   Final Result      1.  No evidence of acute territorial infarct, intracranial hemorrhage or mass lesion.   2.  Moderate diffuse cerebral substance  loss.   3.  Advanced microangiopathic ischemic change versus demyelination or gliosis.   4.  Areas of encephalomalacia in the right frontal and right occipital lobe which may related to previous infarct or trauma.              Assessment/Plan  * Stroke-like symptom- (present on admission)  Assessment & Plan  PT and OT        Acute kidney injury superimposed on chronic kidney disease (HCC)- (present on admission)  Assessment & Plan  Follow bmp  Hold Lisinopril, Eplerenone and Bumex    Pulmonary HTN (HCC)- (present on admission)  Assessment & Plan  Monitor volume status  Hold Bumex and Eplerenone due to FARZAD    Dysphagia- (present on admission)  Assessment & Plan  Level 6, Liquid level 0  SLP to follow  Aspiration precautions    History of CVA (cerebrovascular accident)- (present on admission)  Assessment & Plan  Eliquis, Lipitor  PT and OT    Cognitive disorder- (present on admission)  Assessment & Plan  Avoid Benzodiazepines and Anticholinergics  Frequent orientation  Open window blinds during the day  Avoid naps during the day  Family at bedside whenever possible  Ensure adequate sleep at night - use Melatonin preferably  Avoid early morning labs  Avoid vital signs during sleep  Ambulate if possible  Provide adequate pain control  Monitor for urinary retention  Prevent and manage constipation  Discontinue IVs, Catheters, Tubes, Lines, Cardiac monitors, SCDs if possible    Type 2 diabetes mellitus with kidney complication, without long-term current use of insulin (HCC)- (present on admission)  Assessment & Plan  Diabetic diet    Elevated troponin- (present on admission)  Assessment & Plan  monitor    Stage 4 chronic kidney disease (HCC)- (present on admission)  Assessment & Plan  Follow bmp    H/O aortic valve replacement- (present on admission)  Assessment & Plan  Eliquis  Monitor volume status      Essential hypertension- (present on admission)  Assessment & Plan  Decreased Toprol to 25 mg  Hold Lisinopril,  Eplerenone due to FARZAD    AAA (abdominal aortic aneurysm) (HCC)- (present on admission)  Assessment & Plan  Follow up as outpatient    CAD (coronary artery disease)- (present on admission)  Assessment & Plan  History of RCA stent  Toprol, Lipitor, Eliquis    Cardiomyopathy (HCC)- (present on admission)  Assessment & Plan  Toprol  Hold Lisinopril, Eplerenone, Bumex  Monitor volume status    Anemia, chronic disease- (present on admission)  Assessment & Plan  Follow cbc         VTE prophylaxis:    therapeutic anticoagulation with eliquis 2.5 mg BID      I have performed a physical exam and reviewed and updated ROS and Plan today (10/15/2023). In review of yesterday's note (10/14/2023), there are no changes except as documented above.

## 2023-10-15 NOTE — CARE PLAN
The patient is Stable - Low risk of patient condition declining or worsening    Shift Goals  Clinical Goals: comfort and maintain skin integrity  Patient Goals: comfort  Family Goals: N/A    Progress made toward(s) clinical / shift goals:  Pt participating in Q2 turns and no complaints of pain.    Problem: Skin Integrity  Goal: Skin integrity is maintained or improved  Outcome: Progressing     Problem: Pain - Standard  Goal: Alleviation of pain or a reduction in pain to the patient’s comfort goal  Outcome: Progressing       Patient is not progressing towards the following goals:

## 2023-10-15 NOTE — PROGRESS NOTES
Rec'd report from sharmila díaz RN. Assumed pt care. Pt assessment completed and pt is AA&Ox2. Reoriented to place and situation. No complaints of pain at this time. Pt on RA and vss. Pt has condom catheter in place. All needs met. Bed locked, bed in lowest position, call light and personal belongings within reach and bed alarm in place for safety. Hourly rounding in place and Q2 turns enforced.

## 2023-10-16 LAB
ANION GAP SERPL CALC-SCNC: 9 MMOL/L (ref 7–16)
BUN SERPL-MCNC: 38 MG/DL (ref 8–22)
CALCIUM SERPL-MCNC: 9.3 MG/DL (ref 8.5–10.5)
CHLORIDE SERPL-SCNC: 106 MMOL/L (ref 96–112)
CO2 SERPL-SCNC: 20 MMOL/L (ref 20–33)
CREAT SERPL-MCNC: 2.89 MG/DL (ref 0.5–1.4)
ERYTHROCYTE [DISTWIDTH] IN BLOOD BY AUTOMATED COUNT: 45.7 FL (ref 35.9–50)
GFR SERPLBLD CREATININE-BSD FMLA CKD-EPI: 23 ML/MIN/1.73 M 2
GLUCOSE SERPL-MCNC: 105 MG/DL (ref 65–99)
HCT VFR BLD AUTO: 33.5 % (ref 42–52)
HGB BLD-MCNC: 11 G/DL (ref 14–18)
MAGNESIUM SERPL-MCNC: 2.1 MG/DL (ref 1.5–2.5)
MCH RBC QN AUTO: 28.2 PG (ref 27–33)
MCHC RBC AUTO-ENTMCNC: 32.8 G/DL (ref 32.3–36.5)
MCV RBC AUTO: 85.9 FL (ref 81.4–97.8)
NT-PROBNP SERPL IA-MCNC: 182 PG/ML (ref 0–125)
PLATELET # BLD AUTO: 132 K/UL (ref 164–446)
PMV BLD AUTO: 11.1 FL (ref 9–12.9)
POTASSIUM SERPL-SCNC: 4.8 MMOL/L (ref 3.6–5.5)
RBC # BLD AUTO: 3.9 M/UL (ref 4.7–6.1)
SODIUM SERPL-SCNC: 135 MMOL/L (ref 135–145)
WBC # BLD AUTO: 6.7 K/UL (ref 4.8–10.8)

## 2023-10-16 PROCEDURE — 80048 BASIC METABOLIC PNL TOTAL CA: CPT

## 2023-10-16 PROCEDURE — 700102 HCHG RX REV CODE 250 W/ 637 OVERRIDE(OP): Performed by: FAMILY MEDICINE

## 2023-10-16 PROCEDURE — 36415 COLL VENOUS BLD VENIPUNCTURE: CPT

## 2023-10-16 PROCEDURE — A9270 NON-COVERED ITEM OR SERVICE: HCPCS | Performed by: FAMILY MEDICINE

## 2023-10-16 PROCEDURE — 85027 COMPLETE CBC AUTOMATED: CPT

## 2023-10-16 PROCEDURE — 99232 SBSQ HOSP IP/OBS MODERATE 35: CPT | Performed by: FAMILY MEDICINE

## 2023-10-16 PROCEDURE — 86480 TB TEST CELL IMMUN MEASURE: CPT

## 2023-10-16 PROCEDURE — 700102 HCHG RX REV CODE 250 W/ 637 OVERRIDE(OP): Performed by: NURSE PRACTITIONER

## 2023-10-16 PROCEDURE — A9270 NON-COVERED ITEM OR SERVICE: HCPCS | Performed by: NURSE PRACTITIONER

## 2023-10-16 PROCEDURE — 97129 THER IVNTJ 1ST 15 MIN: CPT

## 2023-10-16 PROCEDURE — G0378 HOSPITAL OBSERVATION PER HR: HCPCS

## 2023-10-16 PROCEDURE — 83880 ASSAY OF NATRIURETIC PEPTIDE: CPT

## 2023-10-16 PROCEDURE — 92526 ORAL FUNCTION THERAPY: CPT

## 2023-10-16 PROCEDURE — A9270 NON-COVERED ITEM OR SERVICE: HCPCS | Performed by: INTERNAL MEDICINE

## 2023-10-16 PROCEDURE — 83735 ASSAY OF MAGNESIUM: CPT

## 2023-10-16 PROCEDURE — 700102 HCHG RX REV CODE 250 W/ 637 OVERRIDE(OP): Performed by: INTERNAL MEDICINE

## 2023-10-16 RX ADMIN — APIXABAN 2.5 MG: 5 TABLET, FILM COATED ORAL at 18:35

## 2023-10-16 RX ADMIN — TAMSULOSIN HYDROCHLORIDE 0.8 MG: 0.4 CAPSULE ORAL at 20:58

## 2023-10-16 RX ADMIN — APIXABAN 2.5 MG: 5 TABLET, FILM COATED ORAL at 04:59

## 2023-10-16 RX ADMIN — ATORVASTATIN CALCIUM 80 MG: 80 TABLET, FILM COATED ORAL at 20:58

## 2023-10-16 RX ADMIN — ACETAMINOPHEN 325 MG: 325 TABLET, FILM COATED ORAL at 18:35

## 2023-10-16 RX ADMIN — ACETAMINOPHEN 325 MG: 325 TABLET, FILM COATED ORAL at 04:59

## 2023-10-16 RX ADMIN — METOPROLOL SUCCINATE 25 MG: 25 TABLET, EXTENDED RELEASE ORAL at 04:59

## 2023-10-16 RX ADMIN — CALCITRIOL CAPSULES 0.25 MCG 0.25 MCG: 0.25 CAPSULE ORAL at 04:59

## 2023-10-16 RX ADMIN — FINASTERIDE 5 MG: 5 TABLET, FILM COATED ORAL at 04:59

## 2023-10-16 ASSESSMENT — ENCOUNTER SYMPTOMS
COUGH: 0
DIZZINESS: 0
SHORTNESS OF BREATH: 0
NERVOUS/ANXIOUS: 1
MYALGIAS: 0
WEAKNESS: 1
FLANK PAIN: 0
NAUSEA: 0
DIARRHEA: 0
FOCAL WEAKNESS: 1
FEVER: 0
BACK PAIN: 0
HEARTBURN: 0
VOMITING: 0
WHEEZING: 0
DIAPHORESIS: 0
SENSORY CHANGE: 0
ABDOMINAL PAIN: 0
NECK PAIN: 0
HEADACHES: 0
PALPITATIONS: 0
CHILLS: 0
BLURRED VISION: 0
SPEECH CHANGE: 0
SORE THROAT: 0

## 2023-10-16 ASSESSMENT — PATIENT HEALTH QUESTIONNAIRE - PHQ9
2. FEELING DOWN, DEPRESSED, IRRITABLE, OR HOPELESS: NOT AT ALL
SUM OF ALL RESPONSES TO PHQ9 QUESTIONS 1 AND 2: 0
1. LITTLE INTEREST OR PLEASURE IN DOING THINGS: NOT AT ALL
2. FEELING DOWN, DEPRESSED, IRRITABLE, OR HOPELESS: NOT AT ALL
1. LITTLE INTEREST OR PLEASURE IN DOING THINGS: NOT AT ALL
SUM OF ALL RESPONSES TO PHQ9 QUESTIONS 1 AND 2: 0

## 2023-10-16 ASSESSMENT — PAIN DESCRIPTION - PAIN TYPE
TYPE: ACUTE PAIN

## 2023-10-16 NOTE — THERAPY
Speech Language Pathology   Daily Treatment     Patient Name: Brannon Silver  AGE:  68 y.o., SEX:  male  Medical Record #: [id number]  Date of Service: 10/16/2023      Precautions:  Precautions: Fall Risk         Subjective  Patient seen this date for dysphagia management and to complete cognitive evaluation. Patient alert, agreeable to session, and positioned to upright in bed. RN reports no difficulty with meals and thinks patient could tolerate RG7 despite edentulism. Patient distractible and objectifying his nurse; redirected/educated.       Assessment  Dysphagia Treatment:  PO presentations of TN0 (straw) and RG7. Adequate oral bolus acceptance/containment. Complete AP transfer without notable oral bolus residue upon oral inspection. Adequate bite with functional/prolonged mastication of solids. No cough/throat clear appreciated with PO. Vocal quality remained stable throughout PO intake. Single swallow completed per bolus. No signs of esophageal dysfunction. Provided education regarding general aspiration precautions as well as signs of aspiration. All questions addressed. RN updated.      Cognitive Treatment:  Finished cognitive evaluation. Moderate impairment in calculations (1/4 correct). WFL performance on similarities and judgement tasks. Re: medication management - adequate dosage, inadequate schedule and external memory aids.      Clinical Impressions  Patient presents with oral phase deficits characterized by prolonged mastication of regular solids - consistent with edentulism. Will upgrade to regular solids; RN clear to downgrade to soft and bite size per patient request. Cognitive impairments in memory, orientation, calculations; impaired medication management. Given no acute findings on MRI brain and history of brain cancer, suspect baseline cognitive functioning. No further acute SLP intervention indicated.       Recommendations          Dysphagia Treatment  Diet Consistency: Regular solids with  "thin liquids; clear to downgrade to soft and bite size solids upon patient request  Instrumentation: None indicated at this time  Medication: As tolerated  Supervision: Assist with meal tray set up  Positioning: Fully upright and midline during oral intake  Risk Management : Small bites/sips, Slow rate of intake  Oral Care: BID         Cognitive Treatment  Supervision Needs Upons Discharge: Intermittent supervision throughout the day and direct assistance with IADLs (see below)  IADLs: Medication management, Financial management, Appointment management           SLP Treatment Plan  Treatment Plan: None Indicated (d/c secondary to goals met)            Anticipated Discharge Needs  Discharge Recommendations: Recommend home health for continued speech therapy services  Therapy Recommendations Upon DC: Cognitive-Linguistic Training      Patient / Family Goals  Patient / Family Goal #1: \"give me food\"  Goal #1 Outcome: Goal met  Short Term Goals  Short Term Goal # 1: Pt will consume a diet of soft and bite sized solids with thin liquids with no overt s/sx of aspiration or decline in respiratory status  Goal Outcome # 1: Goal met  Short Term Goal # 2: Pt will participate in complete cognitive evaluation to assess function of all cognitive domains  Goal Outcome # 2 : Goal met      JOSE Jacobs  "

## 2023-10-16 NOTE — DISCHARGE PLANNING
Case Management Discharge Planning    Admission Date: 10/11/2023  GMLOS:    ALOS: 0    6-Clicks ADL Score: 12  6-Clicks Mobility Score: 7  PT and/or OT Eval ordered: Yes  Post-acute Referrals Ordered: Yes  Post-acute Choice Obtained: Yes  Has referral(s) been sent to post-acute provider:  Yes      Anticipated Discharge Dispo: Discharge Disposition: D/T to home under [facility] care in anticipation of covered skilled care (06)    DME Needed: No    Action(s) Taken: Updated Provider/Nurse on Discharge Plan  TIANA NATH called Srikanth of [facility]. Per Srikanth she will come and evaluate Pt at bedside. H&P, Progress notes and Medlist faxed to [facility] as requested. [facility] on Pt's [facility].     RN PORTILLO called and left VM to Therese Pt's fiduciary to discuss payment for . Waiting for callback.     Quantiferon gold result pending.     Escalations Completed: None    Medically Clear: Yes    Next Steps: RN PORTILLO to continue to assist as needs arise.    Barriers to Discharge: Pending Placement    Is the patient up for discharge tomorrow: No

## 2023-10-16 NOTE — CARE PLAN
The patient is Stable - Low risk of patient condition declining or worsening    Shift Goals  Clinical Goals: increase bed mobility, maintain skin integrity  Patient Goals: comfort  Family Goals: N/A    Progress made toward(s) clinical / shift goals:  pt has no complaints of pain,able to call for help, q2 turns and pt understands and eager to cooperate with bed mobilities, such as repositioning, self feeding  Problem: Mobility - Stroke  Goal: Patient's capacity to carry out activities will improve  Outcome: Progressing  Goal: Spasticity will be prevented or improved  Outcome: Progressing       Patient is not progressing towards the following goals:

## 2023-10-16 NOTE — PROGRESS NOTES
Hospital Medicine Daily Progress Note    Date of Service  10/16/2023    Chief Complaint  Brannon Silver is a 68 y.o. male admitted 10/11/2023 with stroke-like symptoms    Hospital Course  Admitted with strokelike symptoms of right-sided weakness, aphasia, and increased confusion.  Patient with known history of CVA, AVR, was already on anticoagulation with Eliquis.  Neurology was consulted on the case, he was not a candidate for tPA.  MRI did not show any acute findings.  He was also noted to have FARZAD on CKD stage IV.  He has known history of cardiomyopathy, his Bumex, lisinopril, and eplerenone were all held.    Interval Problem Update  HTN - sbp 123-148  Cardiomyopathy - probnp 182  Diabetes -   FARZAD - crea 2.8    I have discussed this patient's plan of care and discharge plan at IDT rounds today with Case Management, Nursing, Nursing leadership, and other members of the IDT team.    Consultants/Specialty  neurology    Code Status  Full Code    Disposition  The patient is not medically cleared for discharge to home or a post-acute facility.  Anticipate discharge to: home with organized home healthcare and close outpatient follow-up    I have placed the appropriate orders for post-discharge needs.    Review of Systems  Review of Systems   Constitutional:  Positive for malaise/fatigue. Negative for chills, diaphoresis and fever.   HENT:  Negative for congestion, hearing loss and sore throat.    Eyes:  Negative for blurred vision.   Respiratory:  Negative for cough, shortness of breath and wheezing.    Cardiovascular:  Positive for leg swelling. Negative for chest pain and palpitations.   Gastrointestinal:  Negative for abdominal pain, diarrhea, heartburn, nausea and vomiting.   Genitourinary:  Negative for dysuria, flank pain and hematuria.   Musculoskeletal:  Negative for back pain, joint pain, myalgias and neck pain.   Skin:  Negative for rash.   Neurological:  Positive for focal weakness and weakness.  Negative for dizziness, sensory change, speech change and headaches.   Psychiatric/Behavioral:  The patient is nervous/anxious.         Physical Exam  Temp:  [36.1 °C (97 °F)-36.9 °C (98.4 °F)] 36.9 °C (98.4 °F)  Pulse:  [75-93] 83  Resp:  [17-20] 20  BP: (123-148)/() 132/90  SpO2:  [93 %-99 %] 99 %    Physical Exam  Vitals and nursing note reviewed.   Constitutional:       Appearance: He is obese.   HENT:      Head: Normocephalic.      Nose: No congestion.      Mouth/Throat:      Mouth: Mucous membranes are moist.   Eyes:      Extraocular Movements: Extraocular movements intact.      Conjunctiva/sclera: Conjunctivae normal.   Cardiovascular:      Rate and Rhythm: Normal rate and regular rhythm.      Heart sounds: Murmur heard.   Pulmonary:      Effort: Pulmonary effort is normal.      Breath sounds: Normal breath sounds.   Abdominal:      General: There is no distension.      Tenderness: There is no abdominal tenderness. There is no guarding or rebound.   Musculoskeletal:      Cervical back: No tenderness.      Right lower leg: Edema present.      Left lower leg: Edema present.   Skin:     General: Skin is warm and dry.   Neurological:      Mental Status: He is alert and oriented to person, place, and time.      Cranial Nerves: Facial asymmetry present.      Motor: Weakness (MMT RUE 3/5, LUE 4+/5, RLE 3/5, LLE 4/5) present.   Psychiatric:         Mood and Affect: Affect is angry.         Speech: Speech is delayed.         Cognition and Memory: Memory is impaired. He exhibits impaired recent memory.         Fluids    Intake/Output Summary (Last 24 hours) at 10/16/2023 1154  Last data filed at 10/16/2023 1000  Gross per 24 hour   Intake 840 ml   Output 1475 ml   Net -635 ml         Laboratory  Recent Labs     10/16/23  0825   WBC 6.7   RBC 3.90*   HEMOGLOBIN 11.0*   HEMATOCRIT 33.5*   MCV 85.9   MCH 28.2   MCHC 32.8   RDW 45.7   PLATELETCT 132*   MPV 11.1       Recent Labs     10/14/23  0847 10/15/23  0884  10/16/23  0825   SODIUM 137 137 135   POTASSIUM 4.7 4.8 4.8   CHLORIDE 108 110 106   CO2 20 17* 20   GLUCOSE 108* 111* 105*   BUN 46* 42* 38*   CREATININE 3.38* 2.97* 2.89*   CALCIUM 9.1 9.1 9.3                         Imaging  US-RENAL   Final Result      1.  Echogenic kidneys suggesting chronic renal parenchymal disease.   2.  No hydronephrosis.      MR-BRAIN-W/O   Final Result         No acute intracranial process.      Age-related volume loss and chronic microvascular ischemic changes.      Multiple remote infarcts involving the right frontal, right parieto-occipital, left occipital, bilateral inferior cerebellar regions. These are stable from the previous MRI from 2019.      Stable foci of hemosiderin deposition in the bilateral inferior cerebellum are likely related to remote hypertensive microhemorrhages.      DX-CHEST-PORTABLE (1 VIEW)   Final Result      1.  Hypoinflation with minimal bibasilar atelectasis   2.  No pneumonia or pneumothorax.   3.  Stable cardiomegaly.      CT-CEREBRAL PERFUSION ANALYSIS   Final Result      1.  Cerebral blood flow less than 30% likely representing completed infarct = 0 mL.      2.  T Max more than 6 seconds likely representing combination of completed infarct and ischemia = 0 mL.      3.  Mismatched volume likely representing ischemic brain/penumbra = 0 mL.      Please note that the cerebral perfusion was performed on the limited brain tissue around the basal ganglia region. Infarct/ischemia outside the CT perfusion sections can be missed in this study.      CT-CTA NECK WITH & W/O-POST PROCESSING   Final Result      No focal high-grade stenosis, dissection or occlusion of the cervical carotid or vertebral arteries.      CT-CTA HEAD WITH & W/O-POST PROCESS   Final Result      No intracranial aneurysm, focal stenosis, or abrupt large vessel cut off.      CT-HEAD W/O   Final Result      1.  No evidence of acute territorial infarct, intracranial hemorrhage or mass lesion.   2.   Moderate diffuse cerebral substance loss.   3.  Advanced microangiopathic ischemic change versus demyelination or gliosis.   4.  Areas of encephalomalacia in the right frontal and right occipital lobe which may related to previous infarct or trauma.              Assessment/Plan  * Stroke-like symptom- (present on admission)  Assessment & Plan  PT and OT        Acute kidney injury superimposed on chronic kidney disease (HCC)- (present on admission)  Assessment & Plan  Follow bmp  Hold Lisinopril, Eplerenone and Bumex    Pulmonary HTN (HCC)- (present on admission)  Assessment & Plan  Monitor volume status  Hold Bumex and Eplerenone due to FARZAD     Dysphagia- (present on admission)  Assessment & Plan  Level 6, Liquid level 0  SLP to follow  Aspiration precautions    History of CVA (cerebrovascular accident)- (present on admission)  Assessment & Plan  Eliquis, Lipitor  PT and OT    Cognitive disorder- (present on admission)  Assessment & Plan  Avoid Benzodiazepines and Anticholinergics  Frequent orientation  Open window blinds during the day  Avoid naps during the day  Family at bedside whenever possible  Ensure adequate sleep at night - use Melatonin preferably  Avoid early morning labs  Avoid vital signs during sleep  Ambulate if possible  Provide adequate pain control  Monitor for urinary retention  Prevent and manage constipation  Discontinue IVs, Catheters, Tubes, Lines, Cardiac monitors, SCDs if possible    Type 2 diabetes mellitus with kidney complication, without long-term current use of insulin (HCC)- (present on admission)  Assessment & Plan  Diabetic diet    Elevated troponin- (present on admission)  Assessment & Plan  monitor    Stage 4 chronic kidney disease (HCC)- (present on admission)  Assessment & Plan  Follow bmp    H/O aortic valve replacement- (present on admission)  Assessment & Plan  Eliquis  Monitor volume status      Essential hypertension- (present on admission)  Assessment & Plan  Toprol to 25  mg  Hold Lisinopril, Eplerenone due to FARZAD    AAA (abdominal aortic aneurysm) (HCC)- (present on admission)  Assessment & Plan  Follow up as outpatient    CAD (coronary artery disease)- (present on admission)  Assessment & Plan  History of RCA stent  Toprol, Lipitor, Eliquis    Cardiomyopathy (HCC)- (present on admission)  Assessment & Plan  Toprol  Hold Lisinopril, Eplerenone, Bumex   Monitor volume status    Anemia, chronic disease- (present on admission)  Assessment & Plan  Follow cbc         VTE prophylaxis:    therapeutic anticoagulation with eliquis 2.5 mg BID      I have performed a physical exam and reviewed and updated ROS and Plan today (10/16/2023). In review of yesterday's note (10/15/2023), there are no changes except as documented above.

## 2023-10-17 LAB
APPEARANCE UR: ABNORMAL
BACTERIA #/AREA URNS HPF: ABNORMAL /HPF
BILIRUB UR QL STRIP.AUTO: NEGATIVE
COLOR UR: YELLOW
EPI CELLS #/AREA URNS HPF: NEGATIVE /HPF
GAMMA INTERFERON BACKGROUND BLD IA-ACNC: 0.02 IU/ML
GLUCOSE UR STRIP.AUTO-MCNC: NEGATIVE MG/DL
HYALINE CASTS #/AREA URNS LPF: ABNORMAL /LPF
KETONES UR STRIP.AUTO-MCNC: NEGATIVE MG/DL
LEUKOCYTE ESTERASE UR QL STRIP.AUTO: ABNORMAL
M TB IFN-G BLD-IMP: NEGATIVE
M TB IFN-G CD4+ BCKGRND COR BLD-ACNC: 0 IU/ML
MICRO URNS: ABNORMAL
MITOGEN IGNF BCKGRD COR BLD-ACNC: 7.86 IU/ML
NITRITE UR QL STRIP.AUTO: NEGATIVE
PH UR STRIP.AUTO: 5.5 [PH] (ref 5–8)
PROT UR QL STRIP: 100 MG/DL
QFT TB2 - NIL TBQ2: 0.01 IU/ML
RBC # URNS HPF: ABNORMAL /HPF
RBC UR QL AUTO: ABNORMAL
SP GR UR STRIP.AUTO: 1.01
UROBILINOGEN UR STRIP.AUTO-MCNC: 0.2 MG/DL
WBC #/AREA URNS HPF: ABNORMAL /HPF

## 2023-10-17 PROCEDURE — 700102 HCHG RX REV CODE 250 W/ 637 OVERRIDE(OP): Performed by: INTERNAL MEDICINE

## 2023-10-17 PROCEDURE — G0378 HOSPITAL OBSERVATION PER HR: HCPCS

## 2023-10-17 PROCEDURE — 81001 URINALYSIS AUTO W/SCOPE: CPT

## 2023-10-17 PROCEDURE — 700102 HCHG RX REV CODE 250 W/ 637 OVERRIDE(OP): Performed by: FAMILY MEDICINE

## 2023-10-17 PROCEDURE — A9270 NON-COVERED ITEM OR SERVICE: HCPCS | Performed by: INTERNAL MEDICINE

## 2023-10-17 PROCEDURE — 99232 SBSQ HOSP IP/OBS MODERATE 35: CPT | Performed by: HOSPITALIST

## 2023-10-17 PROCEDURE — A9270 NON-COVERED ITEM OR SERVICE: HCPCS | Performed by: NURSE PRACTITIONER

## 2023-10-17 PROCEDURE — A9270 NON-COVERED ITEM OR SERVICE: HCPCS | Performed by: FAMILY MEDICINE

## 2023-10-17 PROCEDURE — 700102 HCHG RX REV CODE 250 W/ 637 OVERRIDE(OP): Performed by: NURSE PRACTITIONER

## 2023-10-17 RX ADMIN — APIXABAN 2.5 MG: 5 TABLET, FILM COATED ORAL at 17:31

## 2023-10-17 RX ADMIN — ACETAMINOPHEN 325 MG: 325 TABLET, FILM COATED ORAL at 05:50

## 2023-10-17 RX ADMIN — ATORVASTATIN CALCIUM 80 MG: 80 TABLET, FILM COATED ORAL at 20:33

## 2023-10-17 RX ADMIN — ACETAMINOPHEN 325 MG: 325 TABLET, FILM COATED ORAL at 17:31

## 2023-10-17 RX ADMIN — TAMSULOSIN HYDROCHLORIDE 0.8 MG: 0.4 CAPSULE ORAL at 20:33

## 2023-10-17 RX ADMIN — CALCITRIOL CAPSULES 0.25 MCG 0.25 MCG: 0.25 CAPSULE ORAL at 05:50

## 2023-10-17 RX ADMIN — FINASTERIDE 5 MG: 5 TABLET, FILM COATED ORAL at 05:50

## 2023-10-17 RX ADMIN — METOPROLOL SUCCINATE 25 MG: 25 TABLET, EXTENDED RELEASE ORAL at 05:51

## 2023-10-17 RX ADMIN — APIXABAN 2.5 MG: 5 TABLET, FILM COATED ORAL at 05:51

## 2023-10-17 ASSESSMENT — ENCOUNTER SYMPTOMS
HEADACHES: 0
BLURRED VISION: 0
WHEEZING: 0
ABDOMINAL PAIN: 0
BACK PAIN: 0
NAUSEA: 0
DIAPHORESIS: 0
NECK PAIN: 0
WEAKNESS: 1
PALPITATIONS: 0
COUGH: 0
SENSORY CHANGE: 0
HEARTBURN: 0
SHORTNESS OF BREATH: 0
CHILLS: 0
DIZZINESS: 0
VOMITING: 0
SORE THROAT: 0
FEVER: 0
SPEECH CHANGE: 0
NERVOUS/ANXIOUS: 0
FOCAL WEAKNESS: 0
MYALGIAS: 0
FLANK PAIN: 0
DIARRHEA: 0

## 2023-10-17 ASSESSMENT — PAIN DESCRIPTION - PAIN TYPE
TYPE: ACUTE PAIN

## 2023-10-17 ASSESSMENT — PATIENT HEALTH QUESTIONNAIRE - PHQ9
SUM OF ALL RESPONSES TO PHQ9 QUESTIONS 1 AND 2: 0
2. FEELING DOWN, DEPRESSED, IRRITABLE, OR HOPELESS: NOT AT ALL
1. LITTLE INTEREST OR PLEASURE IN DOING THINGS: NOT AT ALL

## 2023-10-17 NOTE — CARE PLAN
The patient is Stable - Low risk of patient condition declining or worsening    Shift Goals  Clinical Goals: upgrade diet order  Patient Goals: eat regular food  Family Goals: N/A    Progress made toward(s) clinical / shift goals:  Pt was evaluated by SLP and upgraded diet for patient to eat regular food. Pt tolerated dinner eating regular food.   Problem: Self Care  Goal: Patient will have the ability to perform ADLs independently or with assistance (bathe, groom, dress, toilet and feed)  Outcome: Not Progressing       Patient is not progressing towards the following goals:      Problem: Self Care  Goal: Patient will have the ability to perform ADLs independently or with assistance (bathe, groom, dress, toilet and feed)  Outcome: Not Progressing

## 2023-10-17 NOTE — CARE PLAN
The patient is Stable - Low risk of patient condition declining or worsening    Shift Goals  Clinical Goals: diet tolerance, placement  Patient Goals: sleep, eat  Family Goals: NEELAM    Progress made toward(s) clinical / shift goals:        Problem: Neuro Status  Goal: Neuro status will remain stable or improve  Outcome: Progressing  Note: Neuro status stable evidenced by no change in orientation, no new onset symptoms      Problem: Fall Risk  Goal: Patient will remain free from falls  Outcome: Progressing  Note: Pt free of falls, fall prevention measures in place and charted.      Problem: Pain - Standard  Goal: Alleviation of pain or a reduction in pain to the patient’s comfort goal  Outcome: Progressing  Note: Patient maintains 0 pain level with interventions in place.        Patient is not progressing towards the following goals:

## 2023-10-17 NOTE — PROGRESS NOTES
Hospital Medicine Daily Progress Note    Date of Service  10/17/2023    Chief Complaint  Brannon Silver is a 68 y.o. male admitted 10/11/2023 with stroke-like symptoms    Hospital Course  Admitted with strokelike symptoms of right-sided weakness, aphasia, and increased confusion.  Patient with known history of CVA, AVR, was already on anticoagulation with Eliquis.  Neurology was consulted on the case, he was not a candidate for tPA.  MRI did not show any acute findings.  He was also noted to have FARZAD on CKD stage IV.  He has known history of cardiomyopathy, his Bumex, lisinopril, and eplerenone were all held.    Interval Problem Update    Patient has no new complaints    I have filled out paperwork for the group home    Creatinine is elevated at 2.89      Chart review his baseline creatinine is about 2.49- 2.6    I have discussed this patient's plan of care and discharge plan at IDT rounds today with Case Management, Nursing, Nursing leadership, and other members of the IDT team.    Consultants/Specialty  neurology    Code Status  Full Code    Disposition  The patient is medically cleared for discharge to home or a post-acute facility.  Anticipate discharge to: home with organized home healthcare and close outpatient follow-up    I have placed the appropriate orders for post-discharge needs.    Review of Systems  Review of Systems   Constitutional:  Positive for malaise/fatigue. Negative for chills, diaphoresis and fever.   HENT:  Negative for congestion, hearing loss and sore throat.    Eyes:  Negative for blurred vision.   Respiratory:  Negative for cough, shortness of breath and wheezing.    Cardiovascular:  Negative for chest pain, palpitations and leg swelling.   Gastrointestinal:  Negative for abdominal pain, diarrhea, heartburn, nausea and vomiting.   Genitourinary:  Negative for dysuria, flank pain and hematuria.   Musculoskeletal:  Negative for back pain, joint pain, myalgias and neck pain.   Skin:   Negative for rash.   Neurological:  Positive for weakness. Negative for dizziness, sensory change, speech change, focal weakness and headaches.   Psychiatric/Behavioral:  The patient is not nervous/anxious.         Physical Exam  Temp:  [36.5 °C (97.7 °F)-36.8 °C (98.2 °F)] 36.7 °C (98.1 °F)  Pulse:  [86-98] 97  Resp:  [17-20] 20  BP: (115-140)/() 115/73  SpO2:  [93 %-97 %] 97 %    Physical Exam  Vitals and nursing note reviewed.   Constitutional:       Appearance: He is obese.   HENT:      Head: Normocephalic.      Nose: No congestion.      Mouth/Throat:      Mouth: Mucous membranes are moist.   Eyes:      Extraocular Movements: Extraocular movements intact.      Conjunctiva/sclera: Conjunctivae normal.   Cardiovascular:      Rate and Rhythm: Normal rate and regular rhythm.      Heart sounds: Murmur heard.   Pulmonary:      Effort: Pulmonary effort is normal.      Breath sounds: Normal breath sounds.   Abdominal:      General: There is no distension.      Tenderness: There is no abdominal tenderness. There is no guarding or rebound.   Musculoskeletal:      Cervical back: No tenderness.      Right lower leg: Edema present.      Left lower leg: Edema present.   Skin:     General: Skin is warm and dry.   Neurological:      Mental Status: He is alert and oriented to person, place, and time.      Cranial Nerves: Facial asymmetry present.      Motor: Weakness present.   Psychiatric:         Speech: Speech is delayed.         Cognition and Memory: Memory is impaired. He exhibits impaired recent memory.         Fluids    Intake/Output Summary (Last 24 hours) at 10/17/2023 1218  Last data filed at 10/17/2023 0540  Gross per 24 hour   Intake 240 ml   Output 1800 ml   Net -1560 ml         Laboratory  Recent Labs     10/16/23  0825   WBC 6.7   RBC 3.90*   HEMOGLOBIN 11.0*   HEMATOCRIT 33.5*   MCV 85.9   MCH 28.2   MCHC 32.8   RDW 45.7   PLATELETCT 132*   MPV 11.1       Recent Labs     10/15/23  0829 10/16/23  0825    SODIUM 137 135   POTASSIUM 4.8 4.8   CHLORIDE 110 106   CO2 17* 20   GLUCOSE 111* 105*   BUN 42* 38*   CREATININE 2.97* 2.89*   CALCIUM 9.1 9.3                         Imaging  US-RENAL   Final Result      1.  Echogenic kidneys suggesting chronic renal parenchymal disease.   2.  No hydronephrosis.      MR-BRAIN-W/O   Final Result         No acute intracranial process.      Age-related volume loss and chronic microvascular ischemic changes.      Multiple remote infarcts involving the right frontal, right parieto-occipital, left occipital, bilateral inferior cerebellar regions. These are stable from the previous MRI from 2019.      Stable foci of hemosiderin deposition in the bilateral inferior cerebellum are likely related to remote hypertensive microhemorrhages.      DX-CHEST-PORTABLE (1 VIEW)   Final Result      1.  Hypoinflation with minimal bibasilar atelectasis   2.  No pneumonia or pneumothorax.   3.  Stable cardiomegaly.      CT-CEREBRAL PERFUSION ANALYSIS   Final Result      1.  Cerebral blood flow less than 30% likely representing completed infarct = 0 mL.      2.  T Max more than 6 seconds likely representing combination of completed infarct and ischemia = 0 mL.      3.  Mismatched volume likely representing ischemic brain/penumbra = 0 mL.      Please note that the cerebral perfusion was performed on the limited brain tissue around the basal ganglia region. Infarct/ischemia outside the CT perfusion sections can be missed in this study.      CT-CTA NECK WITH & W/O-POST PROCESSING   Final Result      No focal high-grade stenosis, dissection or occlusion of the cervical carotid or vertebral arteries.      CT-CTA HEAD WITH & W/O-POST PROCESS   Final Result      No intracranial aneurysm, focal stenosis, or abrupt large vessel cut off.      CT-HEAD W/O   Final Result      1.  No evidence of acute territorial infarct, intracranial hemorrhage or mass lesion.   2.  Moderate diffuse cerebral substance loss.   3.   Advanced microangiopathic ischemic change versus demyelination or gliosis.   4.  Areas of encephalomalacia in the right frontal and right occipital lobe which may related to previous infarct or trauma.              Assessment/Plan  * Stroke-like symptom- (present on admission)  Assessment & Plan  PT and OT        Pulmonary HTN (HCC)- (present on admission)  Assessment & Plan  Monitor volume status  Hold Bumex and Eplerenone due to FARZAD     Dysphagia- (present on admission)  Assessment & Plan  Level 6, Liquid level 0  SLP to follow  Aspiration precautions    History of CVA (cerebrovascular accident)- (present on admission)  Assessment & Plan  Eliquis, Lipitor  PT and OT    Cognitive disorder- (present on admission)  Assessment & Plan  Avoid Benzodiazepines and Anticholinergics  Frequent orientation  Open window blinds during the day  Avoid naps during the day  Family at bedside whenever possible  Ensure adequate sleep at night - use Melatonin preferably  Avoid early morning labs  Avoid vital signs during sleep  Ambulate if possible  Provide adequate pain control  Monitor for urinary retention  Prevent and manage constipation  Discontinue IVs, Catheters, Tubes, Lines, Cardiac monitors, SCDs if possible    Type 2 diabetes mellitus with kidney complication, without long-term current use of insulin (HCC)- (present on admission)  Assessment & Plan  Diabetic diet    Elevated troponin- (present on admission)  Assessment & Plan  monitor    Acute kidney injury superimposed on chronic kidney disease (HCC)- (present on admission)  Assessment & Plan  Follow bmp  Hold Lisinopril, Eplerenone and Bumex    Stage 4 chronic kidney disease (HCC)- (present on admission)  Assessment & Plan  Follow bmp    Anemia, chronic disease- (present on admission)  Assessment & Plan  Follow cbc    H/O aortic valve replacement- (present on admission)  Assessment & Plan  Eliquis  Monitor volume status      Essential hypertension- (present on  admission)  Assessment & Plan  Toprol to 25 mg  Hold Lisinopril, Eplerenone due to FARZAD    AAA (abdominal aortic aneurysm) (HCC)- (present on admission)  Assessment & Plan  Follow up as outpatient    CAD (coronary artery disease)- (present on admission)  Assessment & Plan  History of RCA stent  Toprol, Lipitor, Eliquis    Cardiomyopathy (HCC)- (present on admission)  Assessment & Plan  Toprol  Hold Lisinopril, Eplerenone, Bumex   Monitor volume status         VTE prophylaxis:   SCDs/TEDs      I have performed a physical exam and reviewed and updated ROS and Plan today (10/17/2023). In review of yesterday's note (10/16/2023), there are no changes except as documented above.

## 2023-10-17 NOTE — CARE PLAN
The patient is Stable - Low risk of patient condition declining or worsening    Shift Goals  Clinical Goals: tolerate new diet,maintain skin integrity  Patient Goals: sleep  Family Goals: N/A    Progress made toward(s) clinical / shift goals:  pt tolerated diet, self feeds and motivated to cooperate with turns and do self care  independently such as brushing and using urinal.no pain complaints, sacral mepilex and moon boots applied for  prevention  Problem: Mobility - Stroke  Goal: Patient's capacity to carry out activities will improve  Outcome: Progressing  Goal: Spasticity will be prevented or improved  Outcome: Progressing       Patient is not progressing towards the following goals:

## 2023-10-17 NOTE — DISCHARGE PLANNING
Care Transition Team Discharge Planning    Anticipated Discharge Information  Discharge Disposition: D/T to home under [facility] care in anticipation of covered skilled care (06)    Discharge Plan:  RN PORTILLO called Pt's fiduciary and spoke to Therese. She confirmed they cover $2000 per month for group home and she also said in addition Pt has a Group home waiver.     1130, [facility] seen Pt at bedside and confirmed acceptance.    1400, TIANA NATH spoke to Srikanth, owner of [facility] that she [facility] informing [facility] that [facility] has a rios lift. Srikanth said that she will call Pt's previous group home.     1428, received a phone call from PAULA Hay  and confirmed that Pt is on Care Level 3. Bharti was informed regarding the new group home that accepted Pt.    5339, Paper works requested by Group Home fax back to them.

## 2023-10-17 NOTE — PROGRESS NOTES
Received report from [facility] nurse. Pt on RA sleeping comfortably, calm, unlabored breathing. Plan of care reviewed. Call light and personal items within reach, bed locked and in lowest position, bed alarm on. No further needs at this time.

## 2023-10-18 ENCOUNTER — PHARMACY VISIT (OUTPATIENT)
Dept: PHARMACY | Facility: MEDICAL CENTER | Age: 68
End: 2023-10-18
Payer: COMMERCIAL

## 2023-10-18 VITALS
OXYGEN SATURATION: 97 % | HEIGHT: 71 IN | DIASTOLIC BLOOD PRESSURE: 77 MMHG | TEMPERATURE: 97.9 F | BODY MASS INDEX: 33.73 KG/M2 | WEIGHT: 240.96 LBS | HEART RATE: 100 BPM | RESPIRATION RATE: 18 BRPM | SYSTOLIC BLOOD PRESSURE: 110 MMHG

## 2023-10-18 PROBLEM — N17.9 ACUTE KIDNEY INJURY SUPERIMPOSED ON CHRONIC KIDNEY DISEASE (HCC): Status: RESOLVED | Noted: 2023-10-11 | Resolved: 2023-10-18

## 2023-10-18 PROBLEM — R79.89 ELEVATED TROPONIN: Status: RESOLVED | Noted: 2023-10-11 | Resolved: 2023-10-18

## 2023-10-18 PROBLEM — R29.90 STROKE-LIKE SYMPTOM: Status: RESOLVED | Noted: 2023-10-11 | Resolved: 2023-10-18

## 2023-10-18 PROBLEM — N18.9 ACUTE KIDNEY INJURY SUPERIMPOSED ON CHRONIC KIDNEY DISEASE (HCC): Status: RESOLVED | Noted: 2023-10-11 | Resolved: 2023-10-18

## 2023-10-18 LAB
ANION GAP SERPL CALC-SCNC: 12 MMOL/L (ref 7–16)
BUN SERPL-MCNC: 38 MG/DL (ref 8–22)
CALCIUM SERPL-MCNC: 9.3 MG/DL (ref 8.5–10.5)
CHLORIDE SERPL-SCNC: 105 MMOL/L (ref 96–112)
CO2 SERPL-SCNC: 18 MMOL/L (ref 20–33)
CREAT SERPL-MCNC: 2.8 MG/DL (ref 0.5–1.4)
ERYTHROCYTE [DISTWIDTH] IN BLOOD BY AUTOMATED COUNT: 45.9 FL (ref 35.9–50)
GFR SERPLBLD CREATININE-BSD FMLA CKD-EPI: 24 ML/MIN/1.73 M 2
GLUCOSE SERPL-MCNC: 107 MG/DL (ref 65–99)
HCT VFR BLD AUTO: 34 % (ref 42–52)
HGB BLD-MCNC: 11.4 G/DL (ref 14–18)
MCH RBC QN AUTO: 28.6 PG (ref 27–33)
MCHC RBC AUTO-ENTMCNC: 33.5 G/DL (ref 32.3–36.5)
MCV RBC AUTO: 85.4 FL (ref 81.4–97.8)
PLATELET # BLD AUTO: 136 K/UL (ref 164–446)
PMV BLD AUTO: 11.5 FL (ref 9–12.9)
POTASSIUM SERPL-SCNC: 4.4 MMOL/L (ref 3.6–5.5)
RBC # BLD AUTO: 3.98 M/UL (ref 4.7–6.1)
SODIUM SERPL-SCNC: 135 MMOL/L (ref 135–145)
WBC # BLD AUTO: 6.6 K/UL (ref 4.8–10.8)

## 2023-10-18 PROCEDURE — 700102 HCHG RX REV CODE 250 W/ 637 OVERRIDE(OP): Performed by: INTERNAL MEDICINE

## 2023-10-18 PROCEDURE — 80048 BASIC METABOLIC PNL TOTAL CA: CPT

## 2023-10-18 PROCEDURE — 99239 HOSP IP/OBS DSCHRG MGMT >30: CPT | Performed by: HOSPITALIST

## 2023-10-18 PROCEDURE — 700102 HCHG RX REV CODE 250 W/ 637 OVERRIDE(OP): Performed by: NURSE PRACTITIONER

## 2023-10-18 PROCEDURE — 85027 COMPLETE CBC AUTOMATED: CPT

## 2023-10-18 PROCEDURE — A9270 NON-COVERED ITEM OR SERVICE: HCPCS | Performed by: INTERNAL MEDICINE

## 2023-10-18 PROCEDURE — 36415 COLL VENOUS BLD VENIPUNCTURE: CPT

## 2023-10-18 PROCEDURE — G0378 HOSPITAL OBSERVATION PER HR: HCPCS

## 2023-10-18 PROCEDURE — RXMED WILLOW AMBULATORY MEDICATION CHARGE: Performed by: HOSPITALIST

## 2023-10-18 PROCEDURE — A9270 NON-COVERED ITEM OR SERVICE: HCPCS | Performed by: NURSE PRACTITIONER

## 2023-10-18 RX ORDER — TAMSULOSIN HYDROCHLORIDE 0.4 MG/1
0.8 CAPSULE ORAL EVERY EVENING
Qty: 30 CAPSULE | Refills: 0 | Status: SHIPPED | OUTPATIENT
Start: 2023-10-18

## 2023-10-18 RX ORDER — METOPROLOL SUCCINATE 25 MG/1
25 TABLET, EXTENDED RELEASE ORAL EVERY MORNING
Qty: 30 TABLET | Refills: 0 | Status: SHIPPED | OUTPATIENT
Start: 2023-10-18

## 2023-10-18 RX ORDER — ATORVASTATIN CALCIUM 40 MG/1
40 TABLET, FILM COATED ORAL EVERY EVENING
Qty: 30 TABLET | Refills: 0 | Status: SHIPPED | OUTPATIENT
Start: 2023-10-18

## 2023-10-18 RX ORDER — CALCITRIOL 0.25 UG/1
0.25 CAPSULE, LIQUID FILLED ORAL EVERY MORNING
Qty: 30 CAPSULE | Refills: 0 | Status: SHIPPED | OUTPATIENT
Start: 2023-10-18

## 2023-10-18 RX ORDER — FERROUS SULFATE 325(65) MG
325 TABLET ORAL
Qty: 30 TABLET | Refills: 0 | Status: SHIPPED | OUTPATIENT
Start: 2023-10-18

## 2023-10-18 RX ORDER — FINASTERIDE 5 MG/1
5 TABLET, FILM COATED ORAL EVERY MORNING
Qty: 30 TABLET | Refills: 0 | Status: SHIPPED | OUTPATIENT
Start: 2023-10-18

## 2023-10-18 RX ORDER — VITAMIN B COMPLEX
1000 TABLET ORAL EVERY MORNING
Qty: 60 TABLET | Refills: 0 | Status: SHIPPED | OUTPATIENT
Start: 2023-10-18

## 2023-10-18 RX ORDER — BUMETANIDE 1 MG/1
1 TABLET ORAL DAILY
Qty: 30 TABLET | Refills: 0 | Status: SHIPPED | OUTPATIENT
Start: 2023-10-18

## 2023-10-18 RX ORDER — ACETAMINOPHEN 325 MG/1
325 TABLET ORAL 2 TIMES DAILY
Qty: 30 TABLET | Refills: 0 | Status: SHIPPED | OUTPATIENT
Start: 2023-10-18

## 2023-10-18 RX ADMIN — ACETAMINOPHEN 325 MG: 325 TABLET, FILM COATED ORAL at 05:52

## 2023-10-18 RX ADMIN — APIXABAN 2.5 MG: 5 TABLET, FILM COATED ORAL at 05:52

## 2023-10-18 RX ADMIN — FINASTERIDE 5 MG: 5 TABLET, FILM COATED ORAL at 05:51

## 2023-10-18 RX ADMIN — CALCITRIOL CAPSULES 0.25 MCG 0.25 MCG: 0.25 CAPSULE ORAL at 05:52

## 2023-10-18 ASSESSMENT — PAIN DESCRIPTION - PAIN TYPE: TYPE: ACUTE PAIN

## 2023-10-18 NOTE — DISCHARGE PLANNING
Case Management Discharge Planning    Admission Date: 10/11/2023  GMLOS:    ALOS: 0    6-Clicks ADL Score: 12  6-Clicks Mobility Score: 7  PT and/or OT Eval ordered: Yes  Post-acute Referrals Ordered: Yes  Post-acute Choice Obtained: Yes  Has referral(s) been sent to post-acute provider:  Yes      Anticipated Discharge Dispo: Discharge Disposition: D/T to facility that provides alf/support care w/planned IP readmit (84) with Home Health Service.  DME Needed: No    Action(s) Taken: Updated Provider/Nurse on Discharge Plan    Pt was discussed in IDT rounds. Dr. Daigle updated on Pt's acceptance to [facility].    0840, TIANA NATH called Srikanth from [facility] to inform negative Quantiferon Gold test and result was faxed to her. Srikanth also confirmed that Pt's bed is ready and all set up. Srikanth also confirmed [facility] is ready. Srikanth was informed Pt has an electric wheelchair at previous [facility] and Srikanth said she will take care of it.    Sheree NEAL, [facility] Coordinator, confirmed Pt can be transported through [facility]. At 0920, forms requested was faxed to her.    0937, RN CM received confirmation from Sheree NEAL For [facility] transport: Scheduled Date: 10/18/2023  Scheduled Time: 1300. Destination: [facility] at [address] [place] [address] Dr Flavio CLARK     0990, Dr. Daigle and TIANA Argueta informed that [facility] is requesting Meds to Bed for 1 month. Dr. Daigle acknowledged through Voalte.    RN CM called the following persons to inform them Pt is transferring to [facility] at [address] [place] [address] Dr Flavio CLARK. They were provided the owners name is Srikanth, phone number , fax number .    Fiduciary  - Therese [phone]- informed through call.    [facility] - [phone] -left  and Susie returned call at 1246. Susie was informed Pt is transferring today at 1300. Susie also provided Pt's sister Mary Alice phone number .    [facility]   -left VM for call back    [facility]- [phone] Cassia informed through call.    1000, Srikanth from [facility] was informed through call that Pt is set to transfer at 1300 today. Srikanth was also informed that the above persons were also informed. Srikanth said she will get in contact [facility] and PAULA Hay through email to inform them of transfer.    1223, TIANA Argueta was given paperworks that [facility] requested which includes the discharge summary note. TIANA Argueta informed to follow up the Meds to bed and she acknowledged.    1255, RN CM met with Pt to inform him of transfer today at 1300 to [facility] and he is agreeable. RN CM also asked Pt if he wants this RN CM to contact her sister Mary Alice of his transfer and he said yes. Call was done to Pt's sister Mary Alice  and was only able to left message for callback.    Escalations Completed: None    Medically Clear: Yes    Next Steps:  RN CM to continue to assist with discharge needs.    Barriers to Discharge: None    Is the patient up for discharge tomorrow: No

## 2023-10-18 NOTE — CARE PLAN
The patient is Stable - Low risk of patient condition declining or worsening    Shift Goals  Clinical Goals: discharge  Patient Goals: discharge'  Family Goals: NEELAM    Progress made toward(s) clinical / shift goals:        Problem: Neuro Status  Goal: Neuro status will remain stable or improve  Outcome: Progressing  Note: Nuero status remains stable, pt orientation stable/ at baseline.      Problem: Urinary Elimination  Goal: Establish and maintain regular urinary output  Outcome: Progressing  Note: Pt using the handheld urinal when necessary, no condom cath needs at this time.     Problem: Bowel Elimination  Goal: Establish and maintain regular bowel function  Outcome: Progressing  Note: Pt is having regular bowel movements      Problem: Fall Risk  Goal: Patient will remain free from falls  Outcome: Progressing  Note: Pt free of falls, fall prevention measures in place and charted.        Patient is not progressing towards the following goals:

## 2023-10-18 NOTE — DISCHARGE SUMMARY
Discharge Summary    CHIEF COMPLAINT ON ADMISSION  Chief Complaint   Patient presents with    Possible Stroke       Reason for Admission  Stroke     Admission Date  10/11/2023    CODE STATUS  Full Code    HPI & HOSPITAL COURSE    68-year-old male with complicated history of diabetes, brain cancer? chronic kidney disease, coronary artery disease, hypertension and prostatic cardiac valve on Eliquis with a AAA who presented 10/11 with possible stroke.  Patient lives in a group home, patient not able to provide good history due to cognitive disorder.  Patient has history of for brain cancer and treated with a craniotomy.  Patient complained of right-sided weakness with slurred speech and worsening confusion.  On admission patient right-sided weakness  some with facial droop.  CT scan of her head did not show any acute finding or bleeding, CTA head and neck did not show any stenosis, EKG showed sinus rhythm with no arrhythmia.  Troponin was mildly elevated at 35.  Neurology evaluated the patient and recommended MRI, patient was not a candidate for tPA.      Patient was continued on his Eliquis and statin.  MRI of the brain did not show any evidence of any acute CVA.  Patient also had evidence of an FARZAD most likely related to his ACE inhibitor and diuretics in the setting of chronic kidney disease.  His ACE inhibitor and diuretics were held during his hospital stay.  We encourage p.o. fluid intake.  Patient did show clinical improvement and was referred to a group home.  We did resume his Bumex but at a reduced dose of 1 mg p.o. daily    Patient to follow with PCP further care and management    Therefore, he is discharged in fair and stable condition to home with close outpatient follow-up.    The patient recovered much more quickly than anticipated on admission.    Discharge Date  10/18    FOLLOW UP ITEMS POST DISCHARGE      DISCHARGE DIAGNOSES  Principal Problem (Resolved):    Stroke-like symptom (POA: Yes)  Active  Problems:    Cardiomyopathy (HCC) (POA: Yes)    CAD (coronary artery disease) (POA: Yes)    AAA (abdominal aortic aneurysm) (HCC) (POA: Yes)    Essential hypertension (POA: Yes)    H/O aortic valve replacement (POA: Yes)    Anemia, chronic disease (POA: Yes)    Stage 4 chronic kidney disease (HCC) (POA: Yes)    Type 2 diabetes mellitus with kidney complication, without long-term current use of insulin (HCC) (POA: Yes)    Cognitive disorder (POA: Yes)    History of CVA (cerebrovascular accident) (POA: Yes)    Dysphagia (POA: Yes)    Pulmonary HTN (HCC) (POA: Yes)  Resolved Problems:    Acute kidney injury superimposed on chronic kidney disease (HCC) (POA: Yes)    Elevated troponin (POA: Yes)      FOLLOW UP  No future appointments.  [facility]  [address], [address]  [facility] [address]  [phone]        [facility] [place]  [address] [address]  Schedule an appointment as soon as possible for a visit on 10/20/2023  [facility] to schedule an appointment with a primary care provider for a hospital follow up. Thank you.    [facility]  [address] [address]  [facility] [address]  [phone]          MEDICATIONS ON DISCHARGE     Medication List        CHANGE how you take these medications        Instructions   atorvastatin 40 MG Tabs  What changed:   medication strength  how much to take  Commonly known as: Lipitor   Take 1 Tablet by mouth every evening.  Dose: 40 mg     bumetanide 1 MG Tabs  What changed:   medication strength  how much to take  when to take this  Commonly known as: Bumex   Take 1 Tablet by mouth every day.  Dose: 1 mg     metoprolol SR 25 MG Tb24  What changed:   medication strength  how much to take  Commonly known as: Toprol XL   Take 1 Tablet by mouth every morning.  Dose: 25 mg            CONTINUE taking these medications        Instructions   acetaminophen 325 MG Tabs  Commonly known as: Tylenol   Take 1 Tablet by mouth 2 times a day.  Dose: 325 mg      apixaban 2.5mg Tabs  Commonly known as: Eliquis   Take 1 Tablet by mouth 2 times a day.  Dose: 2.5 mg     calcitRIOL 0.25 MCG Caps  Commonly known as: Rocaltrol   Take 1 Capsule by mouth every morning.  Dose: 0.25 mcg     ferrous sulfate 325 (65 Fe) MG tablet   Take 1 Tablet by mouth 3 times a week. Monday, tuesday & saturday  Dose: 325 mg     finasteride 5 MG Tabs  Commonly known as: Proscar   Take 1 Tablet by mouth every morning.  Dose: 5 mg     Multivitamin Adult Tabs   Take 1 Tablet by mouth every morning.  Dose: 1 Tablet     tamsulosin 0.4 MG capsule  Commonly known as: Flomax   Take 2 Capsules by mouth every evening.  Dose: 0.8 mg     vitamin D3 1000 Unit (25 mcg) Tabs  Commonly known as: Cholecalciferol   Take 1 Tablet by mouth every morning.  Dose: 1,000 Units            STOP taking these medications      benzonatate 100 MG Caps  Commonly known as: Tessalon     eplerenone 25 MG Tabs  Commonly known as: Inspra     guaiFENesin 100 MG/5ML liquid  Commonly known as: Robitussin     lisinopril 10 MG Tabs  Commonly known as: Prinivil              Allergies  Allergies   Allergen Reactions    Pcn [Penicillins]        DIET  Orders Placed This Encounter   Procedures    Diet Order Diet: Consistent CHO (Diabetic); Tray Modifications (optional): SLP - Deliver to Nursing Station     Standing Status:   Standing     Number of Occurrences:   1     Order Specific Question:   Diet:     Answer:   Consistent CHO (Diabetic) [4]     Order Specific Question:   Tray Modifications (optional)     Answer:   SLP - Deliver to Nursing Station    Discontinue Diet Tray     Standing Status:   Standing     Number of Occurrences:   1       ACTIVITY  As tolerated.  Weight bearing as tolerated    CONSULTATIONS      PROCEDURES  Protocol not completed.      Images     Show images for MR-BRAIN-W/O  MR-BRAIN-W/O  Order: [id number]  Status: Final result     Visible to patient: No (inaccessible in MyChart)     Next appt: None     0 Result  Notes  Details    Reading Physician Reading Date Result Priority   Zari Hernandez M.D.  [phone] 10/13/2023 Disposition Pending     Narrative & Impression     10/12/2023 8:42 PM     HISTORY/REASON FOR EXAM: wth bubble  Right sided weakness     TECHNIQUE/EXAM DESCRIPTION:     T1 sagittal, T2 axial, flair coronal, T1 coronal, and diffusion-weighted axial images were obtained of the brain.     COMPARISON: CT brain, CT perfusion 10/11/2023     FINDINGS:     Age-related volume loss noted with prominent sulci, cisterns and ventricles. Ventricular dilatation is proportionate to the degree of cerebral volume loss. Diffusion weighted images are normal. No acute infarction seen.  Multiple small foci of hemosiderin deposition are noted in the bilateral inferior cerebellum. Motion artifact slightly limits evaluation of the gradient-echo images. Hemosiderin staining of right occipital sulci also noted. A few small scattered foci of   hypointensity are noted in the right posterior frontal/anterior parietal subcortical region.  Most of these lesions were also seen on previous MRI from 2019. Multiple remote cerebellar cortical subsegmental infarcts are present. Encephalomalacia from remote infarction in the central stephon is noted.  Remote right frontal infarct with evidence of malacia noted. Remote bilateral occipital infarcts with encephalomalacia noted. Prior right frontal osteoplastic flap surgery noted.  There are no extra axial collections.  Visualized intracranial arterial flow voids are within normal limits.  Bone marrow signal in the calvarium is within normal limits.  Included portions of the paranasal sinuses are within normal limits.  Included portions of the mastoid air cells are within normal limits.  Included portions of the orbits are within normal limits     IMPRESSION:        No acute intracranial process.     Age-related volume loss and chronic microvascular ischemic changes.     Multiple remote infarcts  involving the right frontal, right parieto-occipital, left occipital, bilateral inferior cerebellar regions. These are stable from the previous MRI from 2019.     Stable foci of hemosiderin deposition in the bilateral inferior cerebellum are likely related to remote hypertensive microhemorrhages.       LABORATORY  Lab Results   Component Value Date    SODIUM 135 10/18/2023    POTASSIUM 4.4 10/18/2023    CHLORIDE 105 10/18/2023    CO2 18 (L) 10/18/2023    GLUCOSE 107 (H) 10/18/2023    BUN 38 (H) 10/18/2023    CREATININE 2.80 (H) 10/18/2023        Lab Results   Component Value Date    WBC 6.6 10/18/2023    HEMOGLOBIN 11.4 (L) 10/18/2023    HEMATOCRIT 34.0 (L) 10/18/2023    PLATELETCT 136 (L) 10/18/2023        Total time of the discharge process exceeds 36 minutes.

## 2023-10-18 NOTE — DISCHARGE PLANNING
DC Transport Scheduled    Received request at: 10/18/2023 at 0920    Transport Company Scheduled:  [employer]  Spoke with Trey at [facility] to schedule transport.    Scheduled Date: 10/18/2023  Scheduled Time: 1300    Destination: [facility] at [address] [place]     Notified care team of scheduled transport via [employer].     If there are any changes needed to the DC transportation scheduled, please contact [facility] at ext. [phone] between the hours of 7429-7560 Mon-Fri. If outside those hours, contact the ED Case Manager at ext. [phone].

## 2023-10-18 NOTE — PROGRESS NOTES
Received report from [facility] nurse. Pt on RA sleeping comfortably, calm, unlabored breathing. Plan of care reviewed. Call light and personal items within reach, bed locked and in lowest position. No further needs at this time.

## 2023-10-18 NOTE — CARE PLAN
The patient is Stable - Low risk of patient condition declining or worsening    Shift Goals  Clinical Goals: placement, maintain skin integrity  Patient Goals: rest and comfort  Family Goals: NEELAM    Progress made toward(s) clinical / shift goals:      No complains of pain overnight, pt is complaint with care follows commands. Turned q2 for comfort and for prevention of pressure injury. No new neuro deficits. Remained free from falls and/or injuries for this shift.       Problem: Knowledge Deficit - Stroke Education  Goal: Patient's knowledge of stroke and risk factors will improve  Outcome: Progressing     Problem: Neuro Status  Goal: Neuro status will remain stable or improve  Outcome: Progressing     Problem: Hemodynamic Monitoring  Goal: Patient's hemodynamics, fluid balance and neurologic status will be stable or improve  Outcome: Progressing     Problem: Respiratory - Stroke Patient  Goal: Patient will achieve/maintain optimum respiratory rate/effort  Outcome: Progressing     Problem: Urinary Elimination  Goal: Establish and maintain regular urinary output  Outcome: Progressing     Problem: Skin Integrity  Goal: Skin integrity is maintained or improved  Outcome: Progressing     Problem: Fall Risk  Goal: Patient will remain free from falls  Outcome: Progressing     Problem: Pain - Standard  Goal: Alleviation of pain or a reduction in pain to the patient’s comfort goal  Outcome: Progressing           Patient is not progressing towards the following goals:

## 2023-10-19 ENCOUNTER — TELEPHONE (OUTPATIENT)
Dept: HEALTH INFORMATION MANAGEMENT | Facility: OTHER | Age: 68
End: 2023-10-19

## 2024-08-03 NOTE — THERAPY
Speech Language Pathology   Cognitive Evaluation     Patient Name: Brannon Silver  AGE:  68 y.o., SEX:  male  Medical Record #: [id number]  Date of Service: 10/12/2023      History of Present Illness  68 y.o. male who presented on 10/11 with right sided weakness, right sided facial droop, and AMS.    PMHx: CVA in 2019, Type II diabetes, CKD stage IV, brain cancer s/p craniotomy with cognitive decline.    No previous SLP service at [facility].     CXR:   1.  Hypoinflation with minimal bibasilar atelectasis  2.  No pneumonia or pneumothorax.  3.  Stable cardiomegaly.    CT-Head:   1.  No evidence of acute territorial infarct, intracranial hemorrhage or mass lesion.  2.  Moderate diffuse cerebral substance loss.  3.  Advanced microangiopathic ischemic change versus demyelination or gliosis.  4.  Areas of encephalomalacia in the right frontal and right occipital lobe which may related to previous infarct or trauma.      General Information  Vitals  O2 Delivery Device: None - Room Air  Level of Consciousness: Alert, Awake  Orientation: Self, General place, Current month, Current year  Follows Directives: Inconsistent (Related to being uncooperative with certain tasks)      Prior Living Situation & Level of Function  Housing / Facility: Group Home  Lives with - Patient's Self Care Capacity: Attendant / Paid Care Giver  Communication: Hx of cognitive decline s/p brain cancer with craniotomy  Swallowing: WFL         Subjective  Pt received awake, alert, upright in bed. Impulsivity and inappropriate comments appreciated throughout session. Pt stated agreement to participate in SLP tasks, however required frequent encouragement to continue participation.        Communication Domain(s)  Expressive Language: WFL  Receptive Language: WFL  Cognitive-Linguistic: Mild  Social/Pragmatic: Moderate      Assessment  The patient was seen this date for a cognitive-linguistic evaluation. Portions of the COGNISTAT (The Neurobehavioral Cognitive  "Status Examination), as well as non-standardized assessments were utilized. Results are as follows:    Cognistat  Orientation: Mild  Attention: Average  Comprehension: Average  Repetition: Average  Naming: Average  Memory: Moderate      Formal testing targeting attention revealed average results, however informal observations suggest impaired attention as evidenced by pt frequently and easily getting distracted by environment requiring moderate cues for redirection. Pt stated, \"I get distracted by sounds.\" Short term memory: pt recalled 3/4 words with category prompts, deficits possibly impacted by pt frequently distracted. Pt demo'd functional working memory through performance on structured tasks he participated in. As previously mentioned, pt demo'd impulsivity and made inappropriate comments to this clinician throughout session. Pt did have instances of feeling apologetic for behavior, stating \"I'm sorry if I'm being rude.\" A complete cognitive evaluation was unable to be completed this date as pt stopped responding to tasks despite encouragement and multiple attempts by this clinician.       Clinical Impressions  Patient presents with mild-moderate cognitive deficits, suspect chronic related to history of brain cancer s/p craniotomy resulting in cognitive decline. Formal and informal assessments revealed impairments in the areas of orientation, attention, and memory. Suspect presentation is consistent with baseline; however pt would benefit from a complete cognitive evaluation targeting the areas of calculations, reasoning, and judgement to guide plan of care.       NOTE: It is not within the scope of practice of Speech-Language Pathologists to determine patient capacity. Please defer to the physician or psych to complete this assessment.       Recommendations  Supervision Needs Upons Discharge: Intermittent supervision throughout the day and direct assistance with IADLs (see below)  IADLs: Medication " "management, Financial management, Appointment management       SLP Treatment Plan  Treatment Plan: Dysphagia Treatment, Cognitive Treatment  SLP Frequency: 2x Per Week  Estimated Duration: Until Therapy Goals Met      Anticipated Discharge Needs  Discharge Recommendations: Recommend home health for continued speech therapy services  Therapy Recommendations Upon DC: Cognitive-Linguistic Training, Patient / Family / Caregiver Education      Patient / Family Goals  Patient / Family Goal #1: \"give me food\"  Short Term Goal # 1: Pt will consume a diet of soft and bite sized solids with thin liquids with no overt s/sx of aspiration or decline in respiratory status  Short Term Goal # 2: Pt will participate in complete cognitive evaluation to assess function of all cognitive domains      Ramy Link, SLP  " Patient seen for nutrition assessment. Limited weight and nutrition history available as patient has difficulty providing. From previous RD assessment (June 2022) patient had reported no food allergies, was with good appetite/PO intake, and his weight at that time was documented at 212 pounds. Noted this admission patient's documented weight is 173 pounds, indicating significant weight loss. Unclear time line. When patient inquired about weight loss, he reports it is because he was not getting foods he likes (so suspect related to inadequate PO intake). Noted swallow evaluation (8/2) with recommendations for easy to chew with mildly thick liquids. Patient offers no food preferences at this time, diet education not appropriate at this time as patient appears confused.

## 2025-01-15 NOTE — THERAPY
"Physical Therapy Treatment completed.   Bed Mobility:  Supine to Sit: Supervised  Transfers: Sit to Stand: Minimal Assist  Gait: Level Of Assist: Minimal Assist with Front-Wheel Walker       Plan of Care: Will benefit from Physical Therapy 5 times per week  Discharge Recommendations: Equipment: Will Continue to Assess for Equipment Needs. Post-acute therapy Recommend post-acute placement for continued physical therapy services prior to discharge home. Patient can tolerate post-acute therapies at a 5x/week frequency.       See \"Rehab Therapy-Acute\" Patient Summary Report for complete documentation.     Pt progressing slowly w/ therapy. Pt requires a lot of time to perform all mobility. He is easily distracted and is unable to dual task. Pt ambulated 200 feet w/ the FWW and it took him almost 15 minutes to perform. Pt had multiple instances w/ LOB but he was able to self correct. Pt has poor safety awareness and insight into deficits. Pt continually stating \"I don't feel comfortable w/ your hand on me while we walk\" but didn't understand reasoning why. Pt is a very high risk for falls. He continues to be at a level in which he will benefit from post acute therapy.  " .